# Patient Record
Sex: MALE | Race: WHITE | NOT HISPANIC OR LATINO | Employment: FULL TIME | URBAN - NONMETROPOLITAN AREA
[De-identification: names, ages, dates, MRNs, and addresses within clinical notes are randomized per-mention and may not be internally consistent; named-entity substitution may affect disease eponyms.]

---

## 2020-02-10 ENCOUNTER — HOSPITAL ENCOUNTER (EMERGENCY)
Facility: HOSPITAL | Age: 60
Discharge: HOME | End: 2020-02-10
Attending: EMERGENCY MEDICINE | Admitting: EMERGENCY MEDICINE
Payer: COMMERCIAL

## 2020-02-10 ENCOUNTER — APPOINTMENT (EMERGENCY)
Dept: RADIOLOGY | Facility: HOSPITAL | Age: 60
End: 2020-02-10
Payer: COMMERCIAL

## 2020-02-10 VITALS
BODY MASS INDEX: 28.14 KG/M2 | TEMPERATURE: 97.2 F | OXYGEN SATURATION: 95 % | HEART RATE: 82 BPM | HEIGHT: 69 IN | DIASTOLIC BLOOD PRESSURE: 95 MMHG | SYSTOLIC BLOOD PRESSURE: 152 MMHG | RESPIRATION RATE: 18 BRPM | WEIGHT: 190 LBS

## 2020-02-10 DIAGNOSIS — S43.101A AC SEPARATION, RIGHT, INITIAL ENCOUNTER: ICD-10-CM

## 2020-02-10 DIAGNOSIS — S49.91XA INJURY OF RIGHT SHOULDER, INITIAL ENCOUNTER: Primary | ICD-10-CM

## 2020-02-10 PROCEDURE — 73000 X-RAY EXAM OF COLLAR BONE: CPT

## 2020-02-10 PROCEDURE — 99284 EMERGENCY DEPT VISIT MOD MDM: CPT | Performed by: PHYSICIAN ASSISTANT

## 2020-02-10 PROCEDURE — 73030 X-RAY EXAM OF SHOULDER: CPT

## 2020-02-10 PROCEDURE — 99283 EMERGENCY DEPT VISIT LOW MDM: CPT

## 2020-02-10 RX ORDER — NAPROXEN 500 MG/1
500 TABLET ORAL 2 TIMES DAILY WITH MEALS
Qty: 30 TABLET | Refills: 0 | Status: SHIPPED | OUTPATIENT
Start: 2020-02-10

## 2020-02-10 RX ORDER — TRAMADOL HYDROCHLORIDE 50 MG/1
50 TABLET ORAL EVERY 8 HOURS PRN
Qty: 8 TABLET | Refills: 0 | Status: SHIPPED | OUTPATIENT
Start: 2020-02-10

## 2020-02-10 NOTE — ED PROVIDER NOTES
History  Chief Complaint   Patient presents with    Shoulder Injury     61year old male presents for evaluation of right shoulder injury  This occurred Saturday night  Pt notes he was walking through mud and tripped and fell  He started to fall backwards and leaned to the right side and put his arm out to catch his fall  Since that time has had pain in his right shoulder  Pain located in right shoulder and radiates along clavicle  He has been able to move his arm but reports limited ROM above shoulder level and increased pain with movement  Denies numbness, tingling, weakness  Denies hitting head  Denies LOC  Denies aspirin or blood thinners  Denies neck or back pain  No other injuries reported  Has been taking tylenol with mild relief  Reports prior left shoulder injury from a skiing accident but denies prior problems on the right  Pt right hand dominant  Denies pain in elbow, wrist or hands  PMH unremarkable  No meds reported  History provided by:  Patient and spouse   used: No    Shoulder Injury   Location:  Shoulder  Shoulder location:  R shoulder  Injury: yes    Time since incident:  2 days  Mechanism of injury: fall    Fall:     Fall occurred:  Walking    Impact surface: mud  Pain details:     Quality:  Aching    Radiates to:  R shoulder  Handedness:  Right-handed  Prior injury to area:  No  Worsened by: Movement  Ineffective treatments:  Acetaminophen  Associated symptoms: decreased range of motion and swelling    Associated symptoms: no back pain, no fatigue, no fever, no muscle weakness, no neck pain, no numbness, no stiffness and no tingling    Risk factors: no concern for non-accidental trauma, no known bone disorder, no frequent fractures and no recent illness        None       History reviewed  No pertinent past medical history  History reviewed  No pertinent surgical history  History reviewed  No pertinent family history    I have reviewed and agree with the history as documented  Social History     Tobacco Use    Smoking status: Never Smoker    Smokeless tobacco: Never Used   Substance Use Topics    Alcohol use: Not Currently     Frequency: Never    Drug use: Never        Review of Systems   Constitutional: Negative  Negative for chills, fatigue and fever  HENT: Negative  Negative for congestion, rhinorrhea and sore throat  Eyes: Negative  Negative for visual disturbance  Respiratory: Negative  Negative for cough, shortness of breath and wheezing  Cardiovascular: Negative  Negative for chest pain, palpitations and leg swelling  Gastrointestinal: Negative  Negative for abdominal pain, constipation, diarrhea, nausea and vomiting  Genitourinary: Negative  Negative for dysuria, flank pain, frequency and hematuria  Musculoskeletal: Positive for arthralgias  Negative for back pain, myalgias, neck pain and stiffness  Skin: Negative  Negative for rash and wound  Neurological: Negative  Negative for dizziness, syncope, light-headedness, numbness and headaches  Psychiatric/Behavioral: Negative  Negative for confusion  All other systems reviewed and are negative  Physical Exam  Physical Exam   Constitutional: He is oriented to person, place, and time  He appears well-developed and well-nourished  Non-toxic appearance  No distress  HENT:   Head: Normocephalic and atraumatic  Right Ear: Hearing, tympanic membrane, external ear and ear canal normal    Left Ear: Hearing, tympanic membrane, external ear and ear canal normal    Nose: Nose normal    Mouth/Throat: Uvula is midline, oropharynx is clear and moist and mucous membranes are normal  No oropharyngeal exudate  Eyes: Pupils are equal, round, and reactive to light  Conjunctivae and EOM are normal  No scleral icterus  Neck: Trachea normal and normal range of motion  Neck supple  No JVD present  No tracheal deviation present     Cardiovascular: Normal rate, regular rhythm, normal heart sounds, intact distal pulses and normal pulses  No murmur heard  Pulmonary/Chest: Effort normal and breath sounds normal  No respiratory distress  He has no wheezes  He has no rhonchi  He has no rales  Abdominal: Soft  Normal appearance and bowel sounds are normal  There is no hepatosplenomegaly  There is no tenderness  There is no rebound, no guarding and no CVA tenderness  No hernia  Musculoskeletal:        Right shoulder: He exhibits decreased range of motion (limits lifting above shoulder level), tenderness, swelling, deformity (there is elevation the AC joint) and pain  He exhibits no bony tenderness (no focal bony tenderness elcited), no effusion, no crepitus, no laceration, no spasm, normal pulse and normal strength  Right elbow: Normal He exhibits normal range of motion  No tenderness found  Right wrist: Normal  He exhibits normal range of motion and no bony tenderness  Cervical back: Normal  He exhibits normal range of motion and no bony tenderness  Thoracic back: Normal  He exhibits normal range of motion and no bony tenderness  Lumbar back: Normal  He exhibits normal range of motion and no bony tenderness  Neurological: He is alert and oriented to person, place, and time  He has normal strength and normal reflexes  No cranial nerve deficit or sensory deficit  He exhibits normal muscle tone  Coordination and gait normal  GCS eye subscore is 4  GCS verbal subscore is 5  GCS motor subscore is 6  Ambulates without assistance   Skin: Skin is warm and dry  Capillary refill takes less than 2 seconds  No rash noted  He is not diaphoretic  No cyanosis  Nails show no clubbing  Psychiatric: He has a normal mood and affect  His behavior is normal    Nursing note and vitals reviewed        Vital Signs  ED Triage Vitals [02/10/20 1005]   Temperature Pulse Respirations Blood Pressure SpO2   (!) 97 2 °F (36 2 °C) 82 18 (!) 169/101 98 %      Temp Source Heart Rate Source Patient Position - Orthostatic VS BP Location FiO2 (%)   Temporal Monitor Sitting Left arm --      Pain Score       9           Vitals:    02/10/20 1030 02/10/20 1046 02/10/20 1100 02/10/20 1204   BP: 148/95 148/95 152/95    Pulse:   81 82   Patient Position - Orthostatic VS: Sitting  Sitting Sitting         Visual Acuity      ED Medications  Medications - No data to display    Diagnostic Studies  Results Reviewed     None                 XR clavicle RIGHT   Final Result by Bethany Martin DO (02/10 1106)      Unremarkable right clavicle  Possible inferior subluxation of the acromion in relation to the distal clavicle  See separate shoulder x-ray report  Workstation performed: [id number]         XR shoulder 2+ views RIGHT   ED Interpretation by Bebo Barber PA-C (02/10 1038)   Abnormal   AC separation      Final Result by Bethany Martin DO (02/10 1105)      No acute osseous abnormality  There may be slight inferior subluxation of the acromion in relation to the clavicular head when compared to opposite left shoulder imaging from 2009  Consider AC joint imaging with weights  Workstation performed: [id number]                    Procedures  Procedures         ED Course  ED Course as of Feb 10 1403   Mon Feb 10, 2020   1038 XR shoulder 2+ views RIGHT(!)   1038 XR clavicle RIGHT   1124 Findings discussed with pt and spouse  Will place in sling and refer to orthopedics  Sling as directed  Referral to orthopedics  Reviewed RICE therapy  Rx NSAID as directed with food  Brief course of pain Rx provided  PDMP was queried and no suspicious behaviors noted  Ultram, qty#8 provided  Risks/benefits/side effects discussed  Sedation precautions reviewed  No driving while taking  Note for work given  Advised outpatient follow up with orthopedics or return to ER for change in condition as outlined   Pt and spouse verbalized understanding and had no further questions  MDM  Number of Diagnoses or Management Options  AC separation, right, initial encounter: new and requires workup  Injury of right shoulder, initial encounter: new and requires workup     Amount and/or Complexity of Data Reviewed  Tests in the radiology section of CPT®: ordered and reviewed  Decide to obtain previous medical records or to obtain history from someone other than the patient: yes  Obtain history from someone other than the patient: yes  Review and summarize past medical records: yes  Independent visualization of images, tracings, or specimens: yes    Patient Progress  Patient progress: improved        Disposition  Final diagnoses:   Injury of right shoulder, initial encounter   AC separation, right, initial encounter     Time reflects when diagnosis was documented in both MDM as applicable and the Disposition within this note     Time User Action Codes Description Comment    2/10/2020 11:24 AM Panda Roque Add [S49 91XA] Injury of right shoulder, initial encounter     2/10/2020 11:24 AM Panda Roque Add [S43 101A] AC separation, right, initial encounter       ED Disposition     ED Disposition Condition Date/Time Comment    Discharge Stable Mon Feb 10, 2020 11:24 AM Keo Kim  discharge to home/self care              Follow-up Information     Follow up With Specialties Details Why Contact Info Additional [phone] [facility] Schedule an appointment as soon as possible for a visit   [address] [facility] [address]  [address] [place] [address] Specialists Tawnya Hair [address] [facility], [facility] [place], [address]    [facility] Emergency Department Emergency Medicine  As needed [place]  [id number] [phone] [place] ED, [address], [address]      Schedule an appointment as soon as possible for a visit today             Discharge Medication List as of 2/10/2020 11:27 AM      START taking these medications    Details   naproxen (NAPROSYN) 500 mg tablet Take 1 tablet (500 mg total) by mouth 2 (two) times a day with meals, Starting Mon 2/10/2020, Normal      traMADol (ULTRAM) 50 mg tablet Take 1 tablet (50 mg total) by mouth every 8 (eight) hours as needed for severe pain, Starting Mon 2/10/2020, Normal           No discharge procedures on file      ED Provider  Electronically Signed by           Sridhar Becerril PA-C  02/10/20 5140

## 2020-02-10 NOTE — ED NOTES
Patient transported to [address] [place] [address], [address] [place] [address]  02/10/20 1010

## 2020-02-10 NOTE — CASE MANAGEMENT
I self referred the patient to discuss resources that might be available to him  He denied needing any help at this time  The patient lives with his wife in a one story house  There is no YELENA and one flight of 12 steps to the basement  He has no DME  He does not receive meals on wheels or home health services at this time  He is independent with his ADL's and he drives  He uses [address] in [place]  He has no trouble getting or paying for his medications  He has [employer] insurance  He did not call his PCP prior to coming to the ED today

## 2020-02-10 NOTE — DISCHARGE INSTRUCTIONS
Rest, ice, compression, elevation  Use sling as directed  Take anti-inflammatory as directed with food  Can supplement with OTC tylenol  Caution sedation with pain medication  No driving while taking  Schedule follow up with orthopedics for further evaluation and management

## 2020-02-12 VITALS
HEART RATE: 71 BPM | SYSTOLIC BLOOD PRESSURE: 144 MMHG | WEIGHT: 190 LBS | HEIGHT: 69 IN | RESPIRATION RATE: 16 BRPM | DIASTOLIC BLOOD PRESSURE: 83 MMHG | BODY MASS INDEX: 28.14 KG/M2

## 2020-02-12 DIAGNOSIS — S43.101A AC SEPARATION, RIGHT, INITIAL ENCOUNTER: Primary | ICD-10-CM

## 2020-02-12 PROCEDURE — 99203 OFFICE O/P NEW LOW 30 MIN: CPT | Performed by: ORTHOPAEDIC SURGERY

## 2020-02-12 NOTE — LETTER
February 12, 2020     Patient: Mariana Mart  YOB: 1960   Date of Visit: 2/12/2020       To Whom It May Concern: It is my medical opinion that Abby Mahajan should remain out of work until Monday 02/17/2020 at which time he may return but is to avoid any lifting more than 10 lb with the right shoulder and is not to do any pushing or pulling with the right arm  If you have any questions or concerns, please don't hesitate to call           Sincerely,        Michelle Vergara PA-C  CC: No Recipients

## 2020-02-12 NOTE — PATIENT INSTRUCTIONS
Reviewed x-ray findings and pathophysiology and treatment options with the patient at length  He will be in a sling for comfort only and when out in public for the next 2 weeks  He may remove the sling at home and work on gentle range of motion of the shoulder as pain permits  He may use anti-inflammatories and/or tramadol along with Tylenol and ice for 20 minutes on an as-needed basis  He was shown finger wall walks and and shoulder circles  We will re-evaluate the patient in 2 weeks and discuss possible physical therapy at that point time  Patient will return back to work on Monday 02/17/2019 and be out of work from injury date of 02/10 until that point time

## 2020-02-12 NOTE — PROGRESS NOTES
61 y o male presents for ER follow-up of right shoulder injury  Patient is right-hand dominant  Reports he was walking at home slipped and fell backward landing on the shoulder  He did not land on an outstretched hand  He noted pain about the top of the shoulder went emergency room  X-rays taken noting AC separation  He is placed in a sling is here for follow-up  Given prescription for Naprosyn and tramadol  He has been using those  He notes some decreasing amount of pain about the shoulder  He denies any numbness or tingling  Denies any other injury to the upper extremity head or neck region  Review of Systems  Review of systems negative unless otherwise specified in HPI    Past Medical History  Past Medical History:   Diagnosis Date    Known health problems: none      Past Surgical History  Past Surgical History:   Procedure Laterality Date    SHOULDER SURGERY       Current Medications  Current Outpatient Medications on File Prior to Visit   Medication Sig Dispense Refill    naproxen (NAPROSYN) 500 mg tablet Take 1 tablet (500 mg total) by mouth 2 (two) times a day with meals 30 tablet 0    traMADol (ULTRAM) 50 mg tablet Take 1 tablet (50 mg total) by mouth every 8 (eight) hours as needed for severe pain 8 tablet 0     No current facility-administered medications on file prior to visit  Recent Labs (HCT,HGB,PT,INR,ESR,CRP,GLU,HgA1C)  No results found for: HCT, HGB, WBC, PT, INR, ESR, CRP, GLUCOSE, HGBA1C    Physical exam  · General: Awake, Alert, Oriented  · Eyes: Pupils equal, round and reactive to light  · Heart: regular rate and rhythm  · Lungs: No audible wheezing  · Abdomen: soft  right Shoulder  · Obvious protuberance at the [facility] joint of the right shoulder which is not present on the left  There is pain with palpation at the [facility] joint  Clavicle is able to be mobilized the protuberance disappears  There is no neck pain to palpation    He has full range of motion of the elbow wrist and hand   He has full sensation and equal  strength  Forward flexion is approximately 90° abduction 90°  No pain at the [facility] joint no pain along the clavicle to palpation  Procedure  None    Imaging  I reviewed the x-rays which note AC separation and his back clinically by exam     1  AC separation, right, initial encounter      Assessment:  Isolated right shoulder AC separation without fracture  Plan:  Reviewed x-ray findings and pathophysiology and treatment options with the patient at length  He will be in a sling for comfort only and when out in public for the next 2 weeks  He may remove the sling at home and work on gentle range of motion of the shoulder as pain permits  He may use anti-inflammatories and/or tramadol along with Tylenol and ice for 20 minutes on an as-needed basis  He was shown finger wall walks and and shoulder circles  We will re-evaluate the patient in 2 weeks and discuss possible physical therapy at that point time  Patient will return back to work on Monday 02/17/2019 and be out of work from injury date of 02/10 until that point time

## 2020-02-26 ENCOUNTER — APPOINTMENT (OUTPATIENT)
Dept: RADIOLOGY | Facility: MEDICAL CENTER | Age: 60
End: 2020-02-26
Payer: COMMERCIAL

## 2020-02-26 VITALS
WEIGHT: 199.7 LBS | BODY MASS INDEX: 29.58 KG/M2 | DIASTOLIC BLOOD PRESSURE: 81 MMHG | SYSTOLIC BLOOD PRESSURE: 137 MMHG | HEIGHT: 69 IN | HEART RATE: 60 BPM

## 2020-02-26 DIAGNOSIS — S43.101D CLOSED DISLOCATION OF RIGHT ACROMIOCLAVICULAR JOINT, SUBSEQUENT ENCOUNTER: Primary | ICD-10-CM

## 2020-02-26 DIAGNOSIS — S43.101D CLOSED DISLOCATION OF RIGHT ACROMIOCLAVICULAR JOINT, SUBSEQUENT ENCOUNTER: ICD-10-CM

## 2020-02-26 PROCEDURE — 73030 X-RAY EXAM OF SHOULDER: CPT

## 2020-02-26 PROCEDURE — 99213 OFFICE O/P EST LOW 20 MIN: CPT | Performed by: ORTHOPAEDIC SURGERY

## 2020-02-26 PROCEDURE — 73050 X-RAY EXAM OF SHOULDERS: CPT

## 2020-02-26 NOTE — PROGRESS NOTES
Chief Complaint   right shoulder pain    History Of Presenting Illness  Rena Handley  1960 presents with  Right shoulder pain due to traumatic right AC joint dislocation  Patient comes in today for evaluation and x-rays  Pain is decreased significantly  Patient is right handed  and is able to work without any discomfort  His only complaint is swelling on top of the right shoulder      Current Medications  Current Outpatient Medications   Medication Sig Dispense Refill    naproxen (NAPROSYN) 500 mg tablet Take 1 tablet (500 mg total) by mouth 2 (two) times a day with meals 30 tablet 0    traMADol (ULTRAM) 50 mg tablet Take 1 tablet (50 mg total) by mouth every 8 (eight) hours as needed for severe pain (Patient not taking: Reported on 2/26/2020) 8 tablet 0     No current facility-administered medications for this visit  Current Problems    Active Problems: There are no active problems to display for this patient          Review of Systems:    General: negative for - chills, fatigue, fever,  weight gain or weight loss  Psychological: negative for - anxiety, behavioral disorder, concentration difficulties  Ophthalmic: negative for - blurry vision, decreased vision, double vision,      Past Medical History:   Past Medical History:   Diagnosis Date    Known health problems: none        Past Surgical History:   Past Surgical History:   Procedure Laterality Date    SHOULDER SURGERY         Family History:  Family history reviewed and non-contributory  Family History   Problem Relation Age of Onset    Breast cancer Mother     Heart disease Father     No Known Problems Sister     No Known Problems Brother        Social History:  Social History     Socioeconomic History    Marital status: /Civil Union     Spouse name: None    Number of children: None    Years of education: None    Highest education level: None   Occupational History    None   Social Needs    Financial resource strain: None    Food insecurity:     Worry: None     Inability: None    Transportation needs:     Medical: None     Non-medical: None   Tobacco Use    Smoking status: Never Smoker    Smokeless tobacco: Never Used   Substance and Sexual Activity    Alcohol use: Yes     Frequency: Never     Comment: social    Drug use: Never    Sexual activity: None   Lifestyle    Physical activity:     Days per week: None     Minutes per session: None    Stress: None   Relationships    Social connections:     Talks on phone: None     Gets together: None     Attends Yazidi service: None     Active member of club or organization: None     Attends meetings of clubs or organizations: None     Relationship status: None    Intimate partner violence:     Fear of current or ex partner: None     Emotionally abused: None     Physically abused: None     Forced sexual activity: None   Other Topics Concern    None   Social History Narrative    None       Allergies:   No Known Allergies        Physical ExaminationBP 137/81 (BP Location: Left arm, Patient Position: Sitting, Cuff Size: Large)   Pulse 60   Ht 5' 9" (1 753 m)   Wt 90 6 kg (199 lb 11 2 oz)   BMI 29 49 kg/m²   Gen: Alert and oriented to person, place, time  HEENT: EOMI, eyes clear, moist mucus membranes, hearing intact      Orthopedic Exam   right shoulder examination prominent right distal clavicle with the reducible subluxation   no tenderness over the [facility] joint excellent range of motion in the right shoulder without any deficits   radiographs  confirm complete dislocation of the right [facility] joint with superior migration          Impression   right AC joint dislocation          Plan     discussed treatment with the patient   options are to leave this alone and live with this with the persistent cosmetic deformity   the other option is excision of the distal clavicle and reconstruction of the coracoclavicular ligaments with allograft   patient does not want anything done at this moment but will see us back if he decides to get something done and changes his mind    Khadijah Hinojosa MD        Portions of the record may have been created with voice recognition software  Occasional wrong word or "sound a like" substitutions may have occurred due to the inherent limitations of voice recognition software  Read the chart carefully and recognize, using context, where substitutions have occurred

## 2022-09-22 ENCOUNTER — OFFICE VISIT (OUTPATIENT)
Dept: CARDIOLOGY CLINIC | Facility: CLINIC | Age: 62
End: 2022-09-22
Payer: COMMERCIAL

## 2022-09-22 VITALS
SYSTOLIC BLOOD PRESSURE: 140 MMHG | WEIGHT: 189 LBS | HEIGHT: 69 IN | DIASTOLIC BLOOD PRESSURE: 80 MMHG | HEART RATE: 72 BPM | BODY MASS INDEX: 27.99 KG/M2

## 2022-09-22 DIAGNOSIS — Z82.49 FH: CAD (CORONARY ARTERY DISEASE): ICD-10-CM

## 2022-09-22 DIAGNOSIS — Z82.49 FAMILY HISTORY OF EARLY CAD: Primary | ICD-10-CM

## 2022-09-22 DIAGNOSIS — R06.09 DYSPNEA ON EXERTION: ICD-10-CM

## 2022-09-22 PROCEDURE — 93000 ELECTROCARDIOGRAM COMPLETE: CPT | Performed by: INTERNAL MEDICINE

## 2022-09-22 PROCEDURE — 99204 OFFICE O/P NEW MOD 45 MIN: CPT | Performed by: INTERNAL MEDICINE

## 2022-09-22 NOTE — PATIENT INSTRUCTIONS
Cholesterol and Your Health   AMBULATORY CARE:   Cholesterol  is a waxy, fat-like substance  Your body uses cholesterol to make hormones and new cells, and to protect nerves  Cholesterol is made by your body  It also comes from certain foods you eat, such as meat and dairy products  Your healthcare provider can help you set goals for your cholesterol levels  He or she can help you create a plan to meet your goals  Cholesterol level goals: Your cholesterol level goals depend on your risk for heart disease, your age, and your other health conditions  The following are general guidelines: Total cholesterol  includes low-density lipoprotein (LDL), high-density lipoprotein (HDL), and triglyceride levels  The total cholesterol level should be lower than 200 mg/dL and is best at about 150 mg/dL  LDL cholesterol  is called bad cholesterol  because it forms plaque in your arteries  As plaque builds up, your arteries become narrow, and less blood flows through  When plaque decreases blood flow to your heart, you may have chest pain  If plaque completely blocks an artery that brings blood to your heart, you may have a heart attack  Plaque can break off and form blood clots  Blood clots may block arteries in your brain and cause a stroke  The level should be less than 130 mg/dL and is best at about 100 mg/dL  HDL cholesterol  is called good cholesterol  because it helps remove LDL cholesterol from your arteries  It does this by attaching to LDL cholesterol and carrying it to your liver  Your liver breaks down LDL cholesterol so your body can get rid of it  High levels of HDL cholesterol can help prevent a heart attack and stroke  Low levels of HDL cholesterol can increase your risk for heart disease, heart attack, and stroke  The level should be 60 mg/dL or higher  Triglycerides  are a type of fat that store energy from foods you eat  High levels of triglycerides also cause plaque buildup   This can increase your risk for a heart attack or stroke  If your triglyceride level is high, your LDL cholesterol level may also be high  The level should be less than 150 mg/dL  Any of the following can increase your risk for high cholesterol:   Smoking cigarettes    Being overweight or obese, or not getting enough exercise    Drinking large amounts of alcohol    A medical condition such as hypertension (high blood pressure) or diabetes    Certain genes passed from your parents to you    Age older than 65 years    What you need to know about having your cholesterol levels checked: Adults 21to 39years of age should have their cholesterol levels checked every 4 to 6 years  Adults 45 years or older should have their cholesterol checked every 1 to 2 years  You may need your cholesterol checked more often, or at a younger age, if you have risk factors for heart disease  You may also need to have your cholesterol checked more often if you have other health conditions, such as diabetes  Blood tests are used to check cholesterol levels  Blood tests measure your levels of triglycerides, LDL cholesterol, and HDL cholesterol  How healthy fats affect your cholesterol levels:  Healthy fats, also called unsaturated fats, help lower LDL cholesterol and triglyceride levels  Healthy fats include the following:  Monounsaturated fats  are found in foods such as olive oil, canola oil, avocado, nuts, and olives  Polyunsaturated fats,  such as omega 3 fats, are found in fish, such as salmon, trout, and tuna  They can also be found in plant foods such as flaxseed, walnuts, and soybeans  How unhealthy fats affect your cholesterol levels:  Unhealthy fats increase LDL cholesterol and triglyceride levels  They are found in foods high in cholesterol, saturated fat, and trans fat:  Cholesterol  is found in eggs, dairy, and meat  Saturated fat  is found in butter, cheese, ice cream, whole milk, and coconut oil   Saturated fat is also found in meat, such as sausage, hot dogs, and bologna  Trans fat  is found in liquid oils and is used in fried and baked foods  Foods that contain trans fats include chips, crackers, muffins, sweet rolls, microwave popcorn, and cookies  Treatment  for high cholesterol will also decrease your risk of heart disease, heart attack, and stroke  Treatment may include any of the following:  Lifestyle changes  may include food, exercise, weight loss, and quitting smoking  You may also need to decrease the amount of alcohol you drink  Your healthcare provider will want you to start with lifestyle changes  Other treatment may be added if lifestyle changes are not enough  Your healthcare provider may recommend you work with a team to manage hyperlipidemia  The team may include medical experts such as a dietitian, an exercise or physical therapist, and a behavior therapist  Your family members may be included in helping you create lifestyle changes  Medicines  may be given to lower your LDL cholesterol, triglyceride levels, or total cholesterol level  You may need medicines to lower your cholesterol if any of the following is true:    You have a history of stroke, TIA, unstable angina, or a heart attack  Your LDL cholesterol level is 190 mg/dL or higher  You are age 36 to 76 years, have diabetes or heart disease risk factors, and your LDL cholesterol is 70 mg/dL or higher  Supplements  include fish oil, red yeast rice, and garlic  Fish oil may help lower your triglyceride and LDL cholesterol levels  It may also increase your HDL cholesterol level  Red yeast rice may help decrease your total cholesterol level and LDL cholesterol level  Garlic may help lower your total cholesterol level  Do not take any supplements without talking to your healthcare provider  Food changes you can make to lower your cholesterol levels:  A dietitian can help you create a healthy eating plan   He or she can show you how to read food labels and choose foods low in saturated fat, trans fats, and cholesterol  Decrease the total amount of fat you eat  Choose lean meats, fat-free or 1% fat milk, and low-fat dairy products, such as yogurt and cheese  Try to limit or avoid red meats  Limit or do not eat fried foods or baked goods, such as cookies  Replace unhealthy fats with healthy fats  Cook foods in olive oil or canola oil  Choose soft margarines that are low in saturated fat and trans fat  Seeds, nuts, and avocados are other examples of healthy fats  Eat foods with omega-3 fats  Examples include salmon, tuna, mackerel, walnuts, and flaxseed  Eat fish 2 times per week  Pregnant women should not eat fish that have high levels of mercury, such as shark, swordfish, and yessenia mackerel  Increase the amount of high-fiber foods you eat  High-fiber foods can help lower your LDL cholesterol  Aim to get between 20 and 30 grams of fiber each day  Fruits and vegetables are high in fiber  Eat at least 5 servings each day  Other high-fiber foods are whole-grain or whole-wheat breads, pastas, or cereals, and brown rice  Eat 3 ounces of whole-grain foods each day  Increase fiber slowly  You may have abdominal discomfort, bloating, and gas if you add fiber to your diet too quickly  Eat healthy protein foods  Examples include low-fat dairy products, skinless chicken and turkey, fish, and nuts  Limit foods and drinks that are high in sugar  Your dietitian or healthcare provider can help you create daily limits for high-sugar foods and drinks  The limit may be lower if you have diabetes or another health condition  Limits can also help you lose weight if needed  Lifestyle changes you can make to lower your cholesterol levels:   Maintain a healthy weight  Ask your healthcare provider what a healthy weight is for you  Ask him or her to help you create a weight loss plan if needed   Weight loss can decrease your total cholesterol and triglyceride levels  Weight loss may also help keep your blood pressure at a healthy level  Be physically active throughout the day  Physical activity, such as exercise, can help lower your total cholesterol level and maintain a healthy weight  Physical activity can also help increase your HDL cholesterol level  Work with your healthcare provider to create an program that is right for you  Get at least 30 to 40 minutes of moderate physical activity most days of the week  Examples of exercise include brisk walking, swimming, or biking  Also include strength training at least 2 times each week  Your healthcare providers can help you create a physical activity plan  Do not smoke  Nicotine and other chemicals in cigarettes and cigars can raise your cholesterol levels  Ask your healthcare provider for information if you currently smoke and need help to quit  E-cigarettes or smokeless tobacco still contain nicotine  Talk to your healthcare provider before you use these products  Limit or do not drink alcohol  Alcohol can increase your triglyceride levels  Ask your healthcare provider before you drink alcohol  Ask how much is okay for you to drink in 24 hours or 1 week  Follow up with your doctor as directed:  Write down your questions so you remember to ask them during your visits  © Copyright [employer] 2022 Information is for End User's use only and may not be sold, redistributed or otherwise used for commercial purposes  All illustrations and images included in CareNotes® are the copyrighted property of [employer]  or [facility] [place] [facility]  The above information is an  only  It is not intended as medical advice for individual conditions or treatments  Talk to your doctor, nurse or pharmacist before following any medical regimen to see if it is safe and effective for you

## 2022-09-22 NOTE — PROGRESS NOTES
Subjective:        Patient ID: Adeel Naranjo  is a 58 y o  male  Chief Complaint:  Carmina Mar is here for cardiac opinion on his own accord, he no longer has a PCP, used to see 1 before the pandemic but has long been lost to follow-up he says  Denies any history rheumatic fever, heart disease, heart murmur  Family history of CAD and dysrhythmia which is why he wanted to get checked out  Has not been exercising lately but stays active, rarely achieving over 4 Mets lately but he used to, used to be a runner with no issues for many years  Denies any chest pains, no more than anticipated dyspnea on exertion when he goes up really steep hills or something like that, nothing he says he would not expect, no orthopnea PND, no palpitations presyncope or syncope, no edema orthopnea nor TIA symptoms  No claudication symptoms  He does not use any illicit substances, denies any excess stimulant use, does not excessively use alcohol, does not smoke or use any tobacco products  The following portions of the patient's history were reviewed and updated as appropriate: allergies, current medications, past family history, past medical history, past social history, past surgical history and problem list   Review of Systems   Constitutional: Negative for chills, diaphoresis, malaise/fatigue and weight gain  HENT: Negative for nosebleeds and stridor  Eyes: Negative for double vision, vision loss in left eye, vision loss in right eye and visual disturbance  Cardiovascular: Negative for chest pain, claudication, cyanosis, dyspnea on exertion, irregular heartbeat, leg swelling, near-syncope, orthopnea, palpitations, paroxysmal nocturnal dyspnea and syncope  Respiratory: Negative for cough, shortness of breath, snoring and wheezing  Endocrine: Negative for polydipsia, polyphagia and polyuria  Hematologic/Lymphatic: Negative for bleeding problem  Does not bruise/bleed easily     Skin: Negative for flushing and rash  Musculoskeletal: Negative for falls and myalgias  Gastrointestinal: Negative for abdominal pain, heartburn, hematemesis, hematochezia, melena and nausea  Genitourinary: Negative for hematuria  Neurological: Negative for brief paralysis, dizziness, focal weakness, headaches, light-headedness, loss of balance and vertigo  Psychiatric/Behavioral: Negative for altered mental status and substance abuse  Allergic/Immunologic: Negative for hives  Objective:      /80   Pulse 72   Ht 5' 9" (1 753 m)   Wt 85 7 kg (189 lb)   BMI 27 91 kg/m²   Physical Exam  Constitutional:       General: He is not in acute distress  Appearance: He is well-developed  He is not diaphoretic  HENT:      Head: Normocephalic and atraumatic  Eyes:      General: No scleral icterus  Pupils: Pupils are equal, round, and reactive to light  Neck:      Thyroid: No thyromegaly  Vascular: No carotid bruit or JVD  Cardiovascular:      Rate and Rhythm: Normal rate and regular rhythm  Heart sounds: Murmur (Grade 1 systolic outflow murmur with normal S1-S2) heard  No friction rub  No gallop  Pulmonary:      Effort: Pulmonary effort is normal  No respiratory distress  Breath sounds: Normal breath sounds  No stridor  No wheezing or rales  Abdominal:      General: Bowel sounds are normal  There is no distension  Palpations: Abdomen is soft  There is no mass  Tenderness: There is no abdominal tenderness  Musculoskeletal:         General: No deformity  Normal range of motion  Cervical back: Normal range of motion and neck supple  Right lower leg: No edema  Left lower leg: No edema  Skin:     General: Skin is warm and dry  Coloration: Skin is not pale  Findings: No erythema  Neurological:      Mental Status: He is alert and oriented to person, place, and time        Coordination: Coordination normal    Psychiatric:         Mood and Affect: Mood normal          Behavior: Behavior normal          Thought Content: Thought content normal          Judgment: Judgment normal          Lab Review:   No visits with results within 6 Month(s) from this visit  Latest known visit with results is:   No results found for any previous visit  No results found  Assessment:       1  Family history of early CAD  POCT ECG    Basic metabolic panel    Lipid panel    Echo stress test, exercise   2  FH: CAD (coronary artery disease)  Basic metabolic panel    Lipid panel    Echo stress test, exercise   3  Dyspnea on exertion          Plan:       His murmur sounds quite benign, likely a flow murmur, urgent testing not required here however in light of some vague dyspnea, strong family history of CAD, will order stress testing to rule out coronary insufficiency at his age, primarily because he wants to get back into an aerobic exercise program in the near future, so I would like to clear him for such  He is also due for some general blood work which I have ordered today fasting  I told him if the stress test is okay, and his blood work reveals nothing alarming I would be glad to see him once a year  He agreed to such  I strongly recommended he acquire a primary care physician, informed him that there are many general medical issues I do not screening for  He understands  Asked him to call us prior to his 1 year follow-up visit if he develops any concerning potential cardiac symptoms in the interim

## 2022-11-03 ENCOUNTER — APPOINTMENT (OUTPATIENT)
Dept: LAB | Facility: MEDICAL CENTER | Age: 62
End: 2022-11-03

## 2022-11-03 LAB
ANION GAP SERPL CALCULATED.3IONS-SCNC: 5 MMOL/L (ref 4–13)
BASOPHILS # BLD AUTO: 0.01 THOUSANDS/ÂΜL (ref 0–0.1)
BASOPHILS NFR BLD AUTO: 0 % (ref 0–1)
BUN SERPL-MCNC: 18 MG/DL (ref 5–25)
CALCIUM SERPL-MCNC: 8.8 MG/DL (ref 8.3–10.1)
CHLORIDE SERPL-SCNC: 108 MMOL/L (ref 96–108)
CHOLEST SERPL-MCNC: 180 MG/DL
CO2 SERPL-SCNC: 27 MMOL/L (ref 21–32)
CREAT SERPL-MCNC: 0.85 MG/DL (ref 0.6–1.3)
EOSINOPHIL # BLD AUTO: 0.08 THOUSAND/ÂΜL (ref 0–0.61)
EOSINOPHIL NFR BLD AUTO: 2 % (ref 0–6)
ERYTHROCYTE [DISTWIDTH] IN BLOOD BY AUTOMATED COUNT: 13 % (ref 11.6–15.1)
GFR SERPL CREATININE-BSD FRML MDRD: 93 ML/MIN/1.73SQ M
GLUCOSE P FAST SERPL-MCNC: 112 MG/DL (ref 65–99)
HCT VFR BLD AUTO: 43.6 % (ref 36.5–49.3)
HDLC SERPL-MCNC: 39 MG/DL
HGB BLD-MCNC: 14.7 G/DL (ref 12–17)
IMM GRANULOCYTES # BLD AUTO: 0.01 THOUSAND/UL (ref 0–0.2)
IMM GRANULOCYTES NFR BLD AUTO: 0 % (ref 0–2)
LDLC SERPL CALC-MCNC: 93 MG/DL (ref 0–100)
LYMPHOCYTES # BLD AUTO: 0.77 THOUSANDS/ÂΜL (ref 0.6–4.47)
LYMPHOCYTES NFR BLD AUTO: 23 % (ref 14–44)
MCH RBC QN AUTO: 32.7 PG (ref 26.8–34.3)
MCHC RBC AUTO-ENTMCNC: 33.7 G/DL (ref 31.4–37.4)
MCV RBC AUTO: 97 FL (ref 82–98)
MONOCYTES # BLD AUTO: 0.37 THOUSAND/ÂΜL (ref 0.17–1.22)
MONOCYTES NFR BLD AUTO: 11 % (ref 4–12)
NEUTROPHILS # BLD AUTO: 2.07 THOUSANDS/ÂΜL (ref 1.85–7.62)
NEUTS SEG NFR BLD AUTO: 64 % (ref 43–75)
NONHDLC SERPL-MCNC: 141 MG/DL
NRBC BLD AUTO-RTO: 0 /100 WBCS
PLATELET # BLD AUTO: 149 THOUSANDS/UL (ref 149–390)
PMV BLD AUTO: 12.1 FL (ref 8.9–12.7)
POTASSIUM SERPL-SCNC: 3.9 MMOL/L (ref 3.5–5.3)
RBC # BLD AUTO: 4.49 MILLION/UL (ref 3.88–5.62)
SODIUM SERPL-SCNC: 140 MMOL/L (ref 135–147)
TRIGL SERPL-MCNC: 240 MG/DL
WBC # BLD AUTO: 3.31 THOUSAND/UL (ref 4.31–10.16)

## 2022-11-14 ENCOUNTER — HOSPITAL ENCOUNTER (OUTPATIENT)
Dept: NON INVASIVE DIAGNOSTICS | Facility: HOSPITAL | Age: 62
Discharge: HOME | End: 2022-11-14
Attending: INTERNAL MEDICINE

## 2022-11-14 VITALS
DIASTOLIC BLOOD PRESSURE: 90 MMHG | SYSTOLIC BLOOD PRESSURE: 142 MMHG | HEIGHT: 69 IN | HEART RATE: 67 BPM | WEIGHT: 189 LBS | BODY MASS INDEX: 27.99 KG/M2

## 2022-11-14 DIAGNOSIS — Z82.49 FAMILY HISTORY OF EARLY CAD: ICD-10-CM

## 2022-11-14 DIAGNOSIS — Z82.49 FH: CAD (CORONARY ARTERY DISEASE): ICD-10-CM

## 2022-11-14 LAB
BASELINE ST DEPRESSION: 0 MM
E WAVE DECELERATION TIME: 143 MS
E/A RATIO: 1.16
MAX HR PERCENT: 108 %
MAX HR: 171 BPM
MV PEAK A VEL: 0.76 M/S
MV PEAK E VEL: 88 CM/S
MV STENOSIS PRESSURE HALF TIME: 42 MS
MV VALVE AREA P 1/2 METHOD: 5.2
RATE PRESSURE PRODUCT: NORMAL
SL CV LV EF: 55
SL CV STRESS RECOVERY BP: NORMAL MMHG
SL CV STRESS RECOVERY HR: 85 BPM
SL CV STRESS RECOVERY O2 SAT: 99 %
SL CV STRESS STAGE REACHED: 4
STRESS ANGINA INDEX: 0
STRESS BASELINE BP: NORMAL MMHG
STRESS BASELINE HR: 67 BPM
STRESS DUKE TREADMILL SCORE: 10
STRESS O2 SAT REST: 99 %
STRESS PEAK HR: 171 BPM
STRESS POST ESTIMATED WORKLOAD: 11.2 METS
STRESS POST EXERCISE DUR MIN: 9 MIN
STRESS POST EXERCISE DUR SEC: 43 SEC
STRESS POST O2 SAT PEAK: 99 %
STRESS POST PEAK BP: 200 MMHG
STRESS ST DEPRESSION: 0 MM
TRICUSPID VALVE PEAK REGURGITATION VELOCITY: 2.59 M/S

## 2022-11-17 ENCOUNTER — TELEPHONE (OUTPATIENT)
Dept: FAMILY MEDICINE CLINIC | Facility: CLINIC | Age: 62
End: 2022-11-17

## 2022-11-17 LAB
CHEST PAIN STATEMENT: NORMAL
MAX DIASTOLIC BP: 98 MMHG
MAX HEART RATE: 171 BPM
MAX PREDICTED HEART RATE: 158 BPM
MAX. SYSTOLIC BP: 200 MMHG
PROTOCOL NAME: NORMAL
REASON FOR TERMINATION: NORMAL
TARGET HR FORMULA: NORMAL
TEST INDICATION: NORMAL
TIME IN EXERCISE PHASE: NORMAL

## 2022-11-17 NOTE — TELEPHONE ENCOUNTER
I called patient back to schedule a new patient appt- He stated he wanted to get set up with a new PCP

## 2022-11-22 RX ORDER — FENOFIBRATE 40 MG/1
40 TABLET ORAL DAILY
Qty: 30 TABLET | Refills: 7 | Status: CANCELLED | OUTPATIENT
Start: 2022-11-22 | End: 2022-12-22

## 2022-11-23 ENCOUNTER — OFFICE VISIT (OUTPATIENT)
Dept: FAMILY MEDICINE CLINIC | Facility: CLINIC | Age: 62
End: 2022-11-23

## 2022-11-23 VITALS
BODY MASS INDEX: 29.12 KG/M2 | HEIGHT: 69 IN | WEIGHT: 196.6 LBS | DIASTOLIC BLOOD PRESSURE: 74 MMHG | SYSTOLIC BLOOD PRESSURE: 130 MMHG | RESPIRATION RATE: 18 BRPM | TEMPERATURE: 97.8 F | HEART RATE: 88 BPM | OXYGEN SATURATION: 96 %

## 2022-11-23 DIAGNOSIS — Z23 NEED FOR IMMUNIZATION AGAINST INFLUENZA: ICD-10-CM

## 2022-11-23 DIAGNOSIS — Z11.59 NEED FOR HEPATITIS C SCREENING TEST: ICD-10-CM

## 2022-11-23 DIAGNOSIS — Z76.89 ENCOUNTER TO ESTABLISH CARE: Primary | ICD-10-CM

## 2022-11-23 DIAGNOSIS — Z23 NEED FOR DIPHTHERIA-TETANUS-PERTUSSIS (TDAP) VACCINE: ICD-10-CM

## 2022-11-23 DIAGNOSIS — Z11.4 SCREENING FOR HIV (HUMAN IMMUNODEFICIENCY VIRUS): ICD-10-CM

## 2022-11-23 DIAGNOSIS — Z12.11 SCREEN FOR COLON CANCER: ICD-10-CM

## 2022-11-23 DIAGNOSIS — E78.1 HYPERTRIGLYCERIDEMIA: ICD-10-CM

## 2022-11-23 RX ORDER — ROSUVASTATIN CALCIUM 5 MG/1
5 TABLET, COATED ORAL DAILY
Qty: 30 TABLET | Refills: 5 | Status: SHIPPED | OUTPATIENT
Start: 2022-11-23 | End: 2022-11-23 | Stop reason: CLARIF

## 2022-11-23 NOTE — PROGRESS NOTES
Assessment/Plan:    No problem-specific Assessment & Plan notes found for this encounter  Diagnoses and all orders for this visit:    Encounter to establish care  -     TSH, 3rd generation with Free T4 reflex; Future  -     Hemoglobin A1C; Future  -     Discontinue: rosuvastatin (CRESTOR) 5 mg tablet; Take 1 tablet (5 mg total) by mouth daily  -     Comprehensive metabolic panel; Future  -     Vitamin D 25 hydroxy; Future    Need for diphtheria-tetanus-pertussis (Tdap) vaccine  -     Tdap vaccine greater than or equal to 6yo IM    Screening for HIV (human immunodeficiency virus)  -     HIV 1/2 Antigen/Antibody (4th Generation) w Reflex [facility]; Future    Need for hepatitis C screening test  -     Hepatitis C antibody; Future    Screen for colon cancer  -     Ambulatory referral for colonoscopy; Future    Need for immunization against influenza  -     influenza vaccine, quadrivalent, recombinant, PF, 0 5 mL, for patients 18 yr+ (FLUBLOK)    Hypertriglyceridemia  Comments:  rec dash diet and exercise,he would like to improve his diet and then FUP in 3 mnths  pt doens not want to start meds now  Orders:  -     Discontinue: rosuvastatin (CRESTOR) 5 mg tablet; Take 1 tablet (5 mg total) by mouth daily  -     Discontinue: co-enzyme Q-10 30 MG capsule; Take 1 capsule (30 mg total) by mouth daily  -     Lipid Panel with Direct LDL reflex; Future    BMI 29 0-29 9,adult           Patient is a 58 y o w/o significant PMH here today to establish care  no smoking hx,  Uses alcohol occasionally  No recreational drug use  Not taking any medication currently, he is active, follows  healthy diet  No allergies no medication use  Patient has family history of MI,his brother passed away last year due to MI  His father and paternal uncle passed away from MI as well  His  mom has a breast cancer    Follow-up  Late January to reviewed the labs and do annual physical     Patient seen by cardiology, Stress echo was done, ECG done - wnl    Pt denies CP, syncope, near syncope, HA, n/v, BM changes, SOB  Subjective:      Patient ID: Ebonie Parrish  is a 58 y o  male  HPI      Patient  is a 58 y o w/o significant PMH here today to establish care  no smoking hx,  Uses alcohol occasionally  No recreational drug use  Not taking any medication currently, he is active, follows  healthy diet  No allergies no medication use  Patient has family history of MI,his brother passed away last year due to MI  His father and paternal uncle passed away from MI as well  His  mom has a breast cancer  Follow-up  Late January to reviewed the labs and do annual physical     Patient seen by cardiology, Stress echo was done, ECG done - wnl  The following portions of the patient's history were reviewed and updated as appropriate: allergies, current medications, past family history, past medical history, past social history, past surgical history and problem list     Review of Systems   Constitutional: Negative for chills and fever  HENT: Negative for ear pain and sore throat  Eyes: Negative for pain and visual disturbance  Respiratory: Negative for cough and shortness of breath  Cardiovascular: Negative for chest pain and palpitations  Gastrointestinal: Negative for abdominal pain and vomiting  Genitourinary: Negative for dysuria and hematuria  Musculoskeletal: Negative for arthralgias and back pain  Skin: Negative for color change and rash  Neurological: Negative for seizures and syncope  All other systems reviewed and are negative  Objective:      /74   Pulse 88   Temp 97 8 °F (36 6 °C)   Resp 18   Ht 5' 9" (1 753 m)   Wt 89 2 kg (196 lb 9 6 oz)   SpO2 96%   BMI 29 03 kg/m²          Physical Exam  Vitals reviewed  Constitutional:       General: He is not in acute distress  Appearance: He is obese  He is not ill-appearing  HENT:      Head: Normocephalic and atraumatic        Right Ear: Tympanic membrane normal       Left Ear: Tympanic membrane normal       Nose: Nose normal       Mouth/Throat:      Mouth: Mucous membranes are moist    Eyes:      Conjunctiva/sclera: Conjunctivae normal       Pupils: Pupils are equal, round, and reactive to light  Cardiovascular:      Rate and Rhythm: Normal rate and regular rhythm  Pulses: Normal pulses  Heart sounds: Normal heart sounds  Pulmonary:      Effort: Pulmonary effort is normal       Breath sounds: Normal breath sounds  Abdominal:      General: Abdomen is flat  Bowel sounds are normal  There is no distension  Palpations: Abdomen is soft  Musculoskeletal:      Cervical back: Normal range of motion  Right lower leg: No edema  Left lower leg: No edema  Skin:     General: Skin is warm  Capillary Refill: Capillary refill takes less than 2 seconds  Neurological:      Mental Status: He is alert     Psychiatric:         Mood and Affect: Mood normal

## 2023-02-20 ENCOUNTER — OFFICE VISIT (OUTPATIENT)
Dept: FAMILY MEDICINE CLINIC | Facility: CLINIC | Age: 63
End: 2023-02-20

## 2023-02-20 VITALS
DIASTOLIC BLOOD PRESSURE: 64 MMHG | WEIGHT: 191 LBS | OXYGEN SATURATION: 97 % | BODY MASS INDEX: 28.29 KG/M2 | HEART RATE: 78 BPM | TEMPERATURE: 97.8 F | SYSTOLIC BLOOD PRESSURE: 122 MMHG | HEIGHT: 69 IN

## 2023-02-20 DIAGNOSIS — L98.9 FACIAL LESION: ICD-10-CM

## 2023-02-20 DIAGNOSIS — Z00.00 ANNUAL PHYSICAL EXAM: Primary | ICD-10-CM

## 2023-02-20 DIAGNOSIS — Z12.11 COLON CANCER SCREENING: ICD-10-CM

## 2023-02-20 PROBLEM — M25.519 SHOULDER JOINT PAIN: Status: ACTIVE | Noted: 2023-02-20

## 2023-02-20 PROBLEM — S46.819A STRAIN OF SUPRASPINATUS MUSCLE OR TENDON: Status: ACTIVE | Noted: 2023-02-20

## 2023-02-20 NOTE — PROGRESS NOTES
[address]    NAME: Marialuisa Torres  AGE: 58 y o  SEX: male  : 1960     DATE: 2023     Assessment and Plan:     Problem List Items Addressed This Visit    None  Visit Diagnoses     Annual physical exam    -  Primary    Facial lesion        Dermatology referral per pt request    Relevant Orders    Ambulatory Referral to Dermatology    Colon cancer screening        Relevant Orders    Cologuard          Immunizations and preventive care screenings were discussed with patient today  Appropriate education was printed on patient's after visit summary  Counseling:  Alcohol/drug use: discussed moderation in alcohol intake, the recommendations for healthy alcohol use, and avoidance of illicit drug use  Dental Health: discussed importance of regular tooth brushing, flossing, and dental visits  Injury prevention: discussed safety/seat belts, safety helmets, smoke detectors, carbon dioxide detectors, and smoking near bedding or upholstery  Sexual health: discussed sexually transmitted diseases, partner selection, use of condoms, avoidance of unintended pregnancy, and contraceptive alternatives  · Exercise: the importance of regular exercise/physical activity was discussed  Recommend exercise 3-5 times per week for at least 30 minutes  BMI Counseling: Body mass index is 28 21 kg/m²  The BMI is above normal  Nutrition recommendations include decreasing portion sizes, encouraging healthy choices of fruits and vegetables, decreasing fast food intake, consuming healthier snacks, limiting drinks that contain sugar, moderation in carbohydrate intake, increasing intake of lean protein, reducing intake of saturated and trans fat and reducing intake of cholesterol  Exercise recommendations include exercising 3-5 times per week  No pharmacotherapy was ordered   Rationale for BMI follow-up plan is due to patient being overweight or obese  Return in about 1 year (around 2/20/2024) for Annual physical      Chief Complaint:     Chief Complaint   Patient presents with   • Annual Exam      History of Present Illness:     Adult Annual Physical   Patient here for a comprehensive physical exam  The patient reports no problems  Diet and Physical Activity  · Diet/Nutrition: well balanced diet, limited junk food and consuming 3-5 servings of fruits/vegetables daily  · Exercise: walking, 5-7 times a week on average and 30-60 minutes on average  Depression Screening  PHQ-2/9 Depression Screening         General Health  · Sleep: sleeps well and gets 7-8 hours of sleep on average  · Hearing: normal - bilateral   · Vision: no vision problems, most recent eye exam <1 year ago and wears glasses  · Dental: regular dental visits, brushes teeth twice daily and flosses teeth occasionally   Health  · Symptoms include: none     Review of Systems:     Review of Systems   Constitutional: Negative for activity change, appetite change, fatigue and unexpected weight change  HENT: Negative for congestion, ear pain, hearing loss, nosebleeds, rhinorrhea, sinus pain and trouble swallowing  Eyes: Negative for photophobia  Respiratory: Negative for choking, shortness of breath and wheezing  Cardiovascular: Negative for chest pain, palpitations and leg swelling  Gastrointestinal: Negative for abdominal pain, blood in stool, constipation, diarrhea and nausea  Endocrine: Negative for polydipsia, polyphagia and polyuria  Genitourinary: Negative for difficulty urinating, dysuria, frequency, hematuria and urgency  Musculoskeletal: Negative for arthralgias, back pain and myalgias  Skin: Negative for color change, rash and wound  Neurological: Negative for dizziness, tremors, syncope, weakness and headaches  Psychiatric/Behavioral: Negative for agitation, confusion, self-injury and suicidal ideas        Past Medical History:     Past Medical History:   Diagnosis Date   • Known health problems: none       Past Surgical History:     Past Surgical History:   Procedure Laterality Date   • ROTATOR CUFF REPAIR Right    • SHOULDER SURGERY        Family History:     Family History   Problem Relation Age of Onset   • Breast cancer Mother    • Heart disease Father    • No Known Problems Sister    • No Known Problems Brother       Social History:     Social History     Socioeconomic History   • Marital status: /Civil Union     Spouse name: None   • Number of children: None   • Years of education: None   • Highest education level: None   Occupational History   • None   Tobacco Use   • Smoking status: Never   • Smokeless tobacco: Never   Vaping Use   • Vaping Use: None   Substance and Sexual Activity   • Alcohol use: Yes     Comment: social   • Drug use: Never   • Sexual activity: Never     Partners: Female, Male   Other Topics Concern   • None   Social History Narrative   • None     Social Determinants of Health     Financial Resource Strain: Not on file   Food Insecurity: Not on file   Transportation Needs: Not on file   Physical Activity: Not on file   Stress: Not on file   Social Connections: Not on file   Intimate Partner Violence: Not on file   Housing Stability: Not on file      Current Medications:     No current outpatient medications on file  No current facility-administered medications for this visit  Allergies:     No Known Allergies   Physical Exam:     /64   Pulse 78   Temp 97 8 °F (36 6 °C)   Ht 5' 9" (1 753 m)   Wt 86 6 kg (191 lb)   SpO2 97%   BMI 28 21 kg/m²     Physical Exam  Vitals and nursing note reviewed  Constitutional:       General: He is not in acute distress  Appearance: He is well-developed  HENT:      Head: Normocephalic and atraumatic        Right Ear: Tympanic membrane, ear canal and external ear normal       Left Ear: Tympanic membrane, ear canal and external ear normal  Nose: Nose normal       Mouth/Throat:      Mouth: Mucous membranes are moist       Pharynx: Oropharynx is clear  Eyes:      Conjunctiva/sclera: Conjunctivae normal    Cardiovascular:      Rate and Rhythm: Normal rate and regular rhythm  Heart sounds: No murmur heard  Pulmonary:      Effort: Pulmonary effort is normal  No respiratory distress  Breath sounds: Normal breath sounds  Abdominal:      Palpations: Abdomen is soft  Tenderness: There is no abdominal tenderness  Genitourinary:     Comments: Exam deferred  Musculoskeletal:         General: No swelling  Skin:     General: Skin is warm and dry  Capillary Refill: Capillary refill takes less than 2 seconds  Neurological:      Mental Status: He is alert     Psychiatric:         Mood and Affect: Mood normal           Yannick Edgar 29

## 2023-03-15 ENCOUNTER — APPOINTMENT (EMERGENCY)
Dept: RADIOLOGY | Facility: HOSPITAL | Age: 63
End: 2023-03-15

## 2023-03-15 ENCOUNTER — APPOINTMENT (INPATIENT)
Dept: RADIOLOGY | Facility: HOSPITAL | Age: 63
End: 2023-03-15

## 2023-03-15 ENCOUNTER — APPOINTMENT (EMERGENCY)
Dept: CT IMAGING | Facility: HOSPITAL | Age: 63
End: 2023-03-15

## 2023-03-15 ENCOUNTER — HOSPITAL ENCOUNTER (EMERGENCY)
Facility: HOSPITAL | Age: 63
End: 2023-03-15
Attending: INTERNAL MEDICINE

## 2023-03-15 ENCOUNTER — HOSPITAL ENCOUNTER (INPATIENT)
Facility: HOSPITAL | Age: 63
LOS: 6 days | Discharge: HOME | End: 2023-03-21
Attending: NEUROLOGICAL SURGERY | Admitting: SURGERY

## 2023-03-15 VITALS
TEMPERATURE: 97.8 F | SYSTOLIC BLOOD PRESSURE: 150 MMHG | HEART RATE: 98 BPM | DIASTOLIC BLOOD PRESSURE: 82 MMHG | RESPIRATION RATE: 18 BRPM | OXYGEN SATURATION: 95 %

## 2023-03-15 DIAGNOSIS — S32.001A BURST FRACTURE OF LUMBAR VERTEBRA (HCC): Primary | ICD-10-CM

## 2023-03-15 DIAGNOSIS — M54.9 BACK PAIN: ICD-10-CM
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
## 2023-03-15 DIAGNOSIS — S32.001A CLOSED BURST FRACTURE OF LUMBAR VERTEBRA, INITIAL ENCOUNTER (HCC): Primary | ICD-10-CM

## 2023-03-15 LAB
ABO GROUP BLD: NORMAL
ABO GROUP BLD: NORMAL
ALBUMIN SERPL BCP-MCNC: 4.6 G/DL (ref 3.5–5)
ALP SERPL-CCNC: 54 U/L (ref 34–104)
ALT SERPL W P-5'-P-CCNC: 33 U/L (ref 7–52)
ANION GAP SERPL CALCULATED.3IONS-SCNC: 8 MMOL/L (ref 4–13)
APTT PPP: 22 SECONDS (ref 23–37)
AST SERPL W P-5'-P-CCNC: 39 U/L (ref 13–39)
ATRIAL RATE: 90 BPM
BASOPHILS # BLD AUTO: 0.01 THOUSANDS/ÂΜL (ref 0–0.1)
BASOPHILS NFR BLD AUTO: 0 % (ref 0–1)
BILIRUB SERPL-MCNC: 0.58 MG/DL (ref 0.2–1)
BLD GP AB SCN SERPL QL: NEGATIVE
BUN SERPL-MCNC: 20 MG/DL (ref 5–25)
CALCIUM SERPL-MCNC: 9.4 MG/DL (ref 8.4–10.2)
CHLORIDE SERPL-SCNC: 103 MMOL/L (ref 96–108)
CO2 SERPL-SCNC: 28 MMOL/L (ref 21–32)
CREAT SERPL-MCNC: 0.86 MG/DL (ref 0.6–1.3)
EOSINOPHIL # BLD AUTO: 0.02 THOUSAND/ÂΜL (ref 0–0.61)
EOSINOPHIL NFR BLD AUTO: 0 % (ref 0–6)
ERYTHROCYTE [DISTWIDTH] IN BLOOD BY AUTOMATED COUNT: 12.4 % (ref 11.6–15.1)
GFR SERPL CREATININE-BSD FRML MDRD: 92 ML/MIN/1.73SQ M
GLUCOSE SERPL-MCNC: 123 MG/DL (ref 65–140)
HCT VFR BLD AUTO: 44.5 % (ref 36.5–49.3)
HGB BLD-MCNC: 15.3 G/DL (ref 12–17)
IMM GRANULOCYTES # BLD AUTO: 0.07 THOUSAND/UL (ref 0–0.2)
IMM GRANULOCYTES NFR BLD AUTO: 1 % (ref 0–2)
INR PPP: 1 (ref 0.84–1.19)
LYMPHOCYTES # BLD AUTO: 0.71 THOUSANDS/ÂΜL (ref 0.6–4.47)
LYMPHOCYTES NFR BLD AUTO: 9 % (ref 14–44)
MCH RBC QN AUTO: 32.8 PG (ref 26.8–34.3)
MCHC RBC AUTO-ENTMCNC: 34.4 G/DL (ref 31.4–37.4)
MCV RBC AUTO: 95 FL (ref 82–98)
MONOCYTES # BLD AUTO: 0.43 THOUSAND/ÂΜL (ref 0.17–1.22)
MONOCYTES NFR BLD AUTO: 6 % (ref 4–12)
NEUTROPHILS # BLD AUTO: 6.28 THOUSANDS/ÂΜL (ref 1.85–7.62)
NEUTS SEG NFR BLD AUTO: 84 % (ref 43–75)
NRBC BLD AUTO-RTO: 0 /100 WBCS
P AXIS: 51 DEGREES
PLATELET # BLD AUTO: 160 THOUSANDS/UL (ref 149–390)
PMV BLD AUTO: 10.8 FL (ref 8.9–12.7)
POTASSIUM SERPL-SCNC: 4 MMOL/L (ref 3.5–5.3)
PR INTERVAL: 134 MS
PROT SERPL-MCNC: 7.3 G/DL (ref 6.4–8.4)
PROTHROMBIN TIME: 13.4 SECONDS (ref 11.6–14.5)
QRS AXIS: 49 DEGREES
QRSD INTERVAL: 92 MS
QT INTERVAL: 358 MS
QTC INTERVAL: 437 MS
RBC # BLD AUTO: 4.67 MILLION/UL (ref 3.88–5.62)
RH BLD: POSITIVE
RH BLD: POSITIVE
SODIUM SERPL-SCNC: 139 MMOL/L (ref 135–147)
SPECIMEN EXPIRATION DATE: NORMAL
T WAVE AXIS: 63 DEGREES
VENTRICULAR RATE: 90 BPM
WBC # BLD AUTO: 7.52 THOUSAND/UL (ref 4.31–10.16)

## 2023-03-15 RX ORDER — ENOXAPARIN SODIUM 100 MG/ML
30 INJECTION SUBCUTANEOUS EVERY 12 HOURS
Status: DISCONTINUED | OUTPATIENT
Start: 2023-03-15 | End: 2023-03-15

## 2023-03-15 RX ORDER — LORAZEPAM 1 MG/1
1 TABLET ORAL ONCE AS NEEDED
Status: COMPLETED | OUTPATIENT
Start: 2023-03-15 | End: 2023-03-15

## 2023-03-15 RX ORDER — OXYCODONE HYDROCHLORIDE 10 MG/1
10 TABLET ORAL EVERY 4 HOURS PRN
Status: DISCONTINUED | OUTPATIENT
Start: 2023-03-15 | End: 2023-03-20

## 2023-03-15 RX ORDER — FENTANYL CITRATE 50 UG/ML
2 INJECTION, SOLUTION INTRAMUSCULAR; INTRAVENOUS ONCE
Status: COMPLETED | OUTPATIENT
Start: 2023-03-15 | End: 2023-03-15

## 2023-03-15 RX ORDER — OXYCODONE HYDROCHLORIDE 5 MG/1
5 TABLET ORAL EVERY 4 HOURS PRN
Status: DISCONTINUED | OUTPATIENT
Start: 2023-03-15 | End: 2023-03-20

## 2023-03-15 RX ORDER — ACETAMINOPHEN 325 MG/1
975 TABLET ORAL EVERY 6 HOURS PRN
Status: DISCONTINUED | OUTPATIENT
Start: 2023-03-15 | End: 2023-03-17

## 2023-03-15 RX ORDER — HEPARIN SODIUM 5000 [USP'U]/ML
5000 INJECTION, SOLUTION INTRAVENOUS; SUBCUTANEOUS EVERY 8 HOURS SCHEDULED
Status: DISCONTINUED | OUTPATIENT
Start: 2023-03-15 | End: 2023-03-16

## 2023-03-15 RX ORDER — FENTANYL CITRATE 50 UG/ML
50 INJECTION, SOLUTION INTRAMUSCULAR; INTRAVENOUS ONCE
Status: COMPLETED | OUTPATIENT
Start: 2023-03-15 | End: 2023-03-15

## 2023-03-15 RX ORDER — GINSENG 100 MG
3 CAPSULE ORAL 2 TIMES DAILY
Status: DISCONTINUED | OUTPATIENT
Start: 2023-03-15 | End: 2023-03-21 | Stop reason: HOSPADM

## 2023-03-15 RX ORDER — FENTANYL CITRATE 50 UG/ML
1 INJECTION, SOLUTION INTRAMUSCULAR; INTRAVENOUS ONCE
Status: COMPLETED | OUTPATIENT
Start: 2023-03-15 | End: 2023-03-15

## 2023-03-15 RX ORDER — SODIUM CHLORIDE 9 MG/ML
75 INJECTION, SOLUTION INTRAVENOUS CONTINUOUS
Status: DISCONTINUED | OUTPATIENT
Start: 2023-03-16 | End: 2023-03-16

## 2023-03-15 RX ORDER — GINSENG 100 MG
3 CAPSULE ORAL 2 TIMES DAILY
Status: DISCONTINUED | OUTPATIENT
Start: 2023-03-15 | End: 2023-03-15

## 2023-03-15 RX ORDER — HYDROMORPHONE HCL/PF 1 MG/ML
0.5 SYRINGE (ML) INJECTION
Status: DISCONTINUED | OUTPATIENT
Start: 2023-03-15 | End: 2023-03-20

## 2023-03-15 RX ADMIN — LORAZEPAM 1 MG: 1 TABLET ORAL at 21:39

## 2023-03-15 RX ADMIN — FENTANYL CITRATE 50 MCG: 50 INJECTION INTRAMUSCULAR; INTRAVENOUS at 17:46

## 2023-03-15 RX ADMIN — HYDROMORPHONE HYDROCHLORIDE 0.5 MG: 1 INJECTION, SOLUTION INTRAMUSCULAR; INTRAVENOUS; SUBCUTANEOUS at 22:42

## 2023-03-15 RX ADMIN — TETANUS TOXOID, REDUCED DIPHTHERIA TOXOID AND ACELLULAR PERTUSSIS VACCINE, ADSORBED 0.5 ML: 5; 2.5; 8; 8; 2.5 SUSPENSION INTRAMUSCULAR at 20:27

## 2023-03-15 RX ADMIN — IOHEXOL 100 ML: 350 INJECTION, SOLUTION INTRAVENOUS at 16:16

## 2023-03-15 RX ADMIN — BACITRACIN 3 LARGE APPLICATION: 500 OINTMENT TOPICAL at 20:22

## 2023-03-15 RX ADMIN — OXYCODONE HYDROCHLORIDE 10 MG: 10 TABLET ORAL at 21:45

## 2023-03-15 RX ADMIN — FENTANYL CITRATE 50 MCG: 50 INJECTION INTRAMUSCULAR; INTRAVENOUS at 16:27

## 2023-03-15 RX ADMIN — ENOXAPARIN SODIUM 30 MG: 30 INJECTION SUBCUTANEOUS at 20:28

## 2023-03-15 NOTE — ED NOTES
Patient placed on 2L NC       Estefania Trejo [facility]  03/15/23 [id number]
[facility]

## 2023-03-15 NOTE — ED PROVIDER NOTES
Emergency Department Trauma Note  Misty Bañuelos  58 y o  male MRN: [id number]  Unit/Bed#: TR 03/TR 03 Encounter: [id number]      Trauma Alert: Trauma Acuity: Trauma Evaluation  Model of Arrival: Mode of Arrival: ALS via    Trauma Team: Current Providers  Attending Provider: Luisa Banuelos DO  Nurse Practitioner: VU Chris  Registered Nurse: Marylee Leader, RN  Consultants:     None      History of Present Illness     Chief Complaint:   Chief Complaint   Patient presents with   • Fall     Patient reports remodeling house and fell through first story and landed in basement  HPI:  Misty Bañuelos  is a 58 y o  male who presents with fall  Mechanism:Details of Incident: patient was rennovating home and fell through one story onto a cement basement floor  no loc  no headstrike, no thinners abrasion RLQ, bruising right lumbar, Right pointer finger abrasion  patient complaining of tailbone pain  ems gave 200mcg of fentanyl Injury Date: 03/15/23 Injury Time: 1530      Pt is a 66-year-old medical history arriving for evaluation after in the house he was remodeling  Patient estimates that this was between 10 to 12 feet  Patient reports that he fell directly onto his buttocks  Patient denies any LOC  Patient denies head strike  Patient denies any numbness or tingling distal extremities  Patient reports that he is having tailbone pain  Patient has been provided 200 mcgs of fentanyl by EMS prior to arrival   Denies any anticoagulation, antiplatelets, including aspirin  Patient reports that he was working in the house with a friend, who is able to get him up  Patient denies any lower extremity pain, weakness, tingling  Patient states had to have a bowel movement prior to the fall, and when he was laying there he was unable to "hold it and had his bowel movement "  Patient then got up and was able to take a shower prior to arriving  Pt reports this occurred at [phone]   Patient reports he was on her way to the bathroom to have a BM when he fell through the floor, and upon striking the floor, had the BM  Patient has rectal tone  Review of Systems   Constitutional: Negative  HENT: Negative  Eyes: Negative  Respiratory: Negative  Cardiovascular: Negative  Gastrointestinal: Negative  Endocrine: Negative  Genitourinary: Negative  Musculoskeletal: Positive for back pain  Skin: Negative  Allergic/Immunologic: Negative  Neurological: Negative  Hematological: Negative  Psychiatric/Behavioral: Negative  All other systems reviewed and are negative        Historical Information     Immunizations:   Immunization History   Administered Date(s) Administered   • COVID-19 PFIZER VACCINE 0 3 ML IM 03/27/2021, 04/17/2021   • INFLUENZA 10/16/2018   • Influenza Injectable, MDCK, Preservative Free, Quadrivalent 11/13/2019   • Influenza, recombinant, quadrivalent,injectable, preservative free 11/23/2022   • Tdap 11/23/2022, 03/15/2023       Past Medical History:   Diagnosis Date   • Known health problems: none        Family History   Problem Relation Age of Onset   • Breast cancer Mother    • Heart disease Father    • No Known Problems Sister    • No Known Problems Brother      Past Surgical History:   Procedure Laterality Date   • ROTATOR CUFF REPAIR Right    • SHOULDER SURGERY       Social History     Tobacco Use   • Smoking status: Never   • Smokeless tobacco: Never   Substance Use Topics   • Alcohol use: Yes     Comment: social   • Drug use: Never     E-Cigarette/Vaping     E-Cigarette/Vaping Substances   • Nicotine No    • THC No    • CBD No    • Flavoring No    • Other No    • Unknown No        Family History: non-contributory    Meds/Allergies   None       No Known Allergies    PHYSICAL EXAM    PE limited by:     Objective   Vitals:   First set: Temperature: 97 8 °F (36 6 °C) (03/15/23 1550)  Pulse: 84 (03/15/23 1550)  Respirations: 18 (03/15/23 1550)  Blood Pressure: (!) 170/104 (03/15/23 1550)  SpO2: 91 % (03/15/23 1550)    Primary Survey:   (A) Airway: patent  (B) Breathing: lungs CTA b/l  (C) Circulation: Pulses:   normal  (D) Disabliity:  GCS Total:  15  (E) Expose:  Completed    Portable chest xray not obtained as there was no chest wall tenderness  Secondary Survey: (Click on Physical Exam tab above)  Physical Exam  Vitals and nursing note reviewed  Constitutional:       Appearance: Normal appearance  He is normal weight  HENT:      Right Ear: External ear normal       Left Ear: External ear normal       Nose: Nose normal       Mouth/Throat:      Pharynx: Oropharynx is clear  Eyes:      General:         Right eye: No discharge  Left eye: No discharge  Extraocular Movements: Extraocular movements intact  Conjunctiva/sclera: Conjunctivae normal       Pupils: Pupils are equal, round, and reactive to light  Cardiovascular:      Rate and Rhythm: Normal rate and regular rhythm  Pulses: Normal pulses  Heart sounds: Normal heart sounds  Pulmonary:      Effort: Pulmonary effort is normal  No respiratory distress  Breath sounds: Normal breath sounds  Abdominal:      General: Abdomen is flat  Bowel sounds are normal  There is no distension  Palpations: Abdomen is soft  There is no mass  Tenderness: There is no abdominal tenderness  There is no right CVA tenderness, left CVA tenderness, guarding or rebound  Genitourinary:     Rectum: Normal    Musculoskeletal:         General: Tenderness and signs of injury present  Right shoulder: Normal       Left shoulder: Normal       Right upper arm: Normal       Left upper arm: Normal       Right elbow: Normal       Left elbow: Normal       Right forearm: Normal       Left forearm: Normal       Right hand: No swelling or deformity  Left hand: Normal         Hands:       Cervical back: Normal, normal range of motion and neck supple        Thoracic back: Normal  Lumbar back: Normal         Back:       Right hip: Normal  No tenderness  Left hip: Normal  No tenderness  Right upper leg: Normal       Left upper leg: Normal       Right knee: Normal       Left knee: Normal       Right lower leg: Normal       Left lower leg: Normal       Right ankle: Normal       Left ankle: Normal         Legs:    Skin:     Capillary Refill: Capillary refill takes less than 2 seconds  Findings: Abrasion present  Neurological:      General: No focal deficit present  Mental Status: He is alert  GCS: GCS eye subscore is 4  GCS verbal subscore is 5  GCS motor subscore is 6  Cranial Nerves: Cranial nerves 2-12 are intact  No cranial nerve deficit  Sensory: Sensation is intact  Motor: Motor function is intact  Comments: 5/5 upper extremity strength, no numbness or tingling   5/5 lower extremity strength, no numbness or tingling   Psychiatric:         Mood and Affect: Mood normal          Thought Content: Thought content normal          Cervical spine cleared by clinical criteria?  No (imaging required)      Invasive Devices     Peripheral Intravenous Line  Duration           Peripheral IV Right -- days    Peripheral IV 03/15/23 Left Antecubital <1 day                Lab Results:   Results Reviewed     Procedure Component Value Units Date/Time    Comprehensive metabolic panel [[id number]] Collected: 03/15/23 1559    Lab Status: Final result Specimen: Blood from Arm, Right Updated: 03/15/23 1621     Sodium 139 mmol/L      Potassium 4 0 mmol/L      Chloride 103 mmol/L      CO2 28 mmol/L      ANION GAP 8 mmol/L      BUN 20 mg/dL      Creatinine 0 86 mg/dL      Glucose 123 mg/dL      Calcium 9 4 mg/dL      AST 39 U/L      ALT 33 U/L      Alkaline Phosphatase 54 U/L      Total Protein 7 3 g/dL      Albumin 4 6 g/dL      Total Bilirubin 0 58 mg/dL      eGFR 92 ml/min/1 73sq m     Narrative:      [place] guidelines for Chronic Kidney Disease (CKD):   •  Stage 1 with normal or high GFR (GFR > 90 mL/min/1 73 square meters)  •  Stage 2 Mild CKD (GFR = 60-89 mL/min/1 73 square meters)  •  Stage 3A Moderate CKD (GFR = 45-59 mL/min/1 73 square meters)  •  Stage 3B Moderate CKD (GFR = 30-44 mL/min/1 73 square meters)  •  Stage 4 Severe CKD (GFR = 15-29 mL/min/1 73 square meters)  •  Stage 5 End Stage CKD (GFR <15 mL/min/1 73 square meters)  Note: GFR calculation is accurate only with a steady state creatinine    CBC and differential [[id number]]  (Abnormal) Collected: 03/15/23 1559    Lab Status: Final result Specimen: Blood from Arm, Right Updated: 03/15/23 1604     WBC 7 52 Thousand/uL      RBC 4 67 Million/uL      Hemoglobin 15 3 g/dL      Hematocrit 44 5 %      MCV 95 fL      MCH 32 8 pg      MCHC 34 4 g/dL      RDW 12 4 %      MPV 10 8 fL      Platelets 500 Thousands/uL      nRBC 0 /100 WBCs      Neutrophils Relative 84 %      Immat GRANS % 1 %      Lymphocytes Relative 9 %      Monocytes Relative 6 %      Eosinophils Relative 0 %      Basophils Relative 0 %      Neutrophils Absolute 6 28 Thousands/µL      Immature Grans Absolute 0 07 Thousand/uL      Lymphocytes Absolute 0 71 Thousands/µL      Monocytes Absolute 0 43 Thousand/µL      Eosinophils Absolute 0 02 Thousand/µL      Basophils Absolute 0 01 Thousands/µL                  Imaging Studies:   Direct to CT: Yes  XR finger right second digit-index   ED Interpretation by VU Muñoz (03/15 2035)   No obvious fracture      TRAUMA - CT chest abdomen pelvis w contrast   Final Result by Yvonne Livingston MD (03/15 1655)      1  L1 burst fracture and right L1 transverse process fracture  Moderate narrowing of the central canal    2   No evidence of acute trauma in the chest, abdomen or pelvis               I personally discussed this study with Mariajose Jain on 3/15/2023 at 4:37 PM                      Workstation performed: [id number]         CT recon only thoracolumbar (no charge)   Final Result by Mercedes Callahan MD (03/15 1655)      1  L1 burst fracture  At least moderate central canal stenosis  2   Right L1 transverse process fracture  Workstation performed: [id number]         TRAUMA - CT head wo contrast   Final Result by Mercedes Callahan MD (03/15 1622)      No acute intracranial abnormality  Workstation performed: [id number]         TRAUMA - CT spine cervical wo contrast   Final Result by Mercedes Callahan MD (03/15 1625)      No acute cervical spine fracture or traumatic malalignment  Workstation performed: [id number]         XR chest 1 view portable   ED Interpretation by VU Hooper (03/15 2033)   No obvious rib fracture, no pneumothorax, hemothorax, no acute cardiopulmonary disease  XR pelvis ap only 1 or 2 vw   ED Interpretation by VU Hooper (03/15 2033)   No obvious pelvic fracture  Procedures  POC FAST US    Date/Time: 3/15/2023 3:55 PM  Performed by: VU Hooper  Authorized by: VU Hooper     Patient location:  ED  Procedure details:     Exam Type:  Diagnostic    Indications: blunt abdominal trauma and blunt chest trauma      Technique: FAST      Views obtained:  Heart - Pericardial sac, RUQ - [place]'s Pouch, LUQ - Splenorenal space and Suprapubic - Pouch of Sushant    Image quality: diagnostic      Image availability:  Not obtained due to urgency  FAST Findings:     RUQ (Hepatorenal) free fluid: absent      LUQ (Splenorenal) free fluid: absent      Suprapubic free fluid: absent      Cardiac wall motion: identified      Pericardial effusion: absent    Interpretation:     Impressions: negative               ED Course  ED Course as of 03/15/23 2039   Wed Mar 15, 2023   1608 Xrays were reviewed at this time              Medical Decision Making  DDx: Thoracic fracture, lumbar fracture, intercranial hemorrhage, pelvic fracture, coccyx fracture  Patient is a 60-year-old male without significant past medical history that fell to the floor when he was on the way to the bathroom  Other reports that she immediately had a bowel movement, this is likely due to the fact that he was on his way to the bathroom, as he arrives with rectal tone intact  Patient has been able to stand, without reports of bilateral leg pain, and discharge afterwards  Injury occurred around 13:30-13:45  Pt denies AC/AP including ASA  Due to significance of mechanism, will reyna scan patient  Patient has no T-spine, L-spine pain, however given mechanism of injury will check T and L-spine recons  Patient reports that that he has coccyx pain  Denies any numbness or tingling to his distal extremities, pain all extremities equally  Patient denies any weakness in his legs when he was walking, after he took a shower  Patient has an abrasion to right lower abdomen  While in CT scan pt had a 2L/min nc oxygen requirement in the presence of 200 mcg IVP by EMS  Denies any lower extremity pain  Does have abrasions to his right abdomen, and right index finger  No acute intervention required for finger laceration as this was superficial  Patient has full ROM of index finger to right hand  She has no acute findings on blood work as he has no leukocytosis, no SANGITA, no electrolyte abnormalities, no anemia  Pt UTD on tetanus after review of records showing tetanus shot given in 11/22  Patient found to have L1 burst fracture  Moderate central canal stenosis and L1 right transverse process fx  Pt reassessed with no acute change to his neuro exam, no change to rectal tone  Discussed case with Dr Albertina Le with recommendation with transfer to [facility] for trauma and neurosurgery  Patient in agreement for transfer  At time of transfer no acute changes to mental status, neurologic status, no episodes of hypotension or tachycardia   Patient provided with additional pain relief for back pain prior to transport  Patient AAO x 4  Back pain: acute illness or injury  Burst fracture of lumbar vertebra [facility]): acute illness or injury  Fall, initial encounter: acute illness or injury  Amount and/or Complexity of Data Reviewed  Labs: ordered  Radiology: ordered  Risk  Prescription drug management  Disposition  [facility] Transfer: No  Final diagnoses:   Burst fracture of lumbar [facility] ([facility] )   Back pain   Fall, initial encounter     Time reflects when diagnosis was documented in both MDM as applicable and the Disposition within this note     Time User Action Codes Description Comment    3/15/2023  4:54 PM Park Coley Add [S32 001A] Burst fracture of lumbar vertebra ([facility] )     3/15/2023  4:54 PM Park Stains Add [M54 9] Back pain     3/15/2023  4:55 PM Park Coley Add [U76  [place] Wendy, initial encounter       ED Disposition     ED Disposition   Transfer to Another Facility-In Network    Condition   --    Date/Time   Wed Mar 15, 2023  4:54 PM    Comment   Silke Nessw  should be transferred out to [facility]           MD Documentation    [address] Most Recent Value   Patient Condition The patient has been stabilized such that within reasonable medical probability, no material deterioration of the patient condition or the condition of the unborn child(kristine) is likely to result from the transfer   Reason for Transfer Level of Care needed not available at this facility   Benefits of Transfer Specialized equipment and/or services available at the receiving facility (Include comment)________________________  [trauma/neurosurgery]   Risks of Transfer Potential for delay in receiving treatment, Potential deterioration of medical condition, Loss of IV, Increased discomfort during transfer, Possible worsening of condition or death during transfer   Accepting Physician Consuelo      Follow-up Information    None       There are no discharge medications for this patient  No discharge procedures on file      PDMP Review       Value Time User    PDMP Reviewed  Yes 2/10/2020 11:25 AM Wilhelm Osgood, PA-C          ED Provider  Electronically Signed by         VU Foy  03/15/23 2039

## 2023-03-15 NOTE — TELEMEDICINE
Neurosurgery e-Consult ([facility])     Adeel Naranjo  58 y o  male MRN: [id number]  Unit/Bed#: ED 08 Encounter: [id number]    Contacted by ED provider Belem Thakkar  Reason for consult: Traumatic L1 burst fracture    Patient is a 20-year-old male with no significant PMH who presents after fall from ~10 ft  Available past medical history, social history, surgical history, medication list, drug allergies and review of systems were reviewed  /96   Pulse 86   Temp 98 1 °F (36 7 °C) (Oral)   Resp 17   SpO2 95%      Per report, he is neurologically intact without saddle anesthesia  I personally reviewed all relevant imaging:  CTH and CT c-spine negative for acutely concerning findings  CT CAP showed L1 burst fracture with ~50% loss in vertebral body height, mild retropulsion of posterior vertebral wall, fracture extending to right pedicle, lamina, and inferior facet  Not reported to take any AC/AP in an outpatient setting  Assessment and Recommendations    1  Recommend MRI lumbar spine without contrast and baseline lumbar XR when able  2  No emergent neurosurgical intervention tonight given intact exam  3  Continue spine precautions with bedrest and TLSO brace for now  4  Pain control per primary team    All questions and concerns were addressed  Provider is in agreement with the course of action  31 + minutes, >50% of the total time devoted to medical consultative verbal/EMR discussion between providers  Written report will be generated in the EMR  Christina LEOS    Neurosurgeon On Call

## 2023-03-15 NOTE — EMTALA/ACUTE CARE TRANSFER
[address] [facility] [address]  Dept: [phone]      EMTALA TRANSFER CONSENT    NAME Mireya Rawls   1960                              MRN [id number]    I have been informed of my rights regarding examination, treatment, and transfer   by Dr Yuniel Hi DO    Benefits: Specialized equipment and/or services available at the receiving facility (Include comment)________________________ (trauma/neurosurgery)    Risks: Potential for delay in receiving treatment, Potential deterioration of medical condition, Loss of IV, Increased discomfort during transfer, Possible worsening of condition or death during transfer      Transfer Request   I acknowledge that my medical condition has been evaluated and explained to me by the emergency department physician or other qualified medical person and/or my attending physician who has recommended and offered to me further medical examination and treatment  I understand the Hospital's obligation with respect to the treatment and stabilization of my emergency medical condition  I nevertheless request to be transferred  I release the Hospital, the doctor, and any other persons caring for me from all responsibility or liability for any injury or ill effects that may result from my transfer and agree to accept all responsibility for the consequences of my choice to transfer, rather than receive stabilizing treatment at the Hospital  I understand that because the transfer is my request, my insurance may not provide reimbursement for the services  The Hospital will assist and direct me and my family in how to make arrangements for transfer, but the hospital is not liable for any fees charged by the transport service  In spite of this understanding, I refuse to consent to further medical examination and treatment which has been offered to me, and request transfer to    I authorize the performance of emergency medical procedures and treatments upon me in both transit and upon arrival at the receiving facility  Additionally, I authorize the release of any and all medical records to the receiving facility and request they be transported with me, if possible  I authorize the performance of emergency medical procedures and treatments upon me in both transit and upon arrival at the receiving facility  Additionally, I authorize the release of any and all medical records to the receiving facility and request they be transported with me, if possible  I understand that the safest mode of transportation during a medical emergency is an ambulance and that the Hospital advocates the use of this mode of transport  Risks of traveling to the receiving facility by car, including absence of medical control, life sustaining equipment, such as oxygen, and medical personnel has been explained to me and I fully understand them  (LALO CORRECT BOX BELOW)  [  ]  I consent to the stated transfer and to be transported by ambulance/helicopter  [  ]  I consent to the stated transfer, but refuse transportation by ambulance and accept full responsibility for my transportation by car  I understand the risks of non-ambulance transfers and I exonerate the Hospital and its staff from any deterioration in my condition that results from this refusal     X___________________________________________    DATE  03/15/23  TIME________  Signature of patient or legally responsible individual signing on patient behalf           RELATIONSHIP TO PATIENT_________________________          Provider Certification    NAME Maxime Caballero  [facility] 1960                              MRN [id number]    A medical screening exam was performed on the above named patient    Based on the examination:    Condition Necessitating Transfer The primary encounter diagnosis was Burst fracture of lumbar vertebra ([facility] [address] )  Diagnoses of Back pain and Fall, initial encounter were also pertinent to this visit  Patient Condition: The patient has been stabilized such that within reasonable medical probability, no material deterioration of the patient condition or the condition of the unborn child(kristine) is likely to result from the transfer    Reason for Transfer: Level of Care needed not available at this facility    Transfer Requirements: Facility     · Space available and qualified personnel available for treatment as acknowledged by    · Agreed to accept transfer and to provide appropriate medical treatment as acknowledged by       Dejan Dixon  · Appropriate medical records of the examination and treatment of the patient are provided at the time of transfer   [address] [facility], [address] _______  · Transfer will be performed by qualified personnel from    and appropriate transfer equipment as required, including the use of necessary and appropriate life support measures  Provider Certification: I have examined the patient and explained the following risks and benefits of being transferred/refusing transfer to the patient/family:         Based on these reasonable risks and benefits to the patient and/or the unborn child(kristine), and based upon the information available at the time of the patient’s examination, I certify that the medical benefits reasonably to be expected from the provision of appropriate medical treatments at another medical facility outweigh the increasing risks, if any, to the individual’s medical condition, and in the case of labor to the unborn child, from effecting the transfer      X____________________________________________ DATE 03/15/23        TIME_______      ORIGINAL - SEND TO MEDICAL RECORDS   COPY - SEND WITH PATIENT DURING TRANSFER

## 2023-03-16 ENCOUNTER — ANESTHESIA EVENT (INPATIENT)
Dept: PERIOP | Facility: HOSPITAL | Age: 63
End: 2023-03-16

## 2023-03-16 ENCOUNTER — ANESTHESIA (INPATIENT)
Dept: PERIOP | Facility: HOSPITAL | Age: 63
End: 2023-03-16

## 2023-03-16 ENCOUNTER — APPOINTMENT (INPATIENT)
Dept: RADIOLOGY | Facility: HOSPITAL | Age: 63
End: 2023-03-16

## 2023-03-16 PROBLEM — R33.9 URINARY RETENTION: Status: ACTIVE | Noted: 2023-03-16

## 2023-03-16 PROBLEM — Z79.899 DVT PROPHYLAXIS: Status: ACTIVE | Noted: 2023-03-16

## 2023-03-16 PROBLEM — Z29.9 DVT PROPHYLAXIS: Status: ACTIVE | Noted: 2023-03-16

## 2023-03-16 PROBLEM — M54.50 ACUTE MIDLINE LOW BACK PAIN WITHOUT SCIATICA: Status: ACTIVE | Noted: 2023-03-16

## 2023-03-16 PROBLEM — IMO0001 DVT PROPHYLAXIS: Status: ACTIVE | Noted: 2023-03-16

## 2023-03-16 PROBLEM — S32.012A CLOSED UNSTABLE BURST FRACTURE OF FIRST LUMBAR VERTEBRA (HCC): Status: ACTIVE | Noted: 2023-03-16

## 2023-03-16 LAB
ABO GROUP BLD: NORMAL
ANION GAP SERPL CALCULATED.3IONS-SCNC: 4 MMOL/L (ref 4–13)
BASOPHILS # BLD AUTO: 0.02 THOUSANDS/ÂΜL (ref 0–0.1)
BASOPHILS NFR BLD AUTO: 0 % (ref 0–1)
BLD GP AB SCN SERPL QL: NEGATIVE
BUN SERPL-MCNC: 18 MG/DL (ref 5–25)
CA-I BLD-SCNC: 1.08 MMOL/L (ref 1.12–1.32)
CALCIUM SERPL-MCNC: 8.6 MG/DL (ref 8.3–10.1)
CHLORIDE SERPL-SCNC: 108 MMOL/L (ref 96–108)
CO2 SERPL-SCNC: 27 MMOL/L (ref 21–32)
CREAT SERPL-MCNC: 0.73 MG/DL (ref 0.6–1.3)
EOSINOPHIL # BLD AUTO: 0.01 THOUSAND/ÂΜL (ref 0–0.61)
EOSINOPHIL NFR BLD AUTO: 0 % (ref 0–6)
ERYTHROCYTE [DISTWIDTH] IN BLOOD BY AUTOMATED COUNT: 12.8 % (ref 11.6–15.1)
GFR SERPL CREATININE-BSD FRML MDRD: 99 ML/MIN/1.73SQ M
GLUCOSE SERPL-MCNC: 119 MG/DL (ref 65–140)
HCT VFR BLD AUTO: 42.1 % (ref 36.5–49.3)
HGB BLD-MCNC: 14.1 G/DL (ref 12–17)
IMM GRANULOCYTES # BLD AUTO: 0.03 THOUSAND/UL (ref 0–0.2)
IMM GRANULOCYTES NFR BLD AUTO: 0 % (ref 0–2)
LYMPHOCYTES # BLD AUTO: 0.94 THOUSANDS/ÂΜL (ref 0.6–4.47)
LYMPHOCYTES NFR BLD AUTO: 12 % (ref 14–44)
MAGNESIUM SERPL-MCNC: 2.3 MG/DL (ref 1.6–2.6)
MCH RBC QN AUTO: 32.3 PG (ref 26.8–34.3)
MCHC RBC AUTO-ENTMCNC: 33.5 G/DL (ref 31.4–37.4)
MCV RBC AUTO: 97 FL (ref 82–98)
MONOCYTES # BLD AUTO: 0.64 THOUSAND/ÂΜL (ref 0.17–1.22)
MONOCYTES NFR BLD AUTO: 8 % (ref 4–12)
NEUTROPHILS # BLD AUTO: 6.09 THOUSANDS/ÂΜL (ref 1.85–7.62)
NEUTS SEG NFR BLD AUTO: 80 % (ref 43–75)
NRBC BLD AUTO-RTO: 0 /100 WBCS
PHOSPHATE SERPL-MCNC: 4.1 MG/DL (ref 2.3–4.1)
PLATELET # BLD AUTO: 144 THOUSANDS/UL (ref 149–390)
PMV BLD AUTO: 11.3 FL (ref 8.9–12.7)
POTASSIUM SERPL-SCNC: 3.6 MMOL/L (ref 3.5–5.3)
RBC # BLD AUTO: 4.36 MILLION/UL (ref 3.88–5.62)
RH BLD: POSITIVE
SODIUM SERPL-SCNC: 139 MMOL/L (ref 135–147)
SPECIMEN EXPIRATION DATE: NORMAL
WBC # BLD AUTO: 7.73 THOUSAND/UL (ref 4.31–10.16)

## 2023-03-16 PROCEDURE — 0RG7071 FUSION OF 2 TO 7 THORACIC VERTEBRAL JOINTS WITH AUTOLOGOUS TISSUE SUBSTITUTE, POSTERIOR APPROACH, POSTERIOR COLUMN, OPEN APPROACH: ICD-10-PCS | Performed by: NEUROLOGICAL SURGERY

## 2023-03-16 DEVICE — SCREW 54850046550 4.75VOYAGER ATS 6.5X50
Type: IMPLANTABLE DEVICE | Site: SPINE LUMBAR | Status: FUNCTIONAL
Brand: CD HORIZON® SOLERA® VOYAGER™ SPINAL SYSTEM

## 2023-03-16 DEVICE — ROD 1476006160 4.75 CCM+ STRT PERC 160MM
Type: IMPLANTABLE DEVICE | Site: SPINE LUMBAR | Status: FUNCTIONAL
Brand: CD HORIZON® SPINAL SYSTEM

## 2023-03-16 DEVICE — SCREW SET 4.75MM SOLERA PERC: Type: IMPLANTABLE DEVICE | Site: SPINE LUMBAR | Status: FUNCTIONAL

## 2023-03-16 RX ORDER — METHOCARBAMOL 750 MG/1
750 TABLET, FILM COATED ORAL EVERY 6 HOURS SCHEDULED
Status: DISCONTINUED | OUTPATIENT
Start: 2023-03-16 | End: 2023-03-21 | Stop reason: HOSPADM

## 2023-03-16 RX ORDER — SUCCINYLCHOLINE/SOD CL,ISO/PF 100 MG/5ML
SYRINGE (ML) INTRAVENOUS AS NEEDED
Status: DISCONTINUED | OUTPATIENT
Start: 2023-03-16 | End: 2023-03-16

## 2023-03-16 RX ORDER — ENOXAPARIN SODIUM 100 MG/ML
30 INJECTION SUBCUTANEOUS EVERY 12 HOURS SCHEDULED
Status: CANCELLED | OUTPATIENT
Start: 2023-03-16

## 2023-03-16 RX ORDER — SODIUM CHLORIDE, SODIUM LACTATE, POTASSIUM CHLORIDE, CALCIUM CHLORIDE 600; 310; 30; 20 MG/100ML; MG/100ML; MG/100ML; MG/100ML
125 INJECTION, SOLUTION INTRAVENOUS CONTINUOUS
Status: CANCELLED | OUTPATIENT
Start: 2023-03-16

## 2023-03-16 RX ORDER — SODIUM CHLORIDE 9 MG/ML
75 INJECTION, SOLUTION INTRAVENOUS CONTINUOUS
Status: DISCONTINUED | OUTPATIENT
Start: 2023-03-16 | End: 2023-03-20

## 2023-03-16 RX ORDER — CALCIUM CARBONATE 200(500)MG
1000 TABLET,CHEWABLE ORAL DAILY PRN
Status: DISCONTINUED | OUTPATIENT
Start: 2023-03-16 | End: 2023-03-21 | Stop reason: HOSPADM

## 2023-03-16 RX ORDER — PROPOFOL 10 MG/ML
INJECTION, EMULSION INTRAVENOUS CONTINUOUS PRN
Status: DISCONTINUED | OUTPATIENT
Start: 2023-03-16 | End: 2023-03-16

## 2023-03-16 RX ORDER — CEFAZOLIN SODIUM 1 G/50ML
1000 SOLUTION INTRAVENOUS EVERY 8 HOURS
Status: COMPLETED | OUTPATIENT
Start: 2023-03-16 | End: 2023-03-17

## 2023-03-16 RX ORDER — FENTANYL CITRATE/PF 50 MCG/ML
25 SYRINGE (ML) INJECTION
Status: DISCONTINUED | OUTPATIENT
Start: 2023-03-16 | End: 2023-03-16 | Stop reason: HOSPADM

## 2023-03-16 RX ORDER — SENNOSIDES 8.6 MG
1 TABLET ORAL
Status: DISCONTINUED | OUTPATIENT
Start: 2023-03-16 | End: 2023-03-21 | Stop reason: HOSPADM

## 2023-03-16 RX ORDER — DEXAMETHASONE SODIUM PHOSPHATE 10 MG/ML
INJECTION, SOLUTION INTRAMUSCULAR; INTRAVENOUS AS NEEDED
Status: DISCONTINUED | OUTPATIENT
Start: 2023-03-16 | End: 2023-03-16

## 2023-03-16 RX ORDER — METOPROLOL TARTRATE 5 MG/5ML
5 INJECTION INTRAVENOUS EVERY 6 HOURS
Status: DISCONTINUED | OUTPATIENT
Start: 2023-03-16 | End: 2023-03-17

## 2023-03-16 RX ORDER — PROPOFOL 10 MG/ML
INJECTION, EMULSION INTRAVENOUS AS NEEDED
Status: DISCONTINUED | OUTPATIENT
Start: 2023-03-16 | End: 2023-03-16

## 2023-03-16 RX ORDER — HYDROMORPHONE HCL/PF 1 MG/ML
0.5 SYRINGE (ML) INJECTION ONCE
Status: COMPLETED | OUTPATIENT
Start: 2023-03-16 | End: 2023-03-16

## 2023-03-16 RX ORDER — FENTANYL CITRATE 50 UG/ML
INJECTION, SOLUTION INTRAMUSCULAR; INTRAVENOUS AS NEEDED
Status: DISCONTINUED | OUTPATIENT
Start: 2023-03-16 | End: 2023-03-16

## 2023-03-16 RX ORDER — BISACODYL 10 MG
10 SUPPOSITORY, RECTAL RECTAL DAILY PRN
Status: DISCONTINUED | OUTPATIENT
Start: 2023-03-16 | End: 2023-03-21 | Stop reason: HOSPADM

## 2023-03-16 RX ORDER — GABAPENTIN 300 MG/1
300 CAPSULE ORAL 3 TIMES DAILY
Status: DISCONTINUED | OUTPATIENT
Start: 2023-03-16 | End: 2023-03-21 | Stop reason: HOSPADM

## 2023-03-16 RX ORDER — SODIUM CHLORIDE 9 MG/ML
100 INJECTION, SOLUTION INTRAVENOUS CONTINUOUS
Status: DISCONTINUED | OUTPATIENT
Start: 2023-03-16 | End: 2023-03-16

## 2023-03-16 RX ORDER — SODIUM CHLORIDE 9 MG/ML
INJECTION, SOLUTION INTRAVENOUS CONTINUOUS PRN
Status: DISCONTINUED | OUTPATIENT
Start: 2023-03-16 | End: 2023-03-16

## 2023-03-16 RX ORDER — MIDAZOLAM HYDROCHLORIDE 2 MG/2ML
INJECTION, SOLUTION INTRAMUSCULAR; INTRAVENOUS AS NEEDED
Status: DISCONTINUED | OUTPATIENT
Start: 2023-03-16 | End: 2023-03-16

## 2023-03-16 RX ORDER — HYDROMORPHONE HCL/PF 1 MG/ML
SYRINGE (ML) INJECTION AS NEEDED
Status: DISCONTINUED | OUTPATIENT
Start: 2023-03-16 | End: 2023-03-16

## 2023-03-16 RX ORDER — ONDANSETRON 2 MG/ML
4 INJECTION INTRAMUSCULAR; INTRAVENOUS EVERY 6 HOURS PRN
Status: DISCONTINUED | OUTPATIENT
Start: 2023-03-16 | End: 2023-03-21 | Stop reason: HOSPADM

## 2023-03-16 RX ORDER — ENOXAPARIN SODIUM 100 MG/ML
40 INJECTION SUBCUTANEOUS
Status: DISCONTINUED | OUTPATIENT
Start: 2023-03-17 | End: 2023-03-20

## 2023-03-16 RX ORDER — HYDROMORPHONE HCL/PF 1 MG/ML
0.5 SYRINGE (ML) INJECTION
Status: DISCONTINUED | OUTPATIENT
Start: 2023-03-16 | End: 2023-03-16 | Stop reason: HOSPADM

## 2023-03-16 RX ORDER — SODIUM CHLORIDE, SODIUM LACTATE, POTASSIUM CHLORIDE, CALCIUM CHLORIDE 600; 310; 30; 20 MG/100ML; MG/100ML; MG/100ML; MG/100ML
INJECTION, SOLUTION INTRAVENOUS CONTINUOUS PRN
Status: DISCONTINUED | OUTPATIENT
Start: 2023-03-16 | End: 2023-03-16

## 2023-03-16 RX ORDER — BUPIVACAINE HYDROCHLORIDE 2.5 MG/ML
INJECTION, SOLUTION EPIDURAL; INFILTRATION; INTRACAUDAL AS NEEDED
Status: DISCONTINUED | OUTPATIENT
Start: 2023-03-16 | End: 2023-03-16 | Stop reason: HOSPADM

## 2023-03-16 RX ORDER — ONDANSETRON 2 MG/ML
4 INJECTION INTRAMUSCULAR; INTRAVENOUS ONCE AS NEEDED
Status: DISCONTINUED | OUTPATIENT
Start: 2023-03-16 | End: 2023-03-16 | Stop reason: HOSPADM

## 2023-03-16 RX ORDER — DOCUSATE SODIUM 100 MG/1
100 CAPSULE, LIQUID FILLED ORAL 2 TIMES DAILY
Status: DISCONTINUED | OUTPATIENT
Start: 2023-03-16 | End: 2023-03-21 | Stop reason: HOSPADM

## 2023-03-16 RX ORDER — CEFAZOLIN SODIUM 1 G/3ML
INJECTION, POWDER, FOR SOLUTION INTRAMUSCULAR; INTRAVENOUS AS NEEDED
Status: DISCONTINUED | OUTPATIENT
Start: 2023-03-16 | End: 2023-03-16

## 2023-03-16 RX ORDER — LIDOCAINE 50 MG/G
2 PATCH TOPICAL DAILY
Status: DISCONTINUED | OUTPATIENT
Start: 2023-03-17 | End: 2023-03-21 | Stop reason: HOSPADM

## 2023-03-16 RX ORDER — ONDANSETRON 2 MG/ML
INJECTION INTRAMUSCULAR; INTRAVENOUS AS NEEDED
Status: DISCONTINUED | OUTPATIENT
Start: 2023-03-16 | End: 2023-03-16

## 2023-03-16 RX ORDER — LIDOCAINE HYDROCHLORIDE 10 MG/ML
INJECTION, SOLUTION EPIDURAL; INFILTRATION; INTRACAUDAL; PERINEURAL AS NEEDED
Status: DISCONTINUED | OUTPATIENT
Start: 2023-03-16 | End: 2023-03-16

## 2023-03-16 RX ORDER — CHLORHEXIDINE GLUCONATE 0.12 MG/ML
15 RINSE ORAL ONCE
Status: COMPLETED | OUTPATIENT
Start: 2023-03-16 | End: 2023-03-16

## 2023-03-16 RX ADMIN — CEFAZOLIN SODIUM 1000 MG: 1 SOLUTION INTRAVENOUS at 20:43

## 2023-03-16 RX ADMIN — METHOCARBAMOL 750 MG: 750 TABLET ORAL at 17:48

## 2023-03-16 RX ADMIN — ACETAMINOPHEN 975 MG: 325 TABLET ORAL at 05:37

## 2023-03-16 RX ADMIN — SODIUM CHLORIDE, SODIUM LACTATE, POTASSIUM CHLORIDE, AND CALCIUM CHLORIDE: .6; .31; .03; .02 INJECTION, SOLUTION INTRAVENOUS at 12:23

## 2023-03-16 RX ADMIN — PROPOFOL 150 MCG/KG/MIN: 10 INJECTION, EMULSION INTRAVENOUS at 15:00

## 2023-03-16 RX ADMIN — PROPOFOL 180 MG: 10 INJECTION, EMULSION INTRAVENOUS at 12:30

## 2023-03-16 RX ADMIN — HYDROMORPHONE HYDROCHLORIDE 0.5 MG: 1 INJECTION, SOLUTION INTRAMUSCULAR; INTRAVENOUS; SUBCUTANEOUS at 17:48

## 2023-03-16 RX ADMIN — FENTANYL CITRATE 25 MCG: 50 INJECTION INTRAMUSCULAR; INTRAVENOUS at 16:14

## 2023-03-16 RX ADMIN — FENTANYL CITRATE 25 MCG: 50 INJECTION INTRAMUSCULAR; INTRAVENOUS at 16:48

## 2023-03-16 RX ADMIN — HEPARIN SODIUM 5000 UNITS: 5000 INJECTION INTRAVENOUS; SUBCUTANEOUS at 05:37

## 2023-03-16 RX ADMIN — ACETAMINOPHEN 975 MG: 325 TABLET ORAL at 20:44

## 2023-03-16 RX ADMIN — HYDROMORPHONE HYDROCHLORIDE 0.5 MG: 1 INJECTION, SOLUTION INTRAMUSCULAR; INTRAVENOUS; SUBCUTANEOUS at 19:23

## 2023-03-16 RX ADMIN — OXYCODONE HYDROCHLORIDE 10 MG: 10 TABLET ORAL at 01:47

## 2023-03-16 RX ADMIN — HYDROMORPHONE HYDROCHLORIDE 0.5 MG: 1 INJECTION, SOLUTION INTRAMUSCULAR; INTRAVENOUS; SUBCUTANEOUS at 15:04

## 2023-03-16 RX ADMIN — FENTANYL CITRATE 25 MCG: 50 INJECTION INTRAMUSCULAR; INTRAVENOUS at 16:36

## 2023-03-16 RX ADMIN — SODIUM CHLORIDE 0.15 MCG/KG/MIN: 900 INJECTION INTRAVENOUS at 12:34

## 2023-03-16 RX ADMIN — OXYCODONE HYDROCHLORIDE 10 MG: 10 TABLET ORAL at 17:42

## 2023-03-16 RX ADMIN — SODIUM CHLORIDE 100 ML/HR: 0.9 INJECTION, SOLUTION INTRAVENOUS at 16:44

## 2023-03-16 RX ADMIN — DEXAMETHASONE SODIUM PHOSPHATE 10 MG: 10 INJECTION, SOLUTION INTRAMUSCULAR; INTRAVENOUS at 12:31

## 2023-03-16 RX ADMIN — SODIUM CHLORIDE 75 ML/HR: 0.9 INJECTION, SOLUTION INTRAVENOUS at 17:49

## 2023-03-16 RX ADMIN — PROPOFOL 100 MCG/KG/MIN: 10 INJECTION, EMULSION INTRAVENOUS at 12:34

## 2023-03-16 RX ADMIN — HYDROMORPHONE HYDROCHLORIDE 0.5 MG: 1 INJECTION, SOLUTION INTRAMUSCULAR; INTRAVENOUS; SUBCUTANEOUS at 20:56

## 2023-03-16 RX ADMIN — BACITRACIN 3 LARGE APPLICATION: 500 OINTMENT TOPICAL at 08:14

## 2023-03-16 RX ADMIN — ONDANSETRON 4 MG: 2 INJECTION INTRAMUSCULAR; INTRAVENOUS at 15:10

## 2023-03-16 RX ADMIN — SENNOSIDES 8.6 MG: 8.6 TABLET, FILM COATED ORAL at 22:16

## 2023-03-16 RX ADMIN — CHLORHEXIDINE GLUCONATE 15 ML: 1.2 SOLUTION ORAL at 11:02

## 2023-03-16 RX ADMIN — OXYCODONE HYDROCHLORIDE 10 MG: 10 TABLET ORAL at 08:15

## 2023-03-16 RX ADMIN — DOCUSATE SODIUM 100 MG: 100 CAPSULE, LIQUID FILLED ORAL at 17:48

## 2023-03-16 RX ADMIN — MIDAZOLAM 2 MG: 1 INJECTION INTRAMUSCULAR; INTRAVENOUS at 12:21

## 2023-03-16 RX ADMIN — LIDOCAINE HYDROCHLORIDE 50 MG: 10 INJECTION, SOLUTION EPIDURAL; INFILTRATION; INTRACAUDAL; PERINEURAL at 12:30

## 2023-03-16 RX ADMIN — PHENYLEPHRINE HYDROCHLORIDE 50 MCG/MIN: 10 INJECTION INTRAVENOUS at 12:43

## 2023-03-16 RX ADMIN — METOROPROLOL TARTRATE 5 MG: 5 INJECTION, SOLUTION INTRAVENOUS at 20:57

## 2023-03-16 RX ADMIN — FENTANYL CITRATE 100 MCG: 50 INJECTION INTRAMUSCULAR; INTRAVENOUS at 12:30

## 2023-03-16 RX ADMIN — SODIUM CHLORIDE: 0.9 INJECTION, SOLUTION INTRAVENOUS at 13:00

## 2023-03-16 RX ADMIN — BACITRACIN 3 LARGE APPLICATION: 500 OINTMENT TOPICAL at 17:42

## 2023-03-16 RX ADMIN — GABAPENTIN 300 MG: 300 CAPSULE ORAL at 19:23

## 2023-03-16 RX ADMIN — OXYCODONE HYDROCHLORIDE 10 MG: 10 TABLET ORAL at 22:21

## 2023-03-16 RX ADMIN — CEFAZOLIN 2000 MG: 1 INJECTION, POWDER, FOR SOLUTION INTRAMUSCULAR; INTRAVENOUS at 13:01

## 2023-03-16 RX ADMIN — SODIUM CHLORIDE 75 ML/HR: 0.9 INJECTION, SOLUTION INTRAVENOUS at 00:19

## 2023-03-16 RX ADMIN — Medication 100 MG: at 12:31

## 2023-03-16 RX ADMIN — PROPOFOL 150 MCG/KG/MIN: 10 INJECTION, EMULSION INTRAVENOUS at 13:47

## 2023-03-16 RX ADMIN — METHOCARBAMOL 750 MG: 750 TABLET ORAL at 23:00

## 2023-03-16 RX ADMIN — HYDROMORPHONE HYDROCHLORIDE 0.5 MG: 1 INJECTION, SOLUTION INTRAMUSCULAR; INTRAVENOUS; SUBCUTANEOUS at 03:32

## 2023-03-16 RX ADMIN — SODIUM CHLORIDE 0.15 MCG/KG/MIN: 900 INJECTION INTRAVENOUS at 15:00

## 2023-03-16 RX ADMIN — SODIUM CHLORIDE, SODIUM LACTATE, POTASSIUM CHLORIDE, CALCIUM CHLORIDE: 600; 310; 30; 20 INJECTION, SOLUTION INTRAVENOUS at 14:27

## 2023-03-16 NOTE — UTILIZATION REVIEW
Initial Clinical Review    Admission: Date/Time/Statement:   Admission Orders (From admission, onward)     Ordered        03/15/23 2000  Inpatient Admission  Once                      Orders Placed This Encounter   Procedures   • Inpatient Admission     Standing Status:   Standing     Number of Occurrences:   1     Order Specific Question:   Level of Care     Answer:   Med Surg [16]     Order Specific Question:   Bed Type     Answer:   Trauma [7]     Order Specific Question:   Estimated length of stay     Answer:   More than 2 Midnights     Order Specific Question:   Certification     Answer:   I certify that inpatient services are medically necessary for this patient for a duration of greater than two midnights  See H&P and MD Progress Notes for additional information about the patient's course of treatment  ED Arrival Information     Expected   3/15/2023     Arrival   3/15/2023 18:43    Acuity   Urgent            Means of arrival   Ambulance    Escorted by   Unknown    Service   Neurosurgery    Admission type   Emergency            Arrival complaint   l1 fracture           Chief Complaint   Patient presents with   • Trauma     See trauma doc       Initial Presentation: 58 y o  male, transfer from [address] [place] [address] ED presents for L1 burst fracture  Pt was working at a construction site, installing the floor, 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 through the beams into the basement below  He suffered several scrapes while falling to the floor, fell about 12 feet, landed on his heels and then lower back  Denies LOC, denies head strike  He did suffer immediate evacuation of his bowels upon landing on the floor, promptly got to his feet under his own power, ambulated to his car, drove home and showered  At [address] [place] [address] ED, noted with L1 burst fractue and urinary retention  Straight cath for 700 ml  Usually voids without difficulty  Transferred for Trauma services  Admit Inpatient level of care for Fall with L1 burst fracture and Urinary retention  Neuro checks q4h  Urinary retention workup and treat  Neurosurgery consult  Strict bedrest and spinal precautions until cleared by Neurosurgery  3/15  Telemed Neurosurgery; Recommend MRI lumbar spine  No emergent Neurosurgical intervention tonight  Continue spine precautions with bedrest and TLSO brace for now  Pain control  CT CAP showed L1 burst fracture with ~50% loss in vertebral body height, mild retropulsion of posterior vertebral wall, fracture extending to right pedicle, lamina, and inferior facet  Date: 3/16   Day 2:   Quick Note @ 1340 pm; Light touch sensation to BLE, both feet, and throughout perineum  Intact/normal bilateral plantar/dorxi-flexion, but now with 4/5 strength in L hip flexion, changed from 5/5 prior  R hip flexion still 5/5  With moderate downward pressure, patient can lift his R thigh against my resistance but not the left  Neurosurgery cons; Fall from 10 ft with Traumatic L1 burst fracture TLICS-4  Upright lumbar spine x-rays in brace pending  Neuro checks  Plan OR today for T10-T12 posterior decompression today  NPO  No AC/AP  Pain control  CT chest abdomen and pelvis without contrast on 3/15/2023 demonstrate L1 burst fracture and right L1 transverse process fracture  Moderate narrowing of the central canal   MRI of lumbar spine on 3/15/2023 demonstrates stable L1 burst fracture  Moderate central canal stenosis at L1: And crowding of the cauda equina  Interspinous ligamentous injury T12-L1  Mild degenerative changes multiple lumbar spine levels  Progress notes; Fell 10-12 ft  Continue spinal precautions  Continue Neuro check q1h  Plan for OR today  Follow for Urinary retention  Pain control       3/16 OR - S/p FUSION LUMBAR/THORACIC POSTERIOR PERCUTANEOUS SCREW FIXATION T11 -L3(NAVIGATED)   Operative Indications:  L1 unstable burst fracture  Operative Findings:  Navigated percutaneous screws placed in T11-L3 bilaterally, deliberately skipping the fractured L1 level    ED Triage Vitals   Temperature Pulse Respirations Blood Pressure SpO2   03/15/23 1900 03/15/23 1900 03/15/23 1900 03/15/23 1900 03/15/23 1900   98 1 °F (36 7 °C) 96 17 156/96 95 %      Temp Source Heart Rate Source Patient Position - Orthostatic VS BP Location FiO2 (%)   03/15/23 1900 03/15/23 1900 03/15/23 1900 -- --   Oral Monitor Lying        Pain Score       03/15/23 1925       2          Wt Readings from Last 1 Encounters:   02/20/23 86 6 kg (191 lb)     Additional Vital Signs:   03/16/23 1121 98 °F (36 7 °C) -- -- -- -- -- -- --   03/16/23 10:08:04 98 3 °F (36 8 °C) 77 16 138/87 104 95 % -- --   03/16/23 06:31:55 98 1 °F (36 7 °C) 86 18 138/87 104 91 % -- --   03/16/23 00:23:09 98 6 °F (37 °C) 85 18 138/90 106 94 % None (Room air) --   03/15/23 21:32:40 99 3 °F (37 4 °C) 89 16 136/91 106 92 % None (Room air) --   03/15/23 2130 -- -- -- -- -- -- None (Room air)      Pertinent Labs/Diagnostic Test Results:   MRI lumbar spine wo contrast   Final Result by Esau Singh MD (03/15 2341)         1  Stable L1 burst fracture  Moderate central canal stenosis at the L1 level and crowding of the cauda equina  2   Interspinous ligamentous injury at T12-L1  3  Disc and facet degenerative change throughout the mid and lower lumbar spine resulting in multilevel nerve root encroachment           Workstation performed: [id number]         XR spine lumbar 2 or 3 views injury (3/16)   (Results Pending)         Results from last 7 days   Lab Units 03/16/23  0528 03/15/23  1559   WBC Thousand/uL 7 73 7 52   HEMOGLOBIN g/dL 14 1 15 3   HEMATOCRIT % 42 1 44 5   PLATELETS Thousands/uL 144* 160   NEUTROS ABS Thousands/µL 6 09 6 28         Results from last 7 days   Lab Units 03/16/23  0528 03/15/23  1559   SODIUM mmol/L 139 139   POTASSIUM mmol/L 3 6 4 0   CHLORIDE mmol/L 108 103   CO2 mmol/L 27 28   ANION GAP mmol/L 4 8   BUN mg/dL 18 20   CREATININE mg/dL 0 73 0 86   EGFR ml/min/1 73sq m 99 92   CALCIUM mg/dL 8 6 9  4   CALCIUM, IONIZED mmol/L 1 08*  --    MAGNESIUM mg/dL 2 3  --    PHOSPHORUS mg/dL 4 1  --      Results from last 7 days   Lab Units 03/15/23  1559   AST U/L 39   ALT U/L 33   ALK PHOS U/L 54   TOTAL PROTEIN g/dL 7 3   ALBUMIN g/dL 4 6   TOTAL BILIRUBIN mg/dL 0 58         Results from last 7 days   Lab Units 03/16/23  0528 03/15/23  1559   GLUCOSE RANDOM mg/dL 119 123         Results from last 7 days   Lab Units 03/15/23  2019   PROTIME seconds 13 4   INR  1 00   PTT seconds 22*       ED Treatment:   Medication Administration from 03/15/2023 1654 to 03/15/2023 2128       Date/Time Order Dose Route Action     03/15/2023 2028 EDT enoxaparin (LOVENOX) subcutaneous injection 30 mg 30 mg Subcutaneous Given     03/15/2023 2027 EDT tetanus-diphtheria-acellular pertussis (BOOSTRIX) IM injection 0 5 mL 0 5 mL Intramuscular Given     03/15/2023 2022 EDT bacitracin topical ointment 3 large application 3 large application Topical Given by Other        Past Medical History:   Diagnosis Date   • Known health problems: none      Present on Admission:  **None**      Admitting Diagnosis: Fracture [T14  8XXA]  Closed burst fracture of lumbar vertebra, initial encounter ([facility] ) [S32 001A]  Age/Sex: 58 y o  male     Admission Orders:  Scheduled Medications:  bacitracin topical ointment, 3 application  , Topical, BID      Continuous IV Infusions:  sodium chloride, 100 mL/hr, Intravenous, Continuous      PRN Meds:  acetaminophen, 975 mg, Oral, Q6H PRN 3/16 x1  HYDROmorphone, 0 5 mg, Intravenous, Q3H PRN 3/15 x1, 3/16 x1  oxyCODONE, 10 mg, Oral, Q4H PRN 3/15 x1, 3/16 x2  oxyCODONE, 5 mg, Oral, Q4H PRN      Neuro checks q1h  Spinal precautions  Lumbar spine precautions  IP CONSULT TO NEUROSURGERY    Network Utilization Review Department  ATTENTION: Please call with any questions or concerns to [phone] and carefully listen to the prompts so that you are directed to the right person   All voicemails are confidential   Naveen Moore all requests for admission clinical reviews, approved or denied determinations and any other requests to dedicated fax number below belonging to the campus where the patient is receiving treatment   List of dedicated fax numbers for the Facilities:  [address] [place] [address] DENIALS (Administrative/Medical Necessity) [phone]   [address] (Maternity/NICU/Pediatrics) [phone]   [address] [phone]   [facility] [address] [phone]   [address] [place] [address] [phone]   [address] [facility] [address] [facility] [phone]

## 2023-03-16 NOTE — CONSULTS
Consultation - Neurosurgery   Narcisa Body  58 y o  male MRN: [id number]  Unit/Bed#: [facility] 610-01 Encounter: [id number]    Images reviewed at morning rounds on 3/16/2023 at 7 AM  Patient was seen and examined on 3/ 16 /2023 at 9:20 AM    Inpatient consult to Neurosurgery  Consult performed by: Yael Mena PA-C  Consult ordered by: Tanner Magana MD          Assessment/Plan               Assessment:  1  Traumatic L1 burst fracture TLICS-4  2  Fall-10 FT      Plan:   · Exam: A&OX3, Millie  Finger to nsoe test normala nd without drift bilaterally  Strength 5/5 bilaterally, sensation to LT intact throughout  DTR 2+, no clonus bilaterally  Tenderness in the lower Lumbar and TL region on palpation  · Imaging is reviewed personally and by attendings  Findings as follows:  · CT chest abdomen and pelvis without contrast on 3/15/2023 demonstrate L1 burst fracture and right L1 transverse process fracture  Moderate narrowing of the central canal   · MRI of lumbar spine on 3/15/2023 demonstrates stable L1 burst fracture  Moderate central canal stenosis at L1: And crowding of the cauda equina  Interspinous ligamentous injury T12-L1  Mild degenerative changes multiple lumbar spine levels  · Upright lumbar spine x-rays in brace ordered-pending    · Pain control: Recommend multimodal pain meds  · DVT ppx: SCDs bilateral legs, no AC/AP or pharm DVT ppx  · Activity: As tolerated  · PT/OT evaluation & treatment  · Brace: Wear TLSO when assuming upright and at 45 degree head of bed  · Medical Mx: Per primary team  · Neurocheck: Routine  · Patient with traumatic L1, TLICS-4 # following fall from 10 ft roof, severe lower back pain, urinary retention requiring straight cath 2x, no radicular symptoms, bowel issue or saddle anaesthesia  Exam non focal except tenderness at TL and LS region  Plan: to have T10-L2 posterior decompression with F/F by Dr Angie William today  Patient is stable to have the procedure   Reviewed the images with the patient, discussed some of the risks, benefits and alternatives to the procedure  Patient wants to discuss with his wife about the plan prior to consent and have the procedure  He also wants the neurosurgeon to go over the procedure with him   Pre-op order put in  He is NPO  No AC/AP and no Pharm DVT ppx, Darra Moment his RN is informed and aware  Call with questions or concerns  History of Present Illness     HPI: Huy Swenson  is a 58 y o  male with insignificant past medical history admitted with acute  traumatic burst L1 compression fracture following a fall from 10 feet roof  After the fall patient got up and walked took shower  However, his pain get worse over time and within 30 to 45 minutes after the incident he came to the emergency room  He complains  Severe lower back pain, estimated his intensity of the pain as 10/10  Has also  urinary retention requiring straight catheterization  Patient denies any radicular pain in the lower extremities, numbness, weakness or paresthesia  Denies any saddle anesthesia or bowel dysfunction  Denies hitting his head, loss consciousness or seizures  Denies history of, chills, rigors, cough or chest pain  Patent denies history of diabetes mellitus, hypertension, congestive heart failure, stroke, bleeding disorder or taking anticoagulant medications  History of smoking cigarettes but drinks alcohol socially  Findings as described in the assessment section above  Review of Systems   Constitutional: Negative for activity change, appetite change, chills, fatigue, fever and unexpected weight change  HENT: Negative for trouble swallowing and voice change  Eyes: Negative for photophobia and visual disturbance  Respiratory: Negative for apnea, cough, chest tightness, shortness of breath and wheezing  Cardiovascular: Negative for chest pain, palpitations and leg swelling  Gastrointestinal: Negative for nausea and vomiting     Genitourinary: Positive for difficulty urinating  Musculoskeletal: Positive for back pain  Negative for gait problem and neck pain  Neurological: Negative for dizziness, tremors, seizures, syncope, facial asymmetry, speech difficulty, weakness, light-headedness, numbness and headaches  Psychiatric/Behavioral: Negative for confusion  Historical Information   Past Medical History:   Diagnosis Date   • Known health problems: none      Past Surgical History:   Procedure Laterality Date   • ROTATOR CUFF REPAIR Right    • SHOULDER SURGERY       Social History     Substance and Sexual Activity   Alcohol Use Yes    Comment: social     Social History     Substance and Sexual Activity   Drug Use Never     Social History     Tobacco Use   Smoking Status Never   Smokeless Tobacco Never     Family History   Problem Relation Age of Onset   • Breast cancer Mother    • Heart disease Father    • No Known Problems Sister    • No Known Problems Brother        Meds/Allergies   all current active meds have been reviewed, current meds:   Current Facility-Administered Medications   Medication Dose Route Frequency   • acetaminophen (TYLENOL) tablet 975 mg  975 mg Oral Q6H PRN   • bacitracin topical ointment 3 large application  3 application  Topical BID   • HYDROmorphone (DILAUDID) injection 0 5 mg  0 5 mg Intravenous Q3H PRN   • oxyCODONE (ROXICODONE) immediate release tablet 10 mg  10 mg Oral Q4H PRN   • oxyCODONE (ROXICODONE) IR tablet 5 mg  5 mg Oral Q4H PRN   • sodium chloride 0 9 % infusion  100 mL/hr Intravenous Continuous    and PTA meds:   None     No Known Allergies    Objective   I/O       03/14 0701  03/15 0700 03/15 0701  03/16 0700 03/16 0701  03/17 0700    P  O    0    I V   482 5 135    Total Intake  482 5 135    Urine  1450     Total Output  1450     Net  -967 5 +135                 Physical Exam  Constitutional:       Appearance: Normal appearance  HENT:      Head: Normocephalic and atraumatic     Eyes:      Extraocular Movements: Extraocular movements intact  Cardiovascular:      Rate and Rhythm: Normal rate and regular rhythm  Pulses: Normal pulses  Pulmonary:      Effort: Pulmonary effort is normal    Musculoskeletal:         General: Tenderness present  Cervical back: Normal range of motion  Neurological:      General: No focal deficit present  Mental Status: He is alert and oriented to person, place, and time  GCS: GCS eye subscore is 4  GCS verbal subscore is 5  GCS motor subscore is 6  Cranial Nerves: Cranial nerves 2-12 are intact  Sensory: Sensation is intact  Motor: Motor function is intact  Coordination: Finger-Nose-Finger Test normal       Deep Tendon Reflexes: Reflexes are normal and symmetric  Reflex Scores:       Tricep reflexes are 2+ on the right side and 2+ on the left side  Bicep reflexes are 2+ on the right side and 2+ on the left side  Brachioradialis reflexes are 2+ on the right side and 2+ on the left side  Patellar reflexes are 2+ on the right side and 2+ on the left side  Achilles reflexes are 2+ on the right side and 2+ on the left side  Psychiatric:         Speech: Speech normal        Neurologic Exam     Mental Status   Oriented to person, place, and time  Speech: speech is normal   Level of consciousness: alert    Cranial Nerves   Cranial nerves II through XII intact       CN III, IV, VI   Nystagmus: none     CN XI   CN XI normal      Motor Exam   Muscle bulk: normal  Overall muscle tone: normal  Right arm tone: normal  Left arm tone: normal  Right arm pronator drift: absent  Left arm pronator drift: absent  Right leg tone: normal  Left leg tone: normal    Sensory Exam   Light touch normal      Gait, Coordination, and Reflexes     Coordination   Finger to nose coordination: normal    Reflexes   Right brachioradialis: 2+  Left brachioradialis: 2+  Right biceps: 2+  Left biceps: 2+  Right triceps: 2+  Left triceps: 2+  Right patellar: 2+  Left patellar: 2+  Right achilles: 2+  Left achilles: 2+  Right : 2+  Left : 2+  Right Davey: absent  Left Davey: absent  Right ankle clonus: absent  Left pendular knee jerk: absent      Vitals:Blood pressure 138/87, pulse 86, temperature 98 1 °F (36 7 °C), resp  rate 18, SpO2 91 %  ,There is no height or weight on file to calculate BMI  Lab Results:   Results from last 7 days   Lab Units 03/16/23  0528 03/15/23  1559   WBC Thousand/uL 7 73 7 52   HEMOGLOBIN g/dL 14 1 15 3   HEMATOCRIT % 42 1 44 5   PLATELETS Thousands/uL 144* 160   NEUTROS PCT % 80* 84*   MONOS PCT % 8 6     Results from last 7 days   Lab Units 03/16/23  0528 03/15/23  1559   POTASSIUM mmol/L 3 6 4 0   CHLORIDE mmol/L 108 103   CO2 mmol/L 27 28   BUN mg/dL 18 20   CREATININE mg/dL 0 73 0 86   CALCIUM mg/dL 8 6 9 4   ALK PHOS U/L  --  54   ALT U/L  --  33   AST U/L  --  39     Results from last 7 days   Lab Units 03/16/23  0528   MAGNESIUM mg/dL 2 3     Results from last 7 days   Lab Units 03/16/23  0528   PHOSPHORUS mg/dL 4 1     Results from last 7 days   Lab Units 03/15/23  2019   INR  1 00   PTT seconds 22*     No results found for: TROPONINT  ABG:No results found for: PHART, PRQ2OEH, PO2ART, FTO5ESW, X2JPVIWR, BEART, SOURCE    Imaging Studies: I have personally reviewed pertinent reports   , I have personally reviewed pertinent films in PACS and I have personally reviewed pertinent films in PACS with a Radiologist     EKG, Pathology, and Other Studies: I have personally reviewed pertinent reports   , I have personally reviewed pertinent films in PACS and I have personally reviewed pertinent films in PACS with a Radiologist     VTE Prophylaxis: Sequential compression device (Venodyne)     Code Status: Level 1 - Full Code  Advance Directive and Living Will:      Power of :    POLST:      Counseling / Coordination of Care  I spent 20 minutes with the patient

## 2023-03-16 NOTE — QUICK NOTE
Re-examined patient at bedside  Light touch sensation to BLE, both feet, and throughout perineum  Intact/normal bilateral plantar/dorxi-flexion, but now with 4/5 strength in L hip flexion, changed from 5/5 prior  R hip flexion still 5/5  With moderate downward pressure, patient can lift his R thigh against my resistance but not the left  Discussed his current exam as well as MRI read of 'crowding of the cauda equina' with Dr Charlette Bañuelos and on-call neurosurgeon  Upgrade to SD2 level of care for Q1 hour neuro checks but no other overnight action required at this time

## 2023-03-16 NOTE — PLAN OF CARE
Problem: MOBILITY - ADULT  Goal: Maintain or return to baseline ADL function  Description: INTERVENTIONS:  -  Assess patient's ability to carry out ADLs; assess patient's baseline for ADL function and identify physical deficits which impact ability to perform ADLs (bathing, care of mouth/teeth, toileting, grooming, dressing, etc )  - Assess/evaluate cause of self-care deficits   - Assess range of motion  - Assess patient's mobility; develop plan if impaired  - Assess patient's need for assistive devices and provide as appropriate  - Encourage maximum independence but intervene and supervise when necessary  - Involve family in performance of ADLs  - Assess for home care needs following discharge   - Consider OT consult to assist with ADL evaluation and planning for discharge  - Provide patient education as appropriate  Outcome: Progressing  Goal: Maintains/Returns to pre admission functional level  Description: INTERVENTIONS:  - Perform BMAT or MOVE assessment daily    - Set and communicate daily mobility goal to care team and patient/family/caregiver  - Collaborate with rehabilitation services on mobility goals if consulted  - Perform Range of Motion  times a day  - Reposition patient every  hours    - Dangle patient  times a day  - Stand patient  times a day  - Ambulate patient  times a day  - Out of bed to chair  times a day   - Out of bed for meals  times a day  - Out of bed for toileting  - Record patient progress and toleration of activity level   Outcome: Progressing     Problem: Prexisting or High Potential for Compromised Skin Integrity  Goal: Skin integrity is maintained or improved  Description: INTERVENTIONS:  - Identify patients at risk for skin breakdown  - Assess and monitor skin integrity  - Assess and monitor nutrition and hydration status  - Monitor labs   - Assess for incontinence   - Turn and reposition patient  - Assist with mobility/ambulation  - Relieve pressure over bony prominences  - Avoid friction and shearing  - Provide appropriate hygiene as needed including keeping skin clean and dry  - Evaluate need for skin moisturizer/barrier cream  - Collaborate with interdisciplinary team   - Patient/family teaching  - Consider wound care consult   Outcome: Progressing     Problem: NEUROSENSORY - ADULT  Goal: Achieves stable or improved neurological status  Description: INTERVENTIONS  - Monitor and report changes in neurological status  - Monitor vital signs such as temperature, blood pressure, glucose, and any other labs ordered   - Initiate measures to prevent increased intracranial pressure  - Monitor for seizure activity and implement precautions if appropriate      Outcome: Progressing  Goal: Remains free of injury related to seizures activity  Description: INTERVENTIONS  - Maintain airway, patient safety  and administer oxygen as ordered  - Monitor patient for seizure activity, document and report duration and description of seizure to physician/advanced practitioner  - If seizure occurs,  ensure patient safety during seizure  - Reorient patient post seizure  - Seizure pads on all 4 side rails  - Instruct patient/family to notify RN of any seizure activity including if an aura is experienced  - Instruct patient/family to call for assistance with activity based on nursing assessment  - Administer anti-seizure medications if ordered    Outcome: Progressing  Goal: Achieves maximal functionality and self care  Description: INTERVENTIONS  - Monitor swallowing and airway patency with patient fatigue and changes in neurological status  - Encourage and assist patient to increase activity and self care     - Encourage visually impaired, hearing impaired and aphasic patients to use assistive/communication devices  Outcome: Progressing     Problem: CARDIOVASCULAR - ADULT  Goal: Maintains optimal cardiac output and hemodynamic stability  Description: INTERVENTIONS:  - Monitor I/O, vital signs and rhythm  - Monitor for S/S and trends of decreased cardiac output  - Administer and titrate ordered vasoactive medications to optimize hemodynamic stability  - Assess quality of pulses, skin color and temperature  - Assess for signs of decreased coronary artery perfusion  - Instruct patient to report change in severity of symptoms  Outcome: Progressing     Problem: RESPIRATORY - ADULT  Goal: Achieves optimal ventilation and oxygenation  Description: INTERVENTIONS:  - Assess for changes in respiratory status  - Assess for changes in mentation and behavior  - Position to facilitate oxygenation and minimize respiratory effort  - Oxygen administered by appropriate delivery if ordered  - Initiate smoking cessation education as indicated  - Encourage broncho-pulmonary hygiene including cough, deep breathe, Incentive Spirometry  - Assess the need for suctioning and aspirate as needed  - Assess and instruct to report SOB or any respiratory difficulty  - Respiratory Therapy support as indicated  Outcome: Progressing     Problem: GASTROINTESTINAL - ADULT  Goal: Minimal or absence of nausea and/or vomiting  Description: INTERVENTIONS:  - Administer IV fluids if ordered to ensure adequate hydration  - Maintain NPO status until nausea and vomiting are resolved  - Nasogastric tube if ordered  - Administer ordered antiemetic medications as needed  - Provide nonpharmacologic comfort measures as appropriate  - Advance diet as tolerated, if ordered  - Consider nutrition services referral to assist patient with adequate nutrition and appropriate food choices  Outcome: Progressing  Goal: Maintains or returns to baseline bowel function  Description: INTERVENTIONS:  - Assess bowel function  - Encourage oral fluids to ensure adequate hydration  - Administer IV fluids if ordered to ensure adequate hydration  - Administer ordered medications as needed  - Encourage mobilization and activity  - Consider nutritional services referral to assist patient with adequate nutrition and appropriate food choices  Outcome: Progressing  Goal: Maintains adequate nutritional intake  Description: INTERVENTIONS:  - Monitor percentage of each meal consumed  - Identify factors contributing to decreased intake, treat as appropriate  - Assist with meals as needed  - Monitor I&O, weight, and lab values if indicated  - Obtain nutrition services referral as needed  Outcome: Progressing     Problem: GENITOURINARY - ADULT  Goal: Maintains or returns to baseline urinary function  Description: INTERVENTIONS:  - Assess urinary function  - Encourage oral fluids to ensure adequate hydration if ordered  - Administer IV fluids as ordered to ensure adequate hydration  - Administer ordered medications as needed  - Offer frequent toileting  - Follow urinary retention protocol if ordered  Outcome: Progressing  Goal: Absence of urinary retention  Description: INTERVENTIONS:  - Assess patient’s ability to void and empty bladder  - Monitor I/O  - Bladder scan as needed  - Discuss with physician/AP medications to alleviate retention as needed  - Discuss catheterization for long term situations as appropriate  Outcome: Progressing     Problem: SKIN/TISSUE INTEGRITY - ADULT  Goal: Skin Integrity remains intact(Skin Breakdown Prevention)  Description: Assess:  -Perform Leandro assessment every   -Clean and moisturize skin every   -Inspect skin when repositioning, toileting, and assisting with ADLS  -Assess under medical devices such as  every   -Assess extremities for adequate circulation and sensation     Bed Management:  -Have minimal linens on bed & keep smooth, unwrinkled  -Change linens as needed when moist or perspiring  -Avoid sitting or lying in one position for more than  hours while in bed  -Keep HOB at degrees     Toileting:  -Offer bedside commode  -Assess for incontinence every   -Use incontinent care products after each incontinent episode such as     Activity:  -Mobilize patient  times a day  -Encourage activity and walks on unit  -Encourage or provide ROM exercises   -Turn and reposition patient every  Hours  -Use appropriate equipment to lift or move patient in bed  -Instruct/ Assist with weight shifting every  when out of bed in chair  -Consider limitation of chair time  hour intervals    Skin Care:  -Avoid use of baby powder, tape, friction and shearing, hot water or constrictive clothing  -Relieve pressure over bony prominences using   -Do not massage red bony areas    Next Steps:  -Teach patient strategies to minimize risks such as    -Consider consults to  interdisciplinary teams such as  Outcome: Progressing  Goal: Incision(s), wounds(s) or drain site(s) healing without S/S of infection  Description: INTERVENTIONS  - Assess and document dressing, incision, wound bed, drain sites and surrounding tissue  - Provide patient and family education  - Perform skin care/dressing changes every   Outcome: Progressing

## 2023-03-16 NOTE — CASE MANAGEMENT
Case Management Assessment & Discharge Planning Note    Patient name Estelle Mercado  Location OR POOL/OR POOL MRN [id number]  : 1960 Date 3/16/2023       Current Admission Date: 3/15/2023  Current Admission Diagnosis:Closed unstable burst fracture of first lumbar vertebra [facility])   Patient Active Problem List    Diagnosis Date Noted   • Closed unstable burst fracture of first lumbar vertebra ([address] ) 2023   • Urinary retention 2023   • Acute midline low back pain without sciatica 2023   • DVT prophylaxis 2023   • Shoulder joint pain 2023   • Strain of supraspinatus muscle or tendon 2023   • Vitamin D deficiency 2013      LOS (days): 1  Geometric Mean LOS (GMLOS) (days):   Days to GMLOS:     OBJECTIVE:    Risk of Unplanned Readmission Score: 8 05         Current admission status: Inpatient       [facility]:   [facility] [address] [place] [address]  Phone: [phone] Fax: [phone]    Primary Care Provider: Ronda Phoenix, MD    Primary Insurance: BLUE CROSS  Secondary Insurance:     ASSESSMENT:  Active Health Care Proxies    There are no active Health Care Proxies on file         Advance Directives  Does patient have a [address]?: No  Was patient offered paperwork?: Yes  Does patient currently have a Health Care decision maker?: Yes, please see Health Care Proxy section  Does patient have Advance Directives?: No  Was patient offered paperwork?: Yes  Primary Contact: Nirav Goldman (Spouse) [phone]         Readmission Root Cause  30 Day Readmission: No    Patient Information  Admitted from[de-identified] Home  Mental Status: Alert  During Assessment patient was accompanied by: Not accompanied during assessment  Assessment information provided by[de-identified] Patient  Primary Caregiver: Self  Support Systems: Self, Spouse/significant other  [place] of Residence: [facility]  do you live in?: [address] entry access options   Select all that apply : Stairs  Number of steps to enter home : 3  Do the steps have railings?: Yes  Type of Current Residence: [place]  In the last 12 months, was there a time when you were not able to pay the mortgage or rent on time?: No  In the last 12 months, how many places have you lived?: 1  In the last 12 months, was there a time when you did not have a steady place to sleep or slept in a shelter (including now)?: No  Homeless/housing insecurity resource given?: N/A  Living Arrangements: Lives w/ Spouse/significant other  Is patient a ?: No    Activities of Daily Living Prior to Admission  Functional Status: Independent  Completes ADLs independently?: Yes  Ambulates independently?: Yes  Does patient use assisted devices?: No  Does patient currently own DME?: No  Does patient have a history of Outpatient Therapy (PT/OT)?: No  Does the patient have a history of Short-Term Rehab?: No  Does patient have a history of HHC?: No  Does patient currently have [facility]?: No    Patient Information Continued  Income Source: Employed  Does patient have prescription coverage?: Yes  Within the past 12 months, you worried that your food would run out before you got the money to buy more : Never true  Within the past 12 months, the food you bought just didn't last and you didn't have money to get more : Never true  Food insecurity resource given?: N/A  Does patient receive dialysis treatments?: No  Does patient have a history of substance abuse?: No  Does patient have a history of Mental Health Diagnosis?: No    Means of Transportation  Means of Transport to Appts[de-identified] Drives Self  In the past 12 months, has lack of transportation kept you from medical appointments or from getting medications?: No  In the past 12 months, has lack of transportation kept you from meetings, work, or from getting things needed for daily living?: No  Was application for public transport provided?: N/A    DISCHARGE DETAILS:    Discharge planning discussed with[de-identified] patient's wife  Freedom of Choice: Yes     CM contacted family/caregiver?: Yes  Were Treatment Team discharge recommendations reviewed with patient/caregiver?: Yes  Did patient/caregiver verbalize understanding of patient care needs?: Yes  Were patient/caregiver advised of the risks associated with not following Treatment Team discharge recommendations?: Yes    Contacts  Patient Contacts: Lisa Jara (Spouse) [phone]  Relationship to Patient[de-identified] Family  Contact Method: Phone  Phone Number: [phone]  Reason/Outcome: Continuity of Care, Emergency Contact, Discharge Planning      CM spoke to pt's wife to discuss the role of CM, as the pt is currently in the OR with [facility]    Pt lives with his wife in a 1 story home which has 2-3STE  Pt's bathroom is a tub/shower with standard toilet  Pt drives, works as a desk technician for [employer], and reports being fully independent for all ADL/iADLs  Pt's had 1 fall  Pt ambulates independent and owns no DME  Pt enjoys skiing, riding bikes, and doing construction projects  Pt uses [facility] in [place]  Pt doesn't have a living will or POA  Pt has no hx of mental health, substance abuse, IP rehab, or VNA  CM will appreciate therapy recommendations when appropriate  Pt has COVID vaccinations:  Dose 1 03/27/2021 (COVID-19 PFIZER VACCINE 0 3 ML IM) Dose 2 04/17/2021 (COVID-19 PFIZER VACCINE 0 3 ML IM)      CM reviewed d/c planning process including the following: identifying help at home, patient preference for d/c planning needs, Discharge ung, [facility], availability of treatment team to discuss questions or concerns patient and/or family may have regarding understanding medications and recognizing signs and symptoms once discharged  CM also encouraged patient to follow up with all recommended appointments after discharge   Patient advised of importance for patient and family to participate in managing patient’s medical well being

## 2023-03-16 NOTE — PROGRESS NOTES
[facility]  Progress Note - Vernestine Mole  1960, 58 y o  male MRN: [id number]  Unit/Bed#: OR POOL Encounter: [id number]  Primary Care Provider: Jacqueline Leonard MD   Date and time admitted to hospital: 3/15/2023  6:43 PM    DVT prophylaxis  Assessment & Plan  - lovenox  - SCDs    Acute midline low back pain without sciatica  Assessment & Plan  - multimodal pain regimen  - PRN tylenol, oxycodone, dilaudid    Urinary retention  Assessment & Plan  - no prior hx of urinary retention  - likely due to acute spinal cord compression  - insert desai catheter    Closed unstable burst fracture of first lumbar vertebra (HCC)  Assessment & Plan  - fall from 10-12 feet  - neurosurgery consult - plan for surgery today  - Q1 neuro checks        Bowel Regimen: senekot  VTE Prophylaxis:Enoxaparin (Lovenox)     Disposition: remain inpatient    Subjective   Chief Complaint: low back pain    Subjective: pt reports mild midline low back pain, improved overnight  Pain is dull and non-radiating  Also reports mild diffuse headache and neck stiffness  Objective   Vitals:   Temp:  [97 8 °F (36 6 °C)-99 3 °F (37 4 °C)] 98 °F (36 7 °C)  HR:  [77-98] 77  Resp:  [16-18] 16  BP: (136-176)/() 138/87    I/O       03/14 0701  03/15 0700 03/15 0701  03/16 0700 03/16 0701  03/17 0700    P  O    0    I V   482 5 135    Total Intake  482 5 135    Urine  1450     Total Output  1450     Net  -967 5 +135                  Physical Exam:   Physical Exam  General: well-appearing, no acute distress  Neuro: A&O x3, CN II-XII intact, no cerebellar dysfunction, motor and sensation intact throughout  CV: RRR, pulses 2+ and symmetric throughout  Pulm: CTA bilaterally, normal work of breathing  GI: abdomen soft, non-distended, non-tender, no signs of peritonitis  : deferred  MSK: ROM normal all joints, no tenderness in lower extremities  Skin: warm and dry  Psych: calm and cooperative    Invasive Devices Refill Approved    Rx renewed per Medication Renewal Policy. Medication was last renewed on 7/9/19.    Homa Marshall, [facility] Connection Triage/Med Refill 2/18/2020     Requested Prescriptions   Pending Prescriptions Disp Refills     rosuvastatin (CRESTOR) 20 MG tablet [Pharmacy Med Name: ROSUVASTATIN CALCIUM 20 MG TAB] 30 tablet 0     Sig: TAKE 1 TABLET BY MOUTH DAILY AT BEDTIME.       Statins Refill Protocol (Hmg CoA Reductase Inhibitors) Passed - 2/15/2020  2:15 PM        Passed - PCP or prescribing provider visit in past 12 months      Last office visit with prescriber/PCP: 11/8/2019 Coreen Kendall MD OR same dept: 1/17/2020 Frida Michelle MD OR same specialty: 1/17/2020 Frida Michelle MD  Last physical: 4/13/2018 Last MTM visit: Visit date not found   Next visit within 3 mo: Visit date not found  Next physical within 3 mo: Visit date not found  Prescriber OR PCP: Coreen Kendall MD  Last diagnosis associated with med order: 1. Hyperlipidemia, unspecified hyperlipidemia type  - rosuvastatin (CRESTOR) 20 MG tablet [Pharmacy Med Name: ROSUVASTATIN CALCIUM 20 MG TAB]; TAKE 1 TABLET BY MOUTH DAILY AT BEDTIME.  Dispense: 30 tablet; Refill: 0    If protocol passes may refill for 12 months if within 3 months of last provider visit (or a total of 15 months).                          Peripheral Intravenous Line  Duration           Peripheral IV Right -- days    Peripheral IV 03/15/23 Left Antecubital 1 day                      Lab Results: Results: I have personally reviewed all pertinent laboratory/tests results  Imaging: I have personally reviewed pertinent films in PACS   Other Studies:

## 2023-03-16 NOTE — ANESTHESIA POSTPROCEDURE EVALUATION
Post-Op Assessment Note    CV Status:  Stable  Pain Score: 0    Pain management: adequate     Mental Status:  Awake   Hydration Status:  Euvolemic   PONV Controlled:  Controlled   Airway Patency:  Patent      Post Op Vitals Reviewed: Yes      Staff: CRNA         No notable events documented      BP   145/93   Temp   98 5   Pulse  85   Resp   16   SpO2   98%

## 2023-03-16 NOTE — ED PROVIDER NOTES
Patient was seen and evaluated in junction with the [facility] practitioner  At bedside for evaluation and treatment  Reviewed both treatment details and and orders and evaluation for his trauma  For details of the patient's trauma or for to the practitioner's note  Throughout the hospital stay patient remained more dynamically stable  Significant traumatic findings included the burst fracture and right transverse process fracture of L1  [facility] significant traumatic injuries were noted  Pain was treated appropriately  Appropriate transfer to trauma center  Patient tolerated all procedures and medications well  Transferred in stable condition  Patient was constantly read evaluated throughout his hospital stay and following CAT scan evaluation       Aldo Park DO  03/16/23 0017

## 2023-03-16 NOTE — H&P
H&P Exam - Trauma   Mukul Edouard  58 y o  male MRN: [id number]  Unit/Bed#: ED 08 Encounter: [id number]    Assessment/Plan   Trauma Alert: Level B  Model of Arrival: Ambulance  Trauma Team: Attending Dr Leyda Martinez, Residents Isaac, and Fellow Dr Anuj Tang  Consultants: Neurosurgery, TT sent to Dr Brigida Jacobson at [phone] with immediate response  Trauma Active Problems:   L1 burst fracture  Urinary retention    Trauma Plan:  Admit to med surg under trauma  Q4 neuro checks  Urinary retention protocol for retention  Neurosurgery consult for L1 burst fracture   Strict bed rest and spinal precautions until cleared by NSG    Chief Complaint: Lower back pain    History of Present Illness   HPI:  Mukul Edouard  is a 58 y o  male who presents with L1 burst fracture  Patient was working at a construction site, installing the floor, 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 through the beams into the basement below  He suffered several scrapes while falling to the floor, fell about 12 feet, landed on his heels and then lower back  Denies loss of consciousness, denies head strike  He did suffer immediate evacuation of his bowels upon landing on the floor, promptly got to his feet under his own power, ambulated to his car, drove home and showered before coming to the hospital   Denies numbness and tingling, denies subjective border difficulty in the lower extremities  Denies saddle anesthesia  Transferred from [facility] upon diagnosis of L1 burst fracture  Patient has had urinary retention in the ER requiring straight cath for 700cc  Per patient he usually does not have difficulty voiding  Patient has no medical problems, takes no medications, drinks before bedtime sometimes (does not drink every day, last drink was 1 gin and orange juice at 10 AM this morning)  Endorses claustrophobia, mild, with a previous MRI years ago      Mechanism:Fall    Review of Systems   Constitutional: Negative for activity change, appetite change, chills and fever  HENT: Negative  Negative for congestion, dental problem, ear pain, facial swelling, nosebleeds, sore throat and trouble swallowing  Eyes: Negative  Negative for pain and visual disturbance  Respiratory: Negative  Negative for apnea and shortness of breath  Cardiovascular: Negative  Negative for chest pain and palpitations  Gastrointestinal: Negative for abdominal pain, anal bleeding, blood in stool, nausea and vomiting  Bowel incontinence x1 at time of injury, urinary retention since   Endocrine: Negative  Negative for cold intolerance and heat intolerance  Genitourinary: Negative  Negative for dysuria and hematuria  Musculoskeletal: Positive for back pain  Negative for gait problem and joint swelling  Skin: Negative  Negative for color change and wound  Allergic/Immunologic: Negative  Neurological: Negative  Negative for dizziness, seizures, light-headedness and numbness  Hematological: Negative  Psychiatric/Behavioral: Negative  Negative for behavioral problems and hallucinations  12-point, complete review of systems was reviewed and negative except as stated above  Historical Information   History is unobtainable from the patient due to none    Efforts to obtain history included the following sources: EMS, records, patient    Past Medical History:   Diagnosis Date   • Known health problems: none      Past Surgical History:   Procedure Laterality Date   • ROTATOR CUFF REPAIR Right    • SHOULDER SURGERY       Social History   Social History     Substance and Sexual Activity   Alcohol Use Yes    Comment: social     Social History     Substance and Sexual Activity   Drug Use Never     Social History     Tobacco Use   Smoking Status Never   Smokeless Tobacco Never     E-Cigarette/Vaping     E-Cigarette/Vaping Substances   • Nicotine No    • THC No    • CBD No    • Flavoring No    • Other No    • Unknown No      Immunization History   Administered Date(s) Administered   • COVID-19 PFIZER VACCINE 0 3 ML IM 03/27/2021, 04/17/2021   • INFLUENZA 10/16/2018   • Influenza Injectable, MDCK, Preservative Free, Quadrivalent 11/13/2019   • Influenza, recombinant, quadrivalent,injectable, preservative free 11/23/2022   • Tdap 11/23/2022     Last Tetanus: Ordered today  Family History: Non-contributory  Unable to obtain/limited by none      Meds/Allergies   all current active meds have been reviewed, current meds:   Current Facility-Administered Medications   Medication Dose Route Frequency   • acetaminophen (TYLENOL) tablet 975 mg  975 mg Oral Q6H PRN   • enoxaparin (LOVENOX) subcutaneous injection 30 mg  30 mg Subcutaneous Q12H   • HYDROmorphone (DILAUDID) injection 0 5 mg  0 5 mg Intravenous Q3H PRN   • oxyCODONE (ROXICODONE) immediate release tablet 10 mg  10 mg Oral Q4H PRN   • oxyCODONE (ROXICODONE) IR tablet 5 mg  5 mg Oral Q4H PRN   • tetanus-diphtheria-acellular pertussis (BOOSTRIX) IM injection 0 5 mL  0 5 mL Intramuscular Once   , and PTA meds:   None       No Known Allergies      PHYSICAL EXAM    PE limited by: None    Objective   Vitals:   First set: Temperature: 98 1 °F (36 7 °C) (03/15/23 1900)  Pulse: 96 (03/15/23 1900)  Respirations: 17 (03/15/23 1900)  Blood Pressure: 156/96 (03/15/23 1900)    Primary Survey:   (A) Airway: Intact  (B) Breathing: CTAB  (C) Circulation: Pulses:   carotid  2/4, pedal  2/4, radial  2/4  (D) Disabliity:  15  (E) Expose:  Completed    Secondary Survey: (Click on Physical Exam tab above)  Physical Exam  Constitutional:       General: He is not in acute distress  Appearance: Normal appearance  HENT:      Head: Normocephalic and atraumatic  Right Ear: External ear normal       Left Ear: External ear normal       Nose: Nose normal       Mouth/Throat:      Mouth: Mucous membranes are moist       Pharynx: Oropharynx is clear  Eyes:      General:         Right eye: No discharge  Left eye: No discharge  Extraocular Movements: Extraocular movements intact  Conjunctiva/sclera: Conjunctivae normal       Pupils: Pupils are equal, round, and reactive to light  Cardiovascular:      Rate and Rhythm: Normal rate  Pulses: Normal pulses  Heart sounds: Normal heart sounds  Pulmonary:      Effort: Pulmonary effort is normal  No respiratory distress  Abdominal:      General: Abdomen is flat  There is no distension  Palpations: Abdomen is soft  Tenderness: There is no abdominal tenderness  There is no guarding or rebound  Genitourinary:     Comments: OH performed with female chaperone (RN) in room  Normal rectal tone, appropriately increased rectal tone with valsalva, no palpable abnormalities, no blood on glove  No incontinence/leakage after withdrawal  Musculoskeletal:         General: No swelling, tenderness or signs of injury  Cervical back: Normal range of motion and neck supple  No rigidity or tenderness  Comments: No spinal tenderness (patient was carefully three-person log-rolled), no spinal stepoffs/deformities   Skin:     Coloration: Skin is not jaundiced  Findings: No lesion  Comments: Abrasions of L volar forearm, R volar forearm, R medial elbow, R dorsal pointer finger, RLQ abdomen  All hemostatic, irrigated with saline and cleaned with peroxide, generously applied bacitracin   Neurological:      General: No focal deficit present  Mental Status: He is alert and oriented to person, place, and time  Mental status is at baseline  Comments: Intact CN 2-12, normal  strength and adduction/abduction of BUE  Normal dorsi/plantar-flexion of BLE, normal hip flexion   Normal sensation to light touch all 4 extremities and perineum   Psychiatric:         Mood and Affect: Mood normal          Behavior: Behavior normal          Invasive Devices     Peripheral Intravenous Line  Duration           Peripheral IV Right -- days    Peripheral IV 03/15/23 Left Antecubital <1 day                Lab Results: Results: I have personally reviewed pertinent reports   , BMP/CMP:   Lab Results   Component Value Date    SODIUM 139 03/15/2023    K 4 0 03/15/2023     03/15/2023    CO2 28 03/15/2023    BUN 20 03/15/2023    CREATININE 0 86 03/15/2023    CALCIUM 9 4 03/15/2023    AST 39 03/15/2023    ALT 33 03/15/2023    ALKPHOS 54 03/15/2023    EGFR 92 03/15/2023   , CBC:   Lab Results   Component Value Date    WBC 7 52 03/15/2023    HGB 15 3 03/15/2023    HCT 44 5 03/15/2023    MCV 95 03/15/2023     03/15/2023    MCH 32 8 03/15/2023    MCHC 34 4 03/15/2023    RDW 12 4 03/15/2023    MPV 10 8 03/15/2023    NRBC 0 03/15/2023     Other Studies:     TRAUMA - CT head wo contrast    Result Date: 3/15/2023  Impression: No acute intracranial abnormality  Workstation performed: [id number]     TRAUMA - CT spine cervical wo contrast    Result Date: 3/15/2023  Impression: No acute cervical spine fracture or traumatic malalignment  Workstation performed: [id number]     TRAUMA - CT chest abdomen pelvis w contrast    Result Date: 3/15/2023  Impression: 1  L1 burst fracture and right L1 transverse process fracture  Moderate narrowing of the central canal  2   No evidence of acute trauma in the chest, abdomen or pelvis  I personally discussed this study with Carroll Mattson on 3/15/2023 at 4:37 PM  Workstation performed: [id number]     CT recon only thoracolumbar (no charge)    Result Date: 3/15/2023  Impression: 1  L1 burst fracture  At least moderate central canal stenosis  2   Right L1 transverse process fracture   Workstation performed: [id number]       Code Status: Level 1 - Full Code  Advance Directive and Living Will:      Power of :    POLST:

## 2023-03-16 NOTE — ASSESSMENT & PLAN NOTE
- no prior hx of urinary retention  - likely due to acute spinal cord compression  - insert desai catheter

## 2023-03-16 NOTE — OP NOTE
OPERATIVE REPORT  PATIENT NAME: Ale Amado  :  1960  MRN: [id number]  Pt Location:  OR ROOM 09    SURGERY DATE: 3/16/2023    Surgeon(s) and Role:     * Roland Platt MD - Primary     * Agata Dolan PA-C - Assisting    Preop Diagnosis:  L1 unstable burst fracture    Procedure(s):  FUSION LUMBAR/THORACIC POSTERIOR PERCUTANEOUS SCREW FIXATION T11 -L3(NAVIGATED)    Specimen(s):   None    Estimated Blood Loss:   30 mL    Drains:  Urethral Catheter Latex;Straight-tip 16 Fr  (Active)   Collection Container Standard drainage bag 23 1615   Number of days: 0       Anesthesia Type: general    Operative Indications:  L1 unstable burst fracture    Operative Findings:  Navigated percutaneous screws placed in T11-L3 bilaterally, deliberately skipping the fractured L1 level    Complications:   None    Procedure and Technique:  Findings: General endotracheal anesthesia was induced  Appropriate intravenous antibiotics were given  Serial compression devices were applied to the legs  Baseline intraoperative monitoring after turning the patient prone was achieved  Continuous sensory and intermittent motor monitoring was performed throughout the case  There was no significant change in monitoring throughout the case  The patient was placed prone on the OR table with all pressure points padded  The back was shaved and prepped and draped in the usual sterile fashion  Stealth frame was applied to a lower lumbar spinous process  O-arm spin was performed  Radiographic timeout was performed per protocol  Stealth navigation was used to insert a total of eight screws from the percutanous Voyager system from [place] into the pedicles of T11-L3 skipping L1 on both sides where the fracture was present  Incisions were localized over each level, on each side, using navigation and opened with a 10 blade    Navigated Midas drill and awl-tipped taps were used to drill through the pedicles into the vertebral bodies at each level  Left     Right  6 5X50 T11 6 5X50   6 5X50 T12 6 5X45   none L1 none   5 5X45 L2 5 5x45   6 5x 45 L3 6 5X45    4 75 X 160 jorge    4 75 X 160 jorge    All screws stimulated to over 20 with IOM  A second intraop O-arm spin confirmed good screw placement  160mm rods were inserted and secured in place using the locking screws  Jorge was visible in each tower over the screws  Ample jorge was seen and felt superior and inferior to the top and bottom screws on both sides  Though stability cannot be determined in the prone position, under general anesthestia, the purpose of inserting these screws and attaching them to these rods was, as clearly stated in my preop note, to stabilize the patient's spine  Hemostasis was obtained  Final tightening was achieved and the towers were removed from the screw heads in the usual fashion  The wounds were closed in layers of 0 Vicryl, 2-0 Vicryl, and 3-0 monocyrl at the skin and glue at the skin  The iliac reference frame was removed and that incision was also closed with monocryl and glue  Disposition: The patient tolerated the procedure well  The patient was extubated in the OR  The patient went to PACU  Condition: hemodynamically stable  Counts were correct for needles, sponges, and cottonoids       I was present for the entire procedure, A qualified resident physician was not available and A physician assistant was required during the procedure for retraction tissue handling,dissection and suturing    Patient Disposition:  PACU , hemodynamically stable and extubated and stable        SIGNATURE: Trace Sapp MD  DATE: March 16, 2023  TIME: 4:56 PM

## 2023-03-16 NOTE — ED NOTES
Report called to [facility] RN    All questions answered     Jackson Olivera RN  03/15/23 4426

## 2023-03-16 NOTE — ANESTHESIA PREPROCEDURE EVALUATION
Procedure:  FUSION LUMBAR/THORACIC POSTERIOR PERCUTANEOUS SCREW FIXATION (NAVIGATED) (Spine Thoracic)    Relevant Problems   MUSCULOSKELETAL   (+) Acute midline low back pain without sciatica        SRESS ECHO Interpretation Summary 11/ 2023       •  Left Ventricle: Left ventricular cavity size is normal  Wall thickness is normal  The left ventricular ejection fraction is 55% by visual estimation  Systolic function is normal  Wall motion is normal   •  Aortic Valve: The aortic valve velocity is normal   •  Mitral Valve: There is mild regurgitation  •  Tricuspid Valve: There is mild regurgitation  •  Stress ECG: No ST deviation is noted  The ECG was negative for ischemia  The stress ECG is negative for ischemia after maximal exercise, without reproduction of symptoms  •  Peak Stress Echo: Left ventricle cavity has normal reduction in size at peak stress  The left ventricle systolic function is hyperdynamic at peak stress  The peak stress echo showed normal wall motion which was hyperdynamic compared to baseline      Normal exercise stress echo after maximal exercise without reproduction of symptoms           Physical Exam    Airway    Mallampati score: III  TM Distance: >3 FB  Neck ROM: full     Dental       Cardiovascular  Cardiovascular exam normal    Pulmonary  Pulmonary exam normal     Other Findings        Anesthesia Plan  ASA Score- 3     Anesthesia Type- general with ASA Monitors  Additional Monitors:   Airway Plan: ETT  Plan Factors-Exercise tolerance (METS): >4 METS  Chart reviewed  EKG reviewed  Imaging results reviewed  Patient summary reviewed  Patient is not a current smoker  Induction- intravenous  Postoperative Plan- Plan for postoperative opioid use  Planned trial extubation    Informed Consent- Anesthetic plan and risks discussed with patient  I personally reviewed this patient with the CRNA  Discussed and agreed on the Anesthesia Plan with the CRNA  Gail KELLY: 58 y o  male presents s/p fall through floor       Arrived hemodynamically normal with GCS 15  Primary and secondary survey showed no evidence of life threatening injury or signs of shock  Neurologic exam normal, non-focal with equal strength  No saddle numbness       Injuries identified include: L1 burst fracture with TP fracture and canal stenosis; subsequent MRI shows same bony injury with crowding of the cauda equina         Assessment:  1  Traumatic L1 burst fracture TLICS-4  2  Fall-10 FT        Plan:   - Exam: A&OX3, Millie  Finger to nsoe test normala nd without drift bilaterally  Strength 5/5 bilaterally, sensation to LT intact throughout  DTR 2+, no clonus bilaterally  Tenderness in the lower Lumbar and TL region on palpation    - Imaging is reviewed personally and by attendings  Findings as follows:  - CT chest abdomen and pelvis without contrast on 3/15/2023 demonstrate L1 burst fracture and right L1 transverse process fracture  Moderate narrowing of the central canal   - MRI of lumbar spine on 3/15/2023 demonstrates stable L1 burst fracture  Moderate central canal stenosis at L1: And crowding of the cauda equina  Interspinous ligamentous injury T12-L1  Mild degenerative changes multiple lumbar spine levels  - Upright lumbar spine x-rays in brace ordered-pending     ? Pain control: Recommend multimodal pain meds  ? DVT ppx: SCDs bilateral legs, no AC/AP or pharm DVT ppx  ? Activity: As tolerated  ? PT/OT evaluation & treatment  ? Brace: Wear TLSO when assuming upright and at 45 degree head of bed  ? Medical Mx: Per primary team  ? Neurocheck: Routine  ? Patient with traumatic L1, TLICS-4 # following fall from 10 ft roof, severe lower back pain, urinary retention requiring straight cath 2x, no radicular symptoms, bowel issue or saddle anaesthesia  Exam non focal except tenderness at TL and LS region  Plan: to have T10-L2 posterior decompression with F/F by Dr Sarah Rahman today   Patient is stable to have the procedure  Reviewed the images with the patient, discussed some of the risks, benefits and alternatives to the procedure  Patient wants to discuss with his wife about the plan prior to consent and have the procedure  He also wants the neurosurgeon to go over the procedure with him   Pre-op order put in  He is NPO  No AC/AP and no Pharm DVT ppx, Martinez Lynn his RN is informed and aware  Call with questions or concerns

## 2023-03-17 ENCOUNTER — TELEPHONE (OUTPATIENT)
Dept: NEUROSURGERY | Facility: CLINIC | Age: 63
End: 2023-03-17

## 2023-03-17 ENCOUNTER — APPOINTMENT (INPATIENT)
Dept: RADIOLOGY | Facility: HOSPITAL | Age: 63
End: 2023-03-17

## 2023-03-17 PROBLEM — D69.6 THROMBOCYTOPENIA (HCC): Status: ACTIVE | Noted: 2023-03-17

## 2023-03-17 LAB
ANION GAP SERPL CALCULATED.3IONS-SCNC: 6 MMOL/L (ref 4–13)
BUN SERPL-MCNC: 16 MG/DL (ref 5–25)
CALCIUM SERPL-MCNC: 8.2 MG/DL (ref 8.3–10.1)
CHLORIDE SERPL-SCNC: 106 MMOL/L (ref 96–108)
CO2 SERPL-SCNC: 25 MMOL/L (ref 21–32)
CREAT SERPL-MCNC: 0.61 MG/DL (ref 0.6–1.3)
ERYTHROCYTE [DISTWIDTH] IN BLOOD BY AUTOMATED COUNT: 12.9 % (ref 11.6–15.1)
GFR SERPL CREATININE-BSD FRML MDRD: 107 ML/MIN/1.73SQ M
GLUCOSE SERPL-MCNC: 124 MG/DL (ref 65–140)
HCT VFR BLD AUTO: 37.8 % (ref 36.5–49.3)
HGB BLD-MCNC: 12.6 G/DL (ref 12–17)
MCH RBC QN AUTO: 32.2 PG (ref 26.8–34.3)
MCHC RBC AUTO-ENTMCNC: 33.3 G/DL (ref 31.4–37.4)
MCV RBC AUTO: 97 FL (ref 82–98)
PLATELET # BLD AUTO: 120 THOUSANDS/UL (ref 149–390)
PMV BLD AUTO: 11.8 FL (ref 8.9–12.7)
POTASSIUM SERPL-SCNC: 3.7 MMOL/L (ref 3.5–5.3)
RBC # BLD AUTO: 3.91 MILLION/UL (ref 3.88–5.62)
SODIUM SERPL-SCNC: 137 MMOL/L (ref 135–147)
WBC # BLD AUTO: 9.33 THOUSAND/UL (ref 4.31–10.16)

## 2023-03-17 RX ORDER — LABETALOL HYDROCHLORIDE 5 MG/ML
10 INJECTION, SOLUTION INTRAVENOUS EVERY 6 HOURS PRN
Status: DISCONTINUED | OUTPATIENT
Start: 2023-03-17 | End: 2023-03-21 | Stop reason: HOSPADM

## 2023-03-17 RX ORDER — ACETAMINOPHEN 325 MG/1
975 TABLET ORAL EVERY 8 HOURS SCHEDULED
Status: DISCONTINUED | OUTPATIENT
Start: 2023-03-17 | End: 2023-03-21 | Stop reason: HOSPADM

## 2023-03-17 RX ADMIN — DOCUSATE SODIUM 100 MG: 100 CAPSULE, LIQUID FILLED ORAL at 17:18

## 2023-03-17 RX ADMIN — CEFAZOLIN SODIUM 1000 MG: 1 SOLUTION INTRAVENOUS at 14:38

## 2023-03-17 RX ADMIN — OXYCODONE HYDROCHLORIDE 10 MG: 10 TABLET ORAL at 20:07

## 2023-03-17 RX ADMIN — BACITRACIN 3 LARGE APPLICATION: 500 OINTMENT TOPICAL at 17:18

## 2023-03-17 RX ADMIN — OXYCODONE HYDROCHLORIDE 10 MG: 10 TABLET ORAL at 11:58

## 2023-03-17 RX ADMIN — HYDROMORPHONE HYDROCHLORIDE 0.5 MG: 1 INJECTION, SOLUTION INTRAMUSCULAR; INTRAVENOUS; SUBCUTANEOUS at 21:15

## 2023-03-17 RX ADMIN — ACETAMINOPHEN 975 MG: 325 TABLET ORAL at 21:15

## 2023-03-17 RX ADMIN — METHOCARBAMOL 750 MG: 750 TABLET ORAL at 05:31

## 2023-03-17 RX ADMIN — ENOXAPARIN SODIUM 40 MG: 40 INJECTION SUBCUTANEOUS at 11:24

## 2023-03-17 RX ADMIN — CEFAZOLIN SODIUM 1000 MG: 1 SOLUTION INTRAVENOUS at 05:31

## 2023-03-17 RX ADMIN — GABAPENTIN 300 MG: 300 CAPSULE ORAL at 20:06

## 2023-03-17 RX ADMIN — ACETAMINOPHEN 975 MG: 325 TABLET ORAL at 13:52

## 2023-03-17 RX ADMIN — SENNOSIDES 8.6 MG: 8.6 TABLET, FILM COATED ORAL at 21:15

## 2023-03-17 RX ADMIN — GABAPENTIN 300 MG: 300 CAPSULE ORAL at 08:41

## 2023-03-17 RX ADMIN — OXYCODONE HYDROCHLORIDE 10 MG: 10 TABLET ORAL at 15:56

## 2023-03-17 RX ADMIN — BACITRACIN 3 LARGE APPLICATION: 500 OINTMENT TOPICAL at 08:42

## 2023-03-17 RX ADMIN — DOCUSATE SODIUM 100 MG: 100 CAPSULE, LIQUID FILLED ORAL at 08:41

## 2023-03-17 RX ADMIN — HYDROMORPHONE HYDROCHLORIDE 0.5 MG: 1 INJECTION, SOLUTION INTRAMUSCULAR; INTRAVENOUS; SUBCUTANEOUS at 08:39

## 2023-03-17 RX ADMIN — GABAPENTIN 300 MG: 300 CAPSULE ORAL at 15:56

## 2023-03-17 RX ADMIN — OXYCODONE HYDROCHLORIDE 10 MG: 10 TABLET ORAL at 07:59

## 2023-03-17 RX ADMIN — SODIUM CHLORIDE 75 ML/HR: 0.9 INJECTION, SOLUTION INTRAVENOUS at 05:32

## 2023-03-17 RX ADMIN — METOROPROLOL TARTRATE 5 MG: 5 INJECTION, SOLUTION INTRAVENOUS at 02:40

## 2023-03-17 RX ADMIN — LIDOCAINE 5% 2 PATCH: 700 PATCH TOPICAL at 08:41

## 2023-03-17 RX ADMIN — METHOCARBAMOL 750 MG: 750 TABLET ORAL at 17:18

## 2023-03-17 RX ADMIN — HYDROMORPHONE HYDROCHLORIDE 0.5 MG: 1 INJECTION, SOLUTION INTRAMUSCULAR; INTRAVENOUS; SUBCUTANEOUS at 13:52

## 2023-03-17 RX ADMIN — ACETAMINOPHEN 975 MG: 325 TABLET ORAL at 07:59

## 2023-03-17 RX ADMIN — METHOCARBAMOL 750 MG: 750 TABLET ORAL at 11:24

## 2023-03-17 NOTE — PLAN OF CARE
Prescription(s) e-prescribed to pharmacy.   Problem: PHYSICAL THERAPY ADULT  Goal: Performs mobility at highest level of function for planned discharge setting  See evaluation for individualized goals  Description: Treatment/Interventions: Functional transfer training, LE strengthening/ROM, Elevations, Therapeutic exercise, Endurance training, Patient/family training, Equipment eval/education, Bed mobility, Gait training, Spoke to nursing, OT  Equipment Recommended: Shekhar Castillo       See flowsheet documentation for full assessment, interventions and recommendations  Note: Prognosis: Good  Problem List: Decreased strength, Decreased endurance, Impaired balance, Decreased mobility, Orthopedic restrictions, Pain  Assessment: Pt presented to [facility] s/p L1 burst fracture and T11-L3 fusion POD1  No brace ordered per neurosurgery  PMH unremarkable  Pt being seen for high complexity PT evaluation due to ongoing medical management for primary diagnosis, continuous pulse oximetry monitoring, utilization of new assistive device, education on spinal precautions, and decreased activity tolerance compared to baseline  Pt reports living with spouse in a 1  with 3 YELENA  PTA pt was independent with all ADLs, IADLs, and functional mobility without AD  Pt states his wife works from home and is willing and able to help as needed  Provided pt education on spinal precautions during functional mobility and transfers  Pt performs bed mobility with Min Ax1, transfers with Min Ax1, ambulation with Min Ax1 and use of RW  Stairs not assessed at this time secondary to pt presentation  Amb distance limited by pain  At conclusion of PT evaluation, pt was seated in chair with all needs within reach  Pt presented at PT evaluation with decreased BLE strength and ROM, impaired static and dynamic balance, decreased endurance, pain, orthopedic restrictions, and gait deviations, and will continue to benefit from skilled PT services during hospital stay   Due to the impairments listed above, PT d/c recommendation when medically cleared is home with outpatient skilled PT services pending progress with gait and stair training and pain reduction  Barriers to Discharge: Inaccessible home environment  Barriers to Discharge Comments: Pt would benefit from further gait and stair training prior to d/c  PT Discharge Recommendation: Home with outpatient rehabilitation    See flowsheet documentation for full assessment

## 2023-03-17 NOTE — PROGRESS NOTES
[address] [facility]  Progress Note - Ange [birth year]  1960, 58 y o  male MRN: [id number]  Unit/Bed#: [facility] 610-01 Encounter: [id number]  Primary Care Provider: Kristin Naidu MD   Date and time admitted to hospital: 3/15/2023  6:43 PM    * Closed unstable burst fracture of first lumbar vertebra [facility])  Assessment & Plan  POD 1 fusion lumbar/thoracic posterior percutaneous screw fixation T11-L3  · Presented status post fall from approximately 10 feet from a roof, imaging demonstrated traumatic L1 burst fracture with mild superior retropulsion into canal   Preop patient had some urinary retention  Postop imaging pending    Plan:  · Continue neurological checks  · Postop x-rays pending  · Ordered TLSO brace to be worn when out of bed or head of bed greater than 45 degrees, patient will likely need to wear a brace for approximately 6 weeks  · No drain  · Gustafson catheter removed this morning, patient DTV  · Patient tolerating his diet  · Patient states his pain is well controlled on current regimen  Medical management and pain control per primary team  · Mobilize patient as tolerated  · PT/OT eval pending  · DVT PPX: SCDs and Lovenox per trauma    Neurosurgery will continue to follow, pending postop x-rays, call with any further questions or concerns  Thrombocytopenia ([address] )  Assessment & Plan  Platelets 961 this morning, spoke with Denise CORRIGAN with trauma team  Per trauma okay to continue Lovenox  Management per primary team    Subjective/Objective   Chief Complaint: post op check    Subjective: Patient states his pain was a lot worse last night  He is currently complaining of 3/10 low back pain and some pain groin pain, patient states he does not believe it is radiating from his back  He does state his current pain regimen does help with his pain  He also endorses some occasional headaches  He did have Gustafson catheter removed and is due to void this morning    He denies any dizziness, blurry vision, chest pain, shortness of breath, abdominal pain, nausea, vomiting, diarrhea, no problems with bowel, no new weakness or numbness/tingling  Patient states he has not had a bowel movement but is passing gas  Objective: Patient comfortably laying in bed, NAD  I/O       03/15 0701  03/16 0700 03/16 0701  03/17 0700 03/17 0701  03/18 0700    P  O   360     I V  482  5 3931 1311 3    Total Intake 482 5 4291 1311 3    Urine 1450 1575     Blood  30     Total Output 1450 1605     Net -967 5 +2686 +1311 3                 Invasive Devices     Peripheral Intravenous Line  Duration           Peripheral IV 03/16/23 Left Hand <1 day    Peripheral IV 03/16/23 Right Hand <1 day                Physical Exam:  Vitals: Blood pressure 136/73, pulse 77, temperature 98 3 °F (36 8 °C), resp  rate 18, SpO2 92 %  ,There is no height or weight on file to calculate BMI        General appearance: alert, appears stated age, cooperative and no distress  Head: Normocephalic, without obvious abnormality, atraumatic  Eyes: EOMI, PERRL, conjugate gaze  Neck: supple, symmetrical, trachea midline  Back: Posterior thoracolumbar incisions clean, dry, intact  Lungs: non labored breathing  Heart: regular heart rate  Neurologic:   Mental status: Alert, oriented x3, thought content appropriate, speech is clear, following commands  Cranial nerves: grossly intact (Cranial nerves II-XII)  Sensory: normal to touch in all extremities x4, JPS and DST intact  Motor: moving all extremities without focal weakness, strength 5/5 throughout  Reflexes: 2+ and symmetric, no Marino's or clonus appreciated  Coordination: finger to nose normal bilaterally, no drift bilaterally      Lab Results:  Results from last 7 days   Lab Units 03/17/23  0508 03/16/23  0528 03/15/23  1559   WBC Thousand/uL 9 33 7 73 7 52   HEMOGLOBIN g/dL 12 6 14 1 15 3   HEMATOCRIT % 37 8 42 1 44 5   PLATELETS Thousands/uL 120* 144* 160   NEUTROS PCT %  --  80* 84*   MONOS PCT %  --  8 6     Results from last 7 days   Lab Units 03/17/23  0508 03/16/23  0528 03/15/23  1559   POTASSIUM mmol/L 3 7 3 6 4 0   CHLORIDE mmol/L 106 108 103   CO2 mmol/L 25 27 28   BUN mg/dL 16 18 20   CREATININE mg/dL 0 61 0 73 0 86   CALCIUM mg/dL 8 2* 8 6 9 4   ALK PHOS U/L  --   --  54   ALT U/L  --   --  33   AST U/L  --   --  39     Results from last 7 days   Lab Units 03/16/23  0528   MAGNESIUM mg/dL 2 3     Results from last 7 days   Lab Units 03/16/23  0528   PHOSPHORUS mg/dL 4 1     Results from last 7 days   Lab Units 03/15/23  2019   INR  1 00   PTT seconds 22*     No results found for: TROPONINT  ABG:No results found for: PHART, AFI8LZE, PO2ART, HZL5QDQ, P2BKJVDC, BEART, SOURCE    Imaging Studies: I have personally reviewed pertinent reports  and I have personally reviewed pertinent films in PACS    XR chest 1 view portable    Result Date: 3/16/2023  Impression: No acute cardiopulmonary disease  Workstation performed: [id number]     TRAUMA - CT head wo contrast    Result Date: 3/15/2023  Impression: No acute intracranial abnormality  Workstation performed: [id number]     TRAUMA - CT spine cervical wo contrast    Result Date: 3/15/2023  Impression: No acute cervical spine fracture or traumatic malalignment  Workstation performed: [id number]     MRI lumbar spine wo contrast    Result Date: 3/15/2023  Impression: 1  Stable L1 burst fracture  Moderate central canal stenosis at the L1 level and crowding of the cauda equina  2   Interspinous ligamentous injury at T12-L1  3  Disc and facet degenerative change throughout the mid and lower lumbar spine resulting in multilevel nerve root encroachment  Workstation performed: [id number]     XR finger right second digit-index    Result Date: 3/16/2023  Impression: No acute osseous abnormality  Workstation performed: [id number]     XR pelvis ap only 1 or 2 vw    Result Date: 3/16/2023  Impression: No acute osseous abnormality   Workstation performed: [id number]     TRAUMA - CT chest abdomen pelvis w contrast    Result Date: 3/15/2023  Impression: 1  L1 burst fracture and right L1 transverse process fracture  Moderate narrowing of the central canal  2   No evidence of acute trauma in the chest, abdomen or pelvis  I personally discussed this study with Carroll Mattson on 3/15/2023 at 4:37 PM  Workstation performed: [id number]     CT recon only thoracolumbar (no charge)    Result Date: 3/15/2023  Impression: 1  L1 burst fracture  At least moderate central canal stenosis  2   Right L1 transverse process fracture  Workstation performed: [id number]       EKG, Pathology, and Other Studies: I have personally reviewed pertinent reports        VTE Pharmacologic Prophylaxis: Enoxaparin (Lovenox)    VTE Mechanical Prophylaxis: sequential compression device

## 2023-03-17 NOTE — ASSESSMENT & PLAN NOTE
- no prior hx of urinary retention  - likely due to acute spinal cord compression  - urinary retention protocol

## 2023-03-17 NOTE — ASSESSMENT & PLAN NOTE
- multimodal pain regimen  - scheduled tylenol, robaxin, gabapentin  - PRN oxycodone, dilaudid  - senekot, docusate bowel regimen

## 2023-03-17 NOTE — RESTORATIVE TECHNICIAN NOTE
Restorative Technician Note      Patient Name: Rylie Farias  Patient Active Problem List   Diagnosis   • Shoulder joint pain   • Strain of supraspinatus muscle or tendon   • Vitamin D deficiency   • Closed unstable burst fracture of first lumbar vertebra (HCC)   • Urinary retention   • Acute midline low back pain without sciatica   • DVT prophylaxis   • Thrombocytopenia (HCC)     Note Type: Bracing, Initial consult  Patient Position Upon Consult: Supine  Brace Applied: [employer] 456 Back Pack TLSO  Additional Brace Ordered: No  Patient Position When Brace Applied: Seated  Education Provided: Yes  Patient Position at End of Consult: Supine; All needs within reach; Bed/Chair alarm activated  Nurse Communication: Nurse aware of consult, application of brace (Please contact Ortho Tech [phone] regarding bracing instructions/adjustments   Thank you!)    Kirill GIRON, Restorative Technician,

## 2023-03-17 NOTE — TELEPHONE ENCOUNTER
3/17/23- PT IN HOSPITAL   2WK POV SCHEDULED 3/30/23  6WK POV SCHEDULED 4/27/23  PT WILL NEED THORACOLUMBAR XRAYS    ----- Message from [address] sent at 3/17/2023  3:19 PM EDT -----  Patient needs 2-week incision check with nurse and 6-week postop visit with repeat thoracolumbar spine x-ray with Dr Manas Mao, surgery was 3/16/2023

## 2023-03-17 NOTE — ASSESSMENT & PLAN NOTE
POD 1 fusion lumbar/thoracic posterior percutaneous screw fixation T11-L3  · Presented status post fall from approximately 10 feet from a roof, imaging demonstrated traumatic L1 burst fracture with mild superior retropulsion into canal   Preop patient had some urinary retention  Postop imaging pending    Plan:  · Continue neurological checks  · Postop x-rays pending  · Ordered TLSO brace to be worn when out of bed or head of bed greater than 45 degrees, patient will likely need to wear a brace for approximately 6 weeks  · No drain  · Gustafson catheter removed this morning, patient DTV  · Patient tolerating his diet  · Patient states his pain is well controlled on current regimen  Medical management and pain control per primary team  · Mobilize patient as tolerated  · PT/OT eval pending  · DVT PPX: SCDs and Lovenox per trauma    Neurosurgery will continue to follow, pending postop x-rays, call with any further questions or concerns

## 2023-03-17 NOTE — DISCHARGE INSTR - AVS FIRST PAGE
Discharge Instructions  Posterior Thoracic/Lumbar Fusion/Fixation      Surgical incisional care:  Maintain dressing in place for 3 days  On postoperative day 3, dressing may be removed and incision may be left open to air  Keep incision clean and dry  Avoid applying creams, lotion or antiseptic to incision area  Check the wound daily  If the incision becomes red, swollen, tender, warm, or has increased drainage please notify physician immediately  If steri-strips are in place, allow them to fall off  If still in place at two week postoperative visit, we will remove them  May shower 3 days after surgery, but do not soak in a tub and no swimming until cleared  Incision may be cleaned with water and a mild antimicrobial soap using a clean washcloth  Incision is to be gently patted dry with a clean towel  Continue to change bed linens and pajamas more frequently  Wear clean clothes daily  Activity Restrictions:  No heavy lifting greater than 10lbs and no strenuous activities until cleared  No bending or twisting  No BLTs (bending, lifting, twisting)  Avoid pushing/pulling movements  May walk as tolerated  Encourage at least 4 short walks per day  No prolonged sitting  No driving for at least 2 weeks or until cleared by physician  If you were provided a brace, it is to be worn whenever you are out of bed until directed otherwise  It may be put on while sitting at bedside  Postoperative medication:  Take pain medications to relieve incision pain, and muscle relaxants to prevent spasms as directed  Please see after visit summary (AVS) for details  Do not operate heavy machinery or vehicles while taking sedating medications  Use a bowel regimen while on opioids as they induce constipation  Ie  Senokot-S, Miralax, Colace, etc  Increase fiber and water intake  Do not take ibuprofen, Naproxen/Aleve or any non steroidal anti-inflammatory medications, NSAIDs, until cleared by surgeon   May take Tylenol instead  If taking Coumadin, Aspirin, or Plavix, you may resume these medications when cleared by Neurosurgery  Follow-up as scheduled for a 2 week incision check  Follow-up as scheduled for 6 week postoperative visit with repeat Xrays       **Please notify MD if you experience a fever 101F, chills or have increased pain, numbness, tingling, or weakness in your legs, increased walking difficulties and/or bowel/bladder dysfunction/incontinence**

## 2023-03-17 NOTE — PHYSICAL THERAPY NOTE
PHYSICAL THERAPY EVALUATION          Patient Name: Huy Swenson  Today's Date: 3/17/2023       03/17/23 0915   PT Last Visit   PT Visit Date 03/17/23   Note Type   Note type Evaluation   Pain Assessment   Pain Assessment Tool 0-10   Pain Score 7   Pain Onset/Description Onset: Ongoing; Descriptor: Sore   Effect of Pain on Daily Activities Decreased tolerance for mobility   Patient's Stated Pain Goal No pain   Hospital Pain Intervention(s) Repositioned; Ambulation/increased activity   Restrictions/Precautions   Weight Bearing Precautions Per Order No   Braces or Orthoses   (None ordered)   Other Precautions Fall Risk;Pain;Spinal precautions   Home Living   Type of [address] [place] [address] One level; Able to live on main level with bedroom/bathroom;Stairs to enter with rails  (3 YELENA)   Bathroom Shower/Tub Tub/shower unit   Bathroom Toilet Standard   Bathroom Equipment   (None per pt report)   [address]   (None per pt report)   Additional Comments Pt reports living with wife in a one story house with 3 YELENA  States wife works from home and is willing and able to assist as needed   Prior Function   Level of [place] Independent with ADLs; Independent with functional mobility; Independent with IADLS   Lives With Spouse   Receives Help From Family   IADLs Independent with driving; Independent with meal prep; Independent with medication management   Falls in the last 6 months 0   Vocational Full time employment  (Works from home)   Comments Pt denies the use of AD for amb PTA   General   Family/Caregiver Present No   Cognition   Overall Cognitive Status WFL   Arousal/Participation Alert   Attention Within functional limits   Orientation Level Oriented X4   Memory Within functional limits   Following Commands Follows all commands and directions without difficulty   Comments Pt pleasant and cooperative throughout session   RLE Assessment   RLE Assessment WFL   Strength RLE   RLE Overall Strength 3+/5  (functionally assessed)   LLE Assessment   LLE Assessment WFL   Strength LLE   LLE Overall Strength 3+/5  (functionally assessed)   Bed Mobility   Rolling R 4  Minimal assistance   Additional items Assist x 1;HOB elevated; Bedrails; Increased time required;Verbal cues   Supine to Sit 4  Minimal assistance   Additional items Increased time required;Assist x 1;HOB elevated; Bedrails;LE management   Additional Comments Pt sat EOB for 3 min to don socks   Transfers   Sit to Stand 4  Minimal assistance   Additional items Assist x 1; Increased time required;Verbal cues   Stand to Sit 4  Minimal assistance   Additional items Assist x 1; Increased time required;Verbal cues;Armrests   Additional Comments c RW, VC for hand placement   Ambulation/Elevation   Gait pattern Improper Weight shift; Forward Flexion;Decreased foot clearance; Inconsistent claribel; Short stride; Step to;Excessively slow   Gait Assistance 4  Minimal assist   Additional items Assist x 1;Verbal cues; Tactile cues   Assistive Device Rolling walker   Distance 15'x2   Stair Management Assistance Not tested   Ambulation/Elevation Additional Comments amb distance limited by pain   Balance   Static Sitting Fair -   Dynamic Sitting Fair -   Static Standing Poor +   Dynamic Standing Poor +   Ambulatory Poor +   Endurance Deficit   Endurance Deficit Yes   Activity Tolerance   Activity Tolerance Patient limited by pain   Medical Staff Erwin Holly, SARY and Daniele Kerns OT present for co-evaluation secondary to patient presentation  Stephanie Isaacs, ANA present for student observation   Nurse Made Aware clearance per RN   Assessment   Prognosis Good   Problem List Decreased strength;Decreased endurance; Impaired balance;Decreased mobility;Orthopedic restrictions;Pain   Assessment Pt presented to [facility] s/p L1 burst fracture and T11-L3 fusion POD1  No brace ordered per neurosurgery  PMH unremarkable  Pt being seen for high complexity PT evaluation due to ongoing medical management for primary diagnosis, continuous pulse oximetry monitoring, utilization of new assistive device, education on spinal precautions, and decreased activity tolerance compared to baseline  Pt reports living with spouse in a 1 SH with 3 YELENA  PTA pt was independent with all ADLs, IADLs, and functional mobility without AD  Pt states his wife works from home and is willing and able to help as needed  Provided pt education on spinal precautions during functional mobility and transfers  Pt performs bed mobility with Min Ax1, transfers with Min Ax1, ambulation with Min Ax1 and use of RW  Stairs not assessed at this time secondary to pt presentation  Amb distance limited by pain  At conclusion of PT evaluation, pt was seated in chair with all needs within reach  Pt presented at PT evaluation with decreased BLE strength and ROM, impaired static and dynamic balance, decreased endurance, pain, orthopedic restrictions, and gait deviations, and will continue to benefit from skilled PT services during hospital stay  Due to the impairments listed above, PT d/c recommendation when medically cleared is home with outpatient skilled PT services pending progress with gait and stair training and pain reduction  Barriers to Discharge Inaccessible home environment   Barriers to Discharge Comments Pt would benefit from further gait and stair training prior to d/c   Goals   Patient Goals To go home   STG Expiration Date 03/27/23   Short Term Goal #1 In 10 days pt will complete: 1) Bed mobility skills with Mod I to increase safety and independence as well as decrease caregiver burden  2) Functional transfers with Mod I to promote increased independence, safety, and QOL   3) Ambulate 200' using least restrictive AD with Mod I without LOB and stable vitals so that pt can negotiate previous living environment safely and promote independence with functional mobility and return to PLOF  4) Stair training up/ down 3+ step/s using rail/s with Mod I so that pt can enter/negotiate previous living environment safely and decrease fall risk  5) Improve balance grades by 1/2 grade to increase safety with all mobility and decrease fall risk  6) Improve BLE strength by 1/2 grade to help increase overall functional mobility and decrease fall risk  PT Treatment Day 0   Plan   Treatment/Interventions Functional transfer training;LE strengthening/ROM; Elevations; Therapeutic exercise; Endurance training;Patient/family training;Equipment eval/education; Bed mobility;Gait training;Spoke to nursing;OT   PT Frequency 3-5x/wk   Recommendation   PT Discharge Recommendation Home with outpatient rehabilitation   Equipment Recommended [facility] Recommended Wheeled walker   Change/add to [employer]? No   Additional Comments pending pain reduction   AM-PAC Basic Mobility Inpatient   Turning in Flat Bed Without Bedrails 3   Lying on Back to Sitting on Edge of Flat Bed Without Bedrails 3   Moving Bed to Chair 3   Standing Up From Chair Using Arms 3   Walk in Room 2   Climb 3-5 Stairs With Railing 2   Basic Mobility Inpatient Raw Score 16   Basic Mobility Standardized Score 38 32   Highest Level Of Mobility   -HLM Goal 5: Stand one or more mins   -HLM Achieved 7: Walk 25 feet or more   Modified Lizzy Scale   Modified Lizzy Scale 4   Barthel Index   Feeding 10   Bathing 0   Grooming Score 0   Dressing Score 5   Bladder Score 10   Bowels Score 10   Toilet Use Score 5   Transfers (Bed/Chair) Score 5   Mobility (Level Surface) Score 0   Stairs Score 0   Barthel Index Score 45   End of Consult   Patient Position at End of Consult Bedside chair; All needs within reach     [facility], [facility]  03/17/23

## 2023-03-17 NOTE — TREATMENT PLAN
Reviewed upright x-rays, official read pending, per my interpretation grossly stable spinal alignment as well as no evidence of hardware failure, stable postoperative changes  Continue TLSO brace to be worn when out of bed or head of bed greater than 45 degrees anticipate patient will be wearing for approximately 6 weeks  Patient is voiding without difficulty  Pain control and medical management per trauma team   Neurosurgery will sign off, patient will follow-up in 2 weeks for incision check in 6 weeks for postoperative visit with repeat upright x-rays, call with any further questions or concerns

## 2023-03-17 NOTE — PLAN OF CARE
Problem: MOBILITY - ADULT  Goal: Maintain or return to baseline ADL function  Description: INTERVENTIONS:  -  Assess patient's ability to carry out ADLs; assess patient's baseline for ADL function and identify physical deficits which impact ability to perform ADLs (bathing, care of mouth/teeth, toileting, grooming, dressing, etc )  - Assess/evaluate cause of self-care deficits   - Assess range of motion  - Assess patient's mobility; develop plan if impaired  - Assess patient's need for assistive devices and provide as appropriate  - Encourage maximum independence but intervene and supervise when necessary  - Involve family in performance of ADLs  - Assess for home care needs following discharge   - Consider OT consult to assist with ADL evaluation and planning for discharge  - Provide patient education as appropriate  Outcome: Progressing  Goal: Maintains/Returns to pre admission functional level  Description: INTERVENTIONS:  - Perform BMAT or MOVE assessment daily    - Set and communicate daily mobility goal to care team and patient/family/caregiver  - Collaborate with rehabilitation services on mobility goals if consulted  - Perform Range of Motion  times a day  - Reposition patient every  hours    - Dangle patient  times a day  - Stand patient  times a day  - Ambulate patient  times a day  - Out of bed to chair  times a day   - Out of bed for meals  times a day  - Out of bed for toileting  - Record patient progress and toleration of activity level   Outcome: Progressing     Problem: Prexisting or High Potential for Compromised Skin Integrity  Goal: Skin integrity is maintained or improved  Description: INTERVENTIONS:  - Identify patients at risk for skin breakdown  - Assess and monitor skin integrity  - Assess and monitor nutrition and hydration status  - Monitor labs   - Assess for incontinence   - Turn and reposition patient  - Assist with mobility/ambulation  - Relieve pressure over bony prominences  - Avoid friction and shearing  - Provide appropriate hygiene as needed including keeping skin clean and dry  - Evaluate need for skin moisturizer/barrier cream  - Collaborate with interdisciplinary team   - Patient/family teaching  - Consider wound care consult   Outcome: Progressing     Problem: NEUROSENSORY - ADULT  Goal: Achieves stable or improved neurological status  Description: INTERVENTIONS  - Monitor and report changes in neurological status  - Monitor vital signs such as temperature, blood pressure, glucose, and any other labs ordered   - Initiate measures to prevent increased intracranial pressure  - Monitor for seizure activity and implement precautions if appropriate      Outcome: Progressing  Goal: Remains free of injury related to seizures activity  Description: INTERVENTIONS  - Maintain airway, patient safety  and administer oxygen as ordered  - Monitor patient for seizure activity, document and report duration and description of seizure to physician/advanced practitioner  - If seizure occurs,  ensure patient safety during seizure  - Reorient patient post seizure  - Seizure pads on all 4 side rails  - Instruct patient/family to notify RN of any seizure activity including if an aura is experienced  - Instruct patient/family to call for assistance with activity based on nursing assessment  - Administer anti-seizure medications if ordered    Outcome: Progressing  Goal: Achieves maximal functionality and self care  Description: INTERVENTIONS  - Monitor swallowing and airway patency with patient fatigue and changes in neurological status  - Encourage and assist patient to increase activity and self care     - Encourage visually impaired, hearing impaired and aphasic patients to use assistive/communication devices  Outcome: Progressing     Problem: GASTROINTESTINAL - ADULT  Goal: Minimal or absence of nausea and/or vomiting  Description: INTERVENTIONS:  - Administer IV fluids if ordered to ensure adequate hydration  - Maintain NPO status until nausea and vomiting are resolved  - Nasogastric tube if ordered  - Administer ordered antiemetic medications as needed  - Provide nonpharmacologic comfort measures as appropriate  - Advance diet as tolerated, if ordered  - Consider nutrition services referral to assist patient with adequate nutrition and appropriate food choices  Outcome: Progressing  Goal: Maintains or returns to baseline bowel function  Description: INTERVENTIONS:  - Assess bowel function  - Encourage oral fluids to ensure adequate hydration  - Administer IV fluids if ordered to ensure adequate hydration  - Administer ordered medications as needed  - Encourage mobilization and activity  - Consider nutritional services referral to assist patient with adequate nutrition and appropriate food choices  Outcome: Progressing  Goal: Maintains adequate nutritional intake  Description: INTERVENTIONS:  - Monitor percentage of each meal consumed  - Identify factors contributing to decreased intake, treat as appropriate  - Assist with meals as needed  - Monitor I&O, weight, and lab values if indicated  - Obtain nutrition services referral as needed  Outcome: Progressing     Problem: GENITOURINARY - ADULT  Goal: Maintains or returns to baseline urinary function  Description: INTERVENTIONS:  - Assess urinary function  - Encourage oral fluids to ensure adequate hydration if ordered  - Administer IV fluids as ordered to ensure adequate hydration  - Administer ordered medications as needed  - Offer frequent toileting  - Follow urinary retention protocol if ordered  Outcome: Progressing  Goal: Absence of urinary retention  Description: INTERVENTIONS:  - Assess patient’s ability to void and empty bladder  - Monitor I/O  - Bladder scan as needed  - Discuss with physician/AP medications to alleviate retention as needed  - Discuss catheterization for long term situations as appropriate  Outcome: Progressing     Problem: SKIN/TISSUE INTEGRITY - ADULT  Goal: Skin Integrity remains intact(Skin Breakdown Prevention)  Description: Assess:  -Perform Leandro assessment every   -Clean and moisturize skin every   -Inspect skin when repositioning, toileting, and assisting with ADLS  -Assess under medical devices such as  every   -Assess extremities for adequate circulation and sensation     Bed Management:  -Have minimal linens on bed & keep smooth, unwrinkled  -Change linens as needed when moist or perspiring  -Avoid sitting or lying in one position for more than  hours while in bed  -Keep HOB at degrees     Toileting:  -Offer bedside commode  -Assess for incontinence every   -Use incontinent care products after each incontinent episode such as     Activity:  -Mobilize patient  times a day  -Encourage activity and walks on unit  -Encourage or provide ROM exercises   -Turn and reposition patient every  Hours  -Use appropriate equipment to lift or move patient in bed  -Instruct/ Assist with weight shifting every  when out of bed in chair  -Consider limitation of chair time  hour intervals    Skin Care:  -Avoid use of baby powder, tape, friction and shearing, hot water or constrictive clothing  -Relieve pressure over bony prominences using   -Do not massage red bony areas    Next Steps:  -Teach patient strategies to minimize risks such as   -Consider consults to  interdisciplinary teams such as   Outcome: Progressing  Goal: Incision(s), wounds(s) or drain site(s) healing without S/S of infection  Description: INTERVENTIONS  - Assess and document dressing, incision, wound bed, drain sites and surrounding tissue  - Provide patient and family education  - Perform skin care/dressing changes every   Outcome: Progressing I have personally seen and examined this patient.  I have fully participated in the care of this patient. I have reviewed all pertinent clinical information, including history, physical exam, plan and the Resident’s note and agree except as noted.

## 2023-03-17 NOTE — CASE MANAGEMENT
Case Management Discharge Planning Note    Patient name Zen Moore  [facility] [id number]  : 1960 Date 3/17/2023       Current Admission Date: 3/15/2023  Current Admission Diagnosis:Closed unstable burst fracture of first lumbar vertebra [facility])   Patient Active Problem List    Diagnosis Date Noted   • Thrombocytopenia ([facility] [address] ) 2023   • Closed unstable burst fracture of first lumbar vertebra ([facility] [address] ) 2023   • Urinary retention 2023   • Acute midline low back pain without sciatica 2023   • DVT prophylaxis 2023   • Shoulder joint pain 2023   • Strain of supraspinatus muscle or tendon 2023   • Vitamin D deficiency 2013      LOS (days): 2  Geometric Mean LOS (GMLOS) (days): 2 80  Days to GMLOS:1 1     OBJECTIVE:  Risk of Unplanned Readmission Score: 11 21         Current admission status: Inpatient   Preferred Pharmacy:   [facility] [address] [place] [address]  Phone: [phone] Fax: [phone]    Primary Care Provider: Rah Eubanks MD    Primary Insurance: BLUE CROSS  Secondary Insurance:     DISCHARGE DETAILS:    Pt seen by OT/PT and recommended for a home d/c with OP therapy  CM will continue to follow for d/c needs

## 2023-03-17 NOTE — PLAN OF CARE
Problem: OCCUPATIONAL THERAPY ADULT  Goal: Performs self-care activities at highest level of function for planned discharge setting  See evaluation for individualized goals  Description: Treatment Interventions: ADL retraining, Functional transfer training, Endurance training, Patient/family training, Equipment evaluation/education, Compensatory technique education, Continued evaluation, Energy conservation, Activityengagement          See flowsheet documentation for full assessment, interventions and recommendations  Note: Limitation: Decreased ADL status, Decreased Safe judgement during ADL, Decreased self-care trans, Decreased high-level ADLs, Decreased endurance  Prognosis: Good  Assessment: Pt is a 58 y o  male seen for OT evaluation s/p admission to [facility] on 3/15/2023 due to fall through floor and lower back pain  Pt diagnosed with Closed unstable burst fracture of first lumbar vertebra ([address] )  Pt is POD #1 fusion lumbar/thoracic posterior screw fixation T11-L3  PTA, pt living in [facility] with wife, (I) with ADLs, (I) with IADLs, (-) falls, (+) driving, and no use of AD for functional mobility  Pt is motivated to return to home  Personal and environmental factors supporting pt at time of IE include age and social support  Personal and environmental factors inhibiting engagement in occupations include difficulty completing ADLs and difficulty completing IADLs  During evaluation pt performed as is outlined above in flowsheet  Pt required verbal cues for hand placement   Standardized assessments used to assist in identifying performance deficits include [facility] 6-Clicks  Performance deficits that affect the pt’s occupational performance during the initial evaluation include impaired balance, functional mobility, endurance, activity tolerance, forward functional reach and functional standing tolerance   Based on pt’s functional performance and deficits the following occupations will be addressed in OT treatments in order to maximize pt’s independence and overall occupational performance: grooming, bathing/showering, toileting and toilet hygiene, dressing and functional mobility  Goals are listed below  Upon discharge from acute care setting recommend d/c to Home with OP Therapy       OT Discharge Recommendation: Home with outpatient rehabilitation

## 2023-03-17 NOTE — PLAN OF CARE
Problem: MOBILITY - ADULT  Goal: Maintain or return to baseline ADL function  Description: INTERVENTIONS:  -  Assess patient's ability to carry out ADLs; assess patient's baseline for ADL function and identify physical deficits which impact ability to perform ADLs (bathing, care of mouth/teeth, toileting, grooming, dressing, etc )  - Assess/evaluate cause of self-care deficits   - Assess range of motion  - Assess patient's mobility; develop plan if impaired  - Assess patient's need for assistive devices and provide as appropriate  - Encourage maximum independence but intervene and supervise when necessary  - Involve family in performance of ADLs  - Assess for home care needs following discharge   - Consider OT consult to assist with ADL evaluation and planning for discharge  - Provide patient education as appropriate  Outcome: Progressing     Problem: Prexisting or High Potential for Compromised Skin Integrity  Goal: Skin integrity is maintained or improved  Description: INTERVENTIONS:  - Identify patients at risk for skin breakdown  - Assess and monitor skin integrity  - Assess and monitor nutrition and hydration status  - Monitor labs   - Assess for incontinence   - Turn and reposition patient  - Assist with mobility/ambulation  - Relieve pressure over bony prominences  - Avoid friction and shearing  - Provide appropriate hygiene as needed including keeping skin clean and dry  - Evaluate need for skin moisturizer/barrier cream  - Collaborate with interdisciplinary team   - Patient/family teaching  - Consider wound care consult   Outcome: Progressing     Problem: NEUROSENSORY - ADULT  Goal: Achieves stable or improved neurological status  Description: INTERVENTIONS  - Monitor and report changes in neurological status  - Monitor vital signs such as temperature, blood pressure, glucose, and any other labs ordered   - Initiate measures to prevent increased intracranial pressure  - Monitor for seizure activity and implement precautions if appropriate      Outcome: Progressing     Problem: CARDIOVASCULAR - ADULT  Goal: Maintains optimal cardiac output and hemodynamic stability  Description: INTERVENTIONS:  - Monitor I/O, vital signs and rhythm  - Monitor for S/S and trends of decreased cardiac output  - Administer and titrate ordered vasoactive medications to optimize hemodynamic stability  - Assess quality of pulses, skin color and temperature  - Assess for signs of decreased coronary artery perfusion  - Instruct patient to report change in severity of symptoms  Outcome: Progressing     Problem: RESPIRATORY - ADULT  Goal: Achieves optimal ventilation and oxygenation  Description: INTERVENTIONS:  - Assess for changes in respiratory status  - Assess for changes in mentation and behavior  - Position to facilitate oxygenation and minimize respiratory effort  - Oxygen administered by appropriate delivery if ordered  - Initiate smoking cessation education as indicated  - Encourage broncho-pulmonary hygiene including cough, deep breathe, Incentive Spirometry  - Assess the need for suctioning and aspirate as needed  - Assess and instruct to report SOB or any respiratory difficulty  - Respiratory Therapy support as indicated  Outcome: Progressing     Problem: GASTROINTESTINAL - ADULT  Goal: Minimal or absence of nausea and/or vomiting  Description: INTERVENTIONS:  - Administer IV fluids if ordered to ensure adequate hydration  - Maintain NPO status until nausea and vomiting are resolved  - Nasogastric tube if ordered  - Administer ordered antiemetic medications as needed  - Provide nonpharmacologic comfort measures as appropriate  - Advance diet as tolerated, if ordered  - Consider nutrition services referral to assist patient with adequate nutrition and appropriate food choices  Outcome: Progressing     Problem: GENITOURINARY - ADULT  Goal: Maintains or returns to baseline urinary function  Description: INTERVENTIONS:  - Assess urinary function  - Encourage oral fluids to ensure adequate hydration if ordered  - Administer IV fluids as ordered to ensure adequate hydration  - Administer ordered medications as needed  - Offer frequent toileting  - Follow urinary retention protocol if ordered  Outcome: Progressing     Problem: SKIN/TISSUE INTEGRITY - ADULT  Goal: Skin Integrity remains intact(Skin Breakdown Prevention)  Description: Assess:  -Perform Leandro assessment   -Clean and moisturize skin   -Inspect skin when repositioning, toileting, and assisting with ADLS  -Assess under medical devices such  -Assess extremities for adequate circulation and sensation     Bed Management:  -Have minimal linens on bed & keep smooth, unwrinkled  -Change linens as needed when moist or perspiring  -Avoid sitting or lying in one position  -Keep HOB   Toileting:  -Offer bedside commode  -Assess for incontinence   -Use incontinent care products after each incontinent episode     Activity:  -Encourage activity and walks on unit  -Encourage or provide ROM exercises     Skin Care:  -Avoid use of baby powder, tape, friction and shearing, hot water or constrictive clothing  Outcome: Progressing

## 2023-03-17 NOTE — QUICK NOTE
Post op check - Trauma  Zully Matthews  58 y o  male MRN: [id number]  Unit/Bed#: [facility] 610-01 Encounter: [id number]    Assessment:  58 y o  male now Day of Surgery s/p Procedure(s) (LRB):  FUSION LUMBAR/THORACIC POSTERIOR PERCUTANEOUS SCREW FIXATION T11 -L3(NAVIGATED) (N/A)     Hypertensive w/ systolic in the 708L; other VS stable on room air  Neurologically intact w/ no LE motor or sensory deficits  Plan:  - Diet Regular; Regular House  - Pain and Nausea control PRN  - Incentive spirometry    Subjective/Objective     Subjective:   - c/o: back pain  Objective:   Vitals: Blood pressure 147/95, pulse 76, temperature 98 7 °F (37 1 °C), resp  rate 19, SpO2 94 %  ,There is no height or weight on file to calculate BMI  I/O       03/15 0701  03/16 0700 03/16 0701 03/17 0700    P  O   0    I V  482 5 2135    Total Intake 482 5 2135    Urine 1450 475    Blood  30    Total Output 1450 505    Net -967 5 +1630                Physical Exam:  Gen: NAD, Aox3, Comfortable in bed  Chest: Normal work of breathing, no respiratory distress  Abd: Soft, ND, NT  Ext: No Edema  Skin: Warm, Dry, Intact      Lab, Imaging and other studies:   I have personally reviewed pertinent reports    , CBC with diff:   Lab Results   Component Value Date    WBC 7 73 03/16/2023    HGB 14 1 03/16/2023    HCT 42 1 03/16/2023    MCV 97 03/16/2023     (L) 03/16/2023    MCH 32 3 03/16/2023    MCHC 33 5 03/16/2023    RDW 12 8 03/16/2023    MPV 11 3 03/16/2023    NRBC 0 03/16/2023   , BMP/CMP:   Lab Results   Component Value Date    SODIUM 139 03/16/2023    K 3 6 03/16/2023     03/16/2023    CO2 27 03/16/2023    BUN 18 03/16/2023    CREATININE 0 73 03/16/2023    CALCIUM 8 6 03/16/2023    EGFR 99 03/16/2023     VTE Pharmacologic Prophylaxis: Sequential compression device (Venodyne)   VTE Mechanical Prophylaxis: sequential compression device

## 2023-03-17 NOTE — ASSESSMENT & PLAN NOTE
Platelets 220 this morning, spoke with Clarence CORRIGAN with trauma team  Per trauma okay to continue Lovenox  Management per primary team

## 2023-03-17 NOTE — OCCUPATIONAL THERAPY NOTE
Occupational Therapy Evaluation     Patient Name: Zen Moore  Today's Date: 3/17/2023  Problem List  Principal Problem:    Closed unstable burst fracture of first lumbar vertebra (HCC)  Active Problems:    Urinary retention    Acute midline low back pain without sciatica    DVT prophylaxis    Thrombocytopenia ([address] )    Past Medical History  Past Medical History:   Diagnosis Date    Known health problems: none      Past Surgical History  Past Surgical History:   Procedure Laterality Date    LUMBAR FUSION N/A 3/16/2023    Procedure: FUSION LUMBAR/THORACIC POSTERIOR PERCUTANEOUS SCREW FIXATION T11 -L3(NAVIGATED); Surgeon: Nancy Brandon MD;  [facility]: [facility];  Service: Neurosurgery    ROTATOR CUFF REPAIR Right     SHOULDER SURGERY           03/17/23 0907   OT Last Visit   OT Visit Date 03/17/23   Note Type   Note type Evaluation   Pain Assessment   Pain Assessment Tool 0-10   Pain Score 7   Pain Location/Orientation Orientation: Lower; Location: Back   Hospital Pain Intervention(s) Repositioned; Ambulation/increased activity; Emotional support   Restrictions/Precautions   Weight Bearing Precautions Per Order No   Braces or Orthoses   (none ordered)   Other Precautions Pain; Fall Risk;Spinal precautions   Home Living   Type of [address] [place] [address] One level; Able to live on main level with bedroom/bathroom  (3 YELENA)   Bathroom Shower/Tub Tub/shower unit   Bathroom Toilet Standard   Bathroom Accessibility Accessible   Additional Comments pt reports no use of AD for functional mobility   Prior Function   Level of [place] Independent with ADLs; Independent with functional mobility; Independent with IADLS   Lives With Spouse   Receives Help From Family   IADLs Independent with driving; Independent with meal prep; Independent with medication management   Falls in the last 6 months 0   Vocational Full time employment  (works from home for [employer])   Lifestyle   Autonomy PTA, pt living in [facility] with wife, (I) with ADLs, (I) with IADLs, (-) falls, (+) driving, and no use of AD for functional mobility  Reciprocal Relationships supportive wife   Service to Others full-time employment   [employer] likes to work on home [address] "It hurts"   ADL   Eating Assistance 7  Independent   Grooming Assistance 5  [address] [facility] 5  [address] [facility] 4  [address] 5  Supervision/Setup    [facility] 4  Minimal Assistance   LB Dressing Deficit Don/doff R sock; Don/doff L sock; Supervision/safety;Setup   Toileting Assistance  4  Minimal Assistance   Bed Mobility   Rolling R 4  Minimal assistance   Additional items Assist x 1;HOB elevated; Bedrails; Increased time required;Verbal cues   Supine to Sit 4  Minimal assistance   Additional items Increased time required;Verbal cues; Assist x 1;Bedrails   Additional Comments pt sat at EOB for 2-3 minutes to don socks   Transfers   Sit to Stand 4  Minimal assistance   Additional items Assist x 1; Increased time required;Verbal cues;HOB elevated   Stand to Sit 4  Minimal assistance   Additional items Assist x 1; Increased time required;Verbal cues;Armrests   Additional Comments use of RW and verbal cues for hand placement   Functional Mobility   Functional Mobility 4  Minimal assistance   Additional Comments Ax1, functional household distances, increased time due to pain   Additional items Rolling walker   Balance   Static Sitting Fair -   Dynamic Sitting Fair -   Static Standing Poor +   Dynamic Standing Poor +   Ambulatory Poor +   Activity Tolerance   Activity Tolerance Patient limited by pain   Medical Staff Made Aware SPT Marleni Hughes, PT [place]   Nurse Made Aware clearance per RN   RUE Assessment   RUE Assessment WFL   LUE Assessment   LUE Assessment WFL   Hand Function   Gross Motor Coordination Functional   Fine Motor Coordination Functional Cognition   Overall Cognitive Status WFL   Arousal/Participation Alert; Cooperative   Attention Within functional limits   Orientation Level Oriented X4   Memory Within functional limits   Following Commands Follows all commands and directions without difficulty   Comments pt pleasant and cooperative throughout session   Assessment   Limitation Decreased ADL status; Decreased Safe judgement during ADL;Decreased self-care trans;Decreased high-level ADLs; Decreased endurance   Prognosis Good   Assessment Pt is a 58 y o  male seen for OT evaluation s/p admission to [facility] on 3/15/2023 due to fall through floor and lower back pain  Pt diagnosed with Closed unstable burst fracture of first lumbar vertebra ([address] )  Pt is POD #1 fusion lumbar/thoracic posterior screw fixation T11-L3  PTA, pt living in [facility] with wife, (I) with ADLs, (I) with IADLs, (-) falls, (+) driving, and no use of AD for functional mobility  Pt is motivated to return to home  Personal and environmental factors supporting pt at time of IE include age and social support  Personal and environmental factors inhibiting engagement in occupations include difficulty completing ADLs and difficulty completing IADLs  During evaluation pt performed as is outlined above in flowsheet  Pt required verbal cues for hand placement   Standardized assessments used to assist in identifying performance deficits include [facility] 6-Clicks  Performance deficits that affect the pt’s occupational performance during the initial evaluation include impaired balance, functional mobility, endurance, activity tolerance, forward functional reach and functional standing tolerance  Based on pt’s functional performance and deficits the following occupations will be addressed in OT treatments in order to maximize pt’s independence and overall occupational performance: grooming, bathing/showering, toileting and toilet hygiene, dressing and functional mobility  Goals are listed below  Upon discharge from acute care setting recommend d/c to Home with OP Therapy  Goals   Patient Goals to go home   LTG Time Frame 10-14   Long Term Goal see goals listed below   Plan   Treatment Interventions ADL retraining;Functional transfer training; Endurance training;Patient/family training;Equipment evaluation/education; Compensatory technique education;Continued evaluation; Energy conservation; Activityengagement   Goal Expiration Date 03/31/23   OT Frequency 3-5x/wk   Recommendation   OT Discharge Recommendation Home with outpatient rehabilitation   AM-PAC Daily Activity Inpatient   Lower Body Dressing 3   Bathing 3   Toileting 3   Upper Body Dressing 4   Grooming 4   Eating 4   Daily Activity Raw Score 21   Daily Activity Standardized Score (Calc for Raw Score >=11) 44 27   AM-PAC Applied Cognition Inpatient   Following a Speech/Presentation 4   Understanding Ordinary Conversation 4   Taking Medications 4   Remembering Where Things Are Placed or Put Away 4   Remembering List of 4-5 Errands 4   Taking Care of Complicated Tasks 4   Applied Cognition Raw Score 24   Applied Cognition Standardized Score 62 21   End of Consult   Education Provided Yes  (pt educated on spinal precautions)   Patient Position at End of Consult Bedside chair; All needs within reach   Nurse Communication Nurse aware of consult     Pt will perform functional transfers from supine to EOB with mod (I) to improve participation in morning routine  Pt will perform functional transfers to/from all surfaces with mod (I) to improve participation in daily routines  Pt will perform functional mobility functional household distances with mod (I) to increase participation in daily routines  Pt will perform UB dressing with mod (I) to improve participation in morning routines  Pt will perform UB bathing with mod (I) to improve participation in ADLs        Pt will perform LB dressing with mod (I) and use of long-handled equipment as needed to improve participation in ADLs  Pt will perform LB bathing with mod (I) and use of long-handled equipment as needed to improve participation in morning routines  Pt will perform grooming tasks with mod (I) to increase participation in ADL tasks  Pt will perform tolieting tasks with mod (I) to improve independence in ADL tasks  Pt will sit in bedside chair for all 3 meals to increase sitting/activity tolerance during ADL tasks  Pt will stand at sink with mod (I) to increase standing tolerance during all ADL tasks  Pt will follow spinal precautions 100% of the time during all ADL tasks to prevent further back injury and promote proper healing  The patient's raw score on the AM-PAC Daily Activity Inpatient Short Form is 21  A raw score of greater than or equal to 19 suggests the patient may benefit from discharge to home  Please refer to the recommendation of the Occupational Therapist for safe discharge planning  This session, pt required and most appropriately benefited from skilled OT/PT co-eval due to decreased activity tolerance and unpredictable medical and/or functional status  OT and PT goals were addressed separately as seen in documentation       SARY Arriola

## 2023-03-17 NOTE — PROGRESS NOTES
[facility]  Progress Note - Huy Swenson  1960, 58 y o  male MRN: [id number]  Unit/Bed#: [facility] 610-01 Encounter: [id number]  Primary Care Provider: Ana Lopez MD   Date and time admitted to hospital: 3/15/2023  6:43 PM    DVT prophylaxis  Assessment & Plan  - lovenox  - SCDs    Acute midline low back pain without sciatica  Assessment & Plan  - multimodal pain regimen  - scheduled tylenol, robaxin, gabapentin  - PRN oxycodone, dilaudid  - senekot, docusate bowel regimen    Urinary retention  Assessment & Plan  - no prior hx of urinary retention  - likely due to acute spinal cord compression  - urinary retention protocol    * Closed unstable burst fracture of first lumbar vertebra (HCC)  Assessment & Plan  - fall from 10-12 feet  - s/p T11-L3 pedicle fixation  - Q1 neuro checks  - OOB  - PT/OT consult      Bowel Regimen: senekot, docusate  VTE Prophylaxis:Enoxaparin (Lovenox)     Disposition: remain inpatient    Subjective   Chief Complaint: back pain    Subjective: pt reports worsening lower back pain post op  Denies lower extremity weakness, numbness, nausea, abdominal pain, or any other sxs  Unable to void spontaneously     Objective   Vitals:   Temp:  [98 °F (36 7 °C)-98 7 °F (37 1 °C)] 98 3 °F (36 8 °C)  HR:  [] 77  Resp:  [8-19] 18  BP: (124-165)/() 136/73    I/O       03/15 0701  03/16 0700 03/16 0701  03/17 0700 03/17 0701  03/18 0700    P  O   360     I V  482  5 3931 1311 3    Total Intake 482 5 4291 1311 3    Urine 1450 1575     Blood  30     Total Output 1450 1605     Net -967 5 +2686 +1311 3                  Physical Exam:   Physical Exam  General: well-appearing, no acute distress  HEENT: PERRL  Neuro: A&O x3, CN II-XII intact, no cerebellar dysfunction, motor and sensation intact throughout  CV: RRR, pulses 2+ and symmetric throughout  Pulm: CTA bilaterally, normal work of breathing  GI: abdomen soft, non-distended, non-tender, no signs of peritonitis  : deferred  MSK: ROM normal all joints, no tenderness, back exam deferred due to spinal precautions  Skin: warm and dry  Psych: calm and cooperative    Invasive Devices     Peripheral Intravenous Line  Duration           Peripheral IV 03/16/23 Left Hand <1 day    Peripheral IV 03/16/23 Right Hand <1 day                      Lab Results: Results: I have personally reviewed all pertinent laboratory/tests results  Imaging: I have personally reviewed pertinent reports       Other Studies: , desai catheter inserted

## 2023-03-18 RX ORDER — CALCIUM GLUCONATE 20 MG/ML
2 INJECTION, SOLUTION INTRAVENOUS ONCE
Status: COMPLETED | OUTPATIENT
Start: 2023-03-18 | End: 2023-03-18

## 2023-03-18 RX ORDER — CALCIUM GLUCONATE 20 MG/ML
2 INJECTION, SOLUTION INTRAVENOUS ONCE
Status: DISCONTINUED | OUTPATIENT
Start: 2023-03-18 | End: 2023-03-18

## 2023-03-18 RX ORDER — SODIUM PHOSPHATE, DIBASIC AND SODIUM PHOSPHATE, MONOBASIC 7; 19 G/133ML; G/133ML
1 ENEMA RECTAL ONCE AS NEEDED
Status: COMPLETED | OUTPATIENT
Start: 2023-03-18 | End: 2023-03-18

## 2023-03-18 RX ADMIN — METHOCARBAMOL 750 MG: 750 TABLET ORAL at 11:40

## 2023-03-18 RX ADMIN — BACITRACIN 3 LARGE APPLICATION: 500 OINTMENT TOPICAL at 08:00

## 2023-03-18 RX ADMIN — METHOCARBAMOL 750 MG: 750 TABLET ORAL at 00:16

## 2023-03-18 RX ADMIN — BACITRACIN 3 LARGE APPLICATION: 500 OINTMENT TOPICAL at 17:27

## 2023-03-18 RX ADMIN — CALCIUM GLUCONATE 2 G: 20 INJECTION, SOLUTION INTRAVENOUS at 10:28

## 2023-03-18 RX ADMIN — OXYCODONE HYDROCHLORIDE 10 MG: 10 TABLET ORAL at 00:16

## 2023-03-18 RX ADMIN — DOCUSATE SODIUM 100 MG: 100 CAPSULE, LIQUID FILLED ORAL at 17:27

## 2023-03-18 RX ADMIN — OXYCODONE HYDROCHLORIDE 10 MG: 10 TABLET ORAL at 11:40

## 2023-03-18 RX ADMIN — OXYCODONE HYDROCHLORIDE 10 MG: 10 TABLET ORAL at 19:31

## 2023-03-18 RX ADMIN — ACETAMINOPHEN 975 MG: 325 TABLET ORAL at 05:16

## 2023-03-18 RX ADMIN — METHOCARBAMOL 750 MG: 750 TABLET ORAL at 05:16

## 2023-03-18 RX ADMIN — BISACODYL 10 MG: 10 SUPPOSITORY RECTAL at 07:58

## 2023-03-18 RX ADMIN — GABAPENTIN 300 MG: 300 CAPSULE ORAL at 22:09

## 2023-03-18 RX ADMIN — SENNOSIDES 8.6 MG: 8.6 TABLET, FILM COATED ORAL at 22:09

## 2023-03-18 RX ADMIN — METHOCARBAMOL 750 MG: 750 TABLET ORAL at 17:27

## 2023-03-18 RX ADMIN — ACETAMINOPHEN 975 MG: 325 TABLET ORAL at 13:22

## 2023-03-18 RX ADMIN — METHOCARBAMOL 750 MG: 750 TABLET ORAL at 23:02

## 2023-03-18 RX ADMIN — DOCUSATE SODIUM 100 MG: 100 CAPSULE, LIQUID FILLED ORAL at 08:00

## 2023-03-18 RX ADMIN — HYDROMORPHONE HYDROCHLORIDE 0.5 MG: 1 INJECTION, SOLUTION INTRAMUSCULAR; INTRAVENOUS; SUBCUTANEOUS at 01:04

## 2023-03-18 RX ADMIN — ENOXAPARIN SODIUM 40 MG: 40 INJECTION SUBCUTANEOUS at 08:00

## 2023-03-18 RX ADMIN — GABAPENTIN 300 MG: 300 CAPSULE ORAL at 17:27

## 2023-03-18 RX ADMIN — ACETAMINOPHEN 975 MG: 325 TABLET ORAL at 22:09

## 2023-03-18 RX ADMIN — OXYCODONE HYDROCHLORIDE 10 MG: 10 TABLET ORAL at 05:16

## 2023-03-18 RX ADMIN — SODIUM PHOSPHATE 1 ENEMA: 7; 19 ENEMA RECTAL at 10:34

## 2023-03-18 RX ADMIN — GABAPENTIN 300 MG: 300 CAPSULE ORAL at 08:00

## 2023-03-18 NOTE — PLAN OF CARE
Problem: PHYSICAL THERAPY ADULT  Goal: Performs mobility at highest level of function for planned discharge setting  See evaluation for individualized goals  Description: Treatment/Interventions: Functional transfer training, LE strengthening/ROM, Elevations, Therapeutic exercise, Endurance training, Patient/family training, Equipment eval/education, Bed mobility, Gait training, Spoke to nursing, OT  Equipment Recommended: Anabelle Barnett       See flowsheet documentation for full assessment, interventions and recommendations  Outcome: Progressing  Note: Prognosis: Good  Problem List: Decreased strength, Decreased endurance, Impaired balance, Decreased mobility, Orthopedic restrictions, Pain  Assessment: Pt demosntrated improved funcitonal mobiilty today, performing transfers with decreased assist overall & ambualting up to household distances with RW, but remains well below baseline mobiilty due to acute injuries & associated pain/weakness  He initially demosntrated forward flexed posture & although heavily reliant on RW, demonstrated appropriate pace & stride length  This worsened as he fatigued, with more scissoring & degrading posture as he rturned to room  BP assessed once in room & found to be [facility], but symptoms persisted  Pt returned to bed where symptoms resolved & pt appeared more alert  He continues to require assist to don/doff brace, but verbalized understanding regarding fit  Stair trial deferred due to pt's presentation during gait  Anticipate he will progress quickly as pain & subjective symptoms improve  Pt educated on importance of mobility & was encouraged to mobilize with staff again to continue progression towards eventual home d/c    Will need to demonstrate improved gait & endurance as well as negotiate steps prior to d/c   Barriers to Discharge: Inaccessible home environment  Barriers to Discharge Comments: improved gait & stair trial  PT Discharge Recommendation: Home with outpatient rehabilitation    See flowsheet documentation for full assessment

## 2023-03-18 NOTE — PHYSICAL THERAPY NOTE
Physical Therapy Progress Note     03/18/23 1438   PT Last Visit   PT Visit Date 03/18/23   Note Type   Note Type Treatment   Pain Assessment   Pain Assessment Tool FLACC   Pain Rating: FLACC (Rest) - Face 1   Pain Rating: FLACC (Rest) - Legs 1   Pain Rating: FLACC (Rest) - Activity 1   Pain Rating: FLACC (Rest) - Cry 1   Pain Rating: FLACC (Rest) - Consolability 0   Score: FLACC (Rest) 4   Pain Rating: FLACC (Activity) - Face 2   Pain Rating: FLACC (Activity) - Legs 1   Pain Rating: FLACC (Activity) - Activity 1   Pain Rating: FLACC (Activity) - Cry 1   Pain Rating: FLACC (Activity) - Consolability 1   Score: FLACC (Activity) 6   Restrictions/Precautions   Braces or Orthoses TLSO  (backpack)   Other Precautions Pain; Fall Risk;Spinal precautions   Subjective   Subjective pt encountered supine in bed, reporting slightly improving pain overall, but is generally very fatigued due to lack of sleep  Pt became diaphoretic midway through gait trial, followed by lightheadedness once in recliner that began as he approached room  pt appeared better upon returning to bed  Bed Mobility   Rolling R 6  Modified independent   Rolling L 6  Modified independent   Supine to Sit 5  Supervision   Additional items Assist x 1;Bedrails; Increased time required  (log roll)   Sit to Supine 5  Supervision   Additional items Assist x 1; Increased time required; Bedrails   Transfers   Sit to Stand 4  Minimal assistance   Additional items Assist x 1; Armrests; Increased time required;Verbal cues   Stand to Sit 4  Minimal assistance   Additional items Assist x 1; Armrests; Increased time required;Verbal cues   Stand pivot 4  Minimal assistance   Additional items Assist x 1; Armrests; Increased time required   Ambulation/Elevation   Gait pattern Short stride; Foward flexed; Inconsistent claribel;Decreased foot clearance;Narrow ANIRUDH;Scissoring; Improper Weight shift   Gait Assistance 4  Minimal assist   Additional items Assist x 1   Assistive Device Rolling walker   Distance 50' x 2   Balance   Static Sitting Fair -   Static Standing Fair -   Ambulatory Poor +   Activity Tolerance   Activity Tolerance Patient limited by fatigue;Patient limited by pain   Nurse [address], RN   Assessment   Prognosis Good   Problem List Decreased strength;Decreased endurance; Impaired balance;Decreased mobility;Orthopedic restrictions;Pain   Assessment Pt demosntrated improved funcitonal mobiilty today, performing transfers with decreased assist overall & ambualting up to household distances with RW, but remains well below baseline mobiilty due to acute injuries & associated pain/weakness  He initially demosntrated forward flexed posture & although heavily reliant on RW, demonstrated appropriate pace & stride length  This worsened as he fatigued, with more scissoring & degrading posture as he rturned to room  BP assessed once in room & found to be [facility], but symptoms persisted  Pt returned to bed where symptoms resolved & pt appeared more alert  He continues to require assist to don/doff brace, but verbalized understanding regarding fit  Stair trial deferred due to pt's presentation during gait  Anticipate he will progress quickly as pain & subjective symptoms improve  Pt educated on importance of mobility & was encouraged to mobilize with staff again to continue progression towards eventual home d/c  Will need to demonstrate improved gait & endurance as well as negotiate steps prior to d/c    Barriers to Discharge Comments improved gait & stair trial   Goals   Patient Goals to feel better   [facility] Expiration Date 03/27/23   PT Treatment Day 1   Plan   Treatment/Interventions LE strengthening/ROM; Functional transfer training;Elevations; Therapeutic exercise; Endurance training;Patient/family training;Equipment eval/education;Gait training;Bed mobility   Progress Progressing toward goals   PT Frequency 3-5x/wk   Recommendation   PT Discharge Recommendation Home with outpatient rehabilitation   Equipment Recommended [facility] Recommended Wheeled walker   AM-PAC Basic Mobility Inpatient   Turning in Flat Bed Without Bedrails 4   Lying on Back to Sitting on Edge of Flat Bed Without Bedrails 3   Moving Bed to Chair 3   Standing Up From Chair Using Arms 3   Walk in Room 3   Climb 3-5 Stairs With Railing 2   Basic Mobility Inpatient Raw Score 18   Basic Mobility Standardized Score 41 05   Highest Level Of Mobility   JH-HLM Goal 6: Walk 10 steps or more   JH-HLM Achieved 7: Walk 25 feet or more     Portillo Gilman PTA    An [facility] Basic Mobility Raw Score less than 17 suggests pt would benefit from post acute rehab  Please also refer to the recommendation of the Physical Therapist for safe discharge planning

## 2023-03-18 NOTE — ASSESSMENT & PLAN NOTE
- fall from 10-12 feet  - s/p T11-L3 pedicle fixation  - TSLO brace: to be worn when out of bed or head of bed greater than 45 degrees anticipate patient will be wearing for approximately 6 weeks    Faisal Choudhury  - PT/OT consult  -Neurosurgery following

## 2023-03-18 NOTE — PLAN OF CARE
Problem: MOBILITY - ADULT  Goal: Maintain or return to baseline ADL function  Description: INTERVENTIONS:  -  Assess patient's ability to carry out ADLs; assess patient's baseline for ADL function and identify physical deficits which impact ability to perform ADLs (bathing, care of mouth/teeth, toileting, grooming, dressing, etc )  - Assess/evaluate cause of self-care deficits   - Assess range of motion  - Assess patient's mobility; develop plan if impaired  - Assess patient's need for assistive devices and provide as appropriate  - Encourage maximum independence but intervene and supervise when necessary  - Involve family in performance of ADLs  - Assess for home care needs following discharge   - Consider OT consult to assist with ADL evaluation and planning for discharge  - Provide patient education as appropriate  Outcome: Progressing  Goal: Maintains/Returns to pre admission functional level  Description: INTERVENTIONS:  - Perform BMAT or MOVE assessment daily    - Set and communicate daily mobility goal to care team and patient/family/caregiver  - Collaborate with rehabilitation services on mobility goals if consulted  - Perform Range of Motiion times a day  - Reposition patient every  hours    - Dangle patient  times a day  - Stand patient  times a day  - Ambulate patient  times a day  - Out of bed to chair  times a day   - Out of bed for meals  times a day  - Out of bed for toileting  - Record patient progress and toleration of activity level   Outcome: Progressing     Problem: Prexisting or High Potential for Compromised Skin Integrity  Goal: Skin integrity is maintained or improved  Description: INTERVENTIONS:  - Identify patients at risk for skin breakdown  - Assess and monitor skin integrity  - Assess and monitor nutrition and hydration status  - Monitor labs   - Assess for incontinence   - Turn and reposition patient  - Assist with mobility/ambulation  - Relieve pressure over bony prominences  - Avoid friction and shearing  - Provide appropriate hygiene as needed including keeping skin clean and dry  - Evaluate need for skin moisturizer/barrier cream  - Collaborate with interdisciplinary team   - Patient/family teaching  - Consider wound care consult   Outcome: Progressing     Problem: NEUROSENSORY - ADULT  Goal: Achieves stable or improved neurological status  Description: INTERVENTIONS  - Monitor and report changes in neurological status  - Monitor vital signs such as temperature, blood pressure, glucose, and any other labs ordered   - Initiate measures to prevent increased intracranial pressure  - Monitor for seizure activity and implement precautions if appropriate      Outcome: Progressing  Goal: Remains free of injury related to seizures activity  Description: INTERVENTIONS  - Maintain airway, patient safety  and administer oxygen as ordered  - Monitor patient for seizure activity, document and report duration and description of seizure to physician/advanced practitioner  - If seizure occurs,  ensure patient safety during seizure  - Reorient patient post seizure  - Seizure pads on all 4 side rails  - Instruct patient/family to notify RN of any seizure activity including if an aura is experienced  - Instruct patient/family to call for assistance with activity based on nursing assessment  - Administer anti-seizure medications if ordered    Outcome: Progressing  Goal: Achieves maximal functionality and self care  Description: INTERVENTIONS  - Monitor swallowing and airway patency with patient fatigue and changes in neurological status  - Encourage and assist patient to increase activity and self care     - Encourage visually impaired, hearing impaired and aphasic patients to use assistive/communication devices  Outcome: Progressing     Problem: RESPIRATORY - ADULT  Goal: Achieves optimal ventilation and oxygenation  Description: INTERVENTIONS:  - Assess for changes in respiratory status  - Assess for changes in mentation and behavior  - Position to facilitate oxygenation and minimize respiratory effort  - Oxygen administered by appropriate delivery if ordered  - Initiate smoking cessation education as indicated  - Encourage broncho-pulmonary hygiene including cough, deep breathe, Incentive Spirometry  - Assess the need for suctioning and aspirate as needed  - Assess and instruct to report SOB or any respiratory difficulty  - Respiratory Therapy support as indicated  Outcome: Progressing     Problem: GASTROINTESTINAL - ADULT  Goal: Minimal or absence of nausea and/or vomiting  Description: INTERVENTIONS:  - Administer IV fluids if ordered to ensure adequate hydration  - Maintain NPO status until nausea and vomiting are resolved  - Nasogastric tube if ordered  - Administer ordered antiemetic medications as needed  - Provide nonpharmacologic comfort measures as appropriate  - Advance diet as tolerated, if ordered  - Consider nutrition services referral to assist patient with adequate nutrition and appropriate food choices  Outcome: Progressing  Goal: Maintains or returns to baseline bowel function  Description: INTERVENTIONS:  - Assess bowel function  - Encourage oral fluids to ensure adequate hydration  - Administer IV fluids if ordered to ensure adequate hydration  - Administer ordered medications as needed  - Encourage mobilization and activity  - Consider nutritional services referral to assist patient with adequate nutrition and appropriate food choices  Outcome: Progressing  Goal: Maintains adequate nutritional intake  Description: INTERVENTIONS:  - Monitor percentage of each meal consumed  - Identify factors contributing to decreased intake, treat as appropriate  - Assist with meals as needed  - Monitor I&O, weight, and lab values if indicated  - Obtain nutrition services referral as needed  Outcome: Progressing     Problem: GENITOURINARY - ADULT  Goal: Maintains or returns to baseline urinary function  Description: INTERVENTIONS:  - Assess urinary function  - Encourage oral fluids to ensure adequate hydration if ordered  - Administer IV fluids as ordered to ensure adequate hydration  - Administer ordered medications as needed  - Offer frequent toileting  - Follow urinary retention protocol if ordered  Outcome: Progressing  Goal: Absence of urinary retention  Description: INTERVENTIONS:  - Assess patient’s ability to void and empty bladder  - Monitor I/O  - Bladder scan as needed  - Discuss with physician/AP medications to alleviate retention as needed  - Discuss catheterization for long term situations as appropriate  Outcome: Progressing     Problem: SKIN/TISSUE INTEGRITY - ADULT  Goal: Skin Integrity remains intact(Skin Breakdown Prevention)  Description: Assess:  -Perform Leandro assessment every   -Clean and moisturize skin every   -Inspect skin when repositioning, toileting, and assisting with ADLS  -Assess under medical devices such as  every  -Assess extremities for adequate circulation and sensation     Bed Management:  -Have minimal linens on bed & keep smooth, unwrinkled  -Change linens as needed when moist or perspiring  -Avoid sitting or lying in one position for more than  hours while in bed  -Keep HOB at degrees     Toileting:  -Offer bedside commode  -Assess for incontinence every   -Use incontinent care products after each incontinent episode such as    Activity:  -Mobilize patient  times a day  -Encourage activity and walks on unit  -Encourage or provide ROM exercises   -Turn and reposition patient every  Hours  -Use appropriate equipment to lift or move patient in bed  -Instruct/ Assist with weight shifting every when out of bed in chair  -Consider limitation of chair time  hour intervals    Skin Care:  -Avoid use of baby powder, tape, friction and shearing, hot water or constrictive clothing  -Relieve pressure over bony prominences using   -Do not massage red bony areas    Next Steps:  -Teach patient strategies to minimize risks such as    -Consider consults to  interdisciplinary teams such as   Outcome: Progressing  Goal: Incision(s), wounds(s) or drain site(s) healing without S/S of infection  Description: INTERVENTIONS  - Assess and document dressing, incision, wound bed, drain sites and surrounding tissue  - Provide patient and family education  - Perform skin care/dressing changes every  Outcome: Progressing

## 2023-03-18 NOTE — PROGRESS NOTES
[address] [facility]  Progress Note - Irene Scriver  1960, 58 y o  male MRN: [id number]  Unit/Bed#: [facility] 610-01 Encounter: [id number]  Primary Care Provider: Fawn Chilel MD   Date and time admitted to hospital: 3/15/2023  6:43 PM    Thrombocytopenia ([address] )  Assessment & Plan  Continue to follow     DVT prophylaxis  Assessment & Plan  - lovenox  - SCDs    Acute midline low back pain without sciatica  Assessment & Plan  - multimodal pain regimen  - scheduled tylenol, robaxin, gabapentin  - PRN oxycodone, dilaudid  - senekot, colace, dulcolax bowel regimen    Urinary retention  Assessment & Plan  - no prior hx of urinary retention  - likely due to acute spinal cord compression  - urinary retention protocol    * Closed unstable burst fracture of first lumbar vertebra (HCC)  Assessment & Plan  - fall from 10-12 feet  - s/p T11-L3 pedicle fixation  - TSLO brace: to be worn when out of bed or head of bed greater than 45 degrees anticipate patient will be wearing for approximately 6 weeks  Cole Mosher  - PT/OT consult  -Neurosurgery following      Bowel Regimen: Senokot, colace, dulcolax  VTE Prophylaxis:Enoxaparin (Lovenox)     Disposition: Continue hospitalization     Subjective   Chief Complaint: Low back pain     Subjective: Currently, patient reports banding lower back pain that is okay at rest but becomes severe with movement when he attempts to go to the bathroom  Patient also reports the feeling that he needs to make a bowel movement despite not having made 1 yet  Patient is agreeable with suppository  Patient otherwise denies pain anywhere else  Last labs checked yesterday  Patient has been afebrile and hemodynamically stable  No nursing concerns at this time  Patient has been making adequate urine output -he states that he has been making urine that has gone unmeasured       Objective   Vitals:   Temp:  [98 4 °F (36 9 °C)-99 7 °F (37 6 °C)] 99 °F (37 2 °C)  HR:  Ibis Shultz 85  Resp:  [17-18] 18  BP: (136-149)/(73-76) 140/76    I/O       03/16 0701  03/17 0700 03/17 0701  03/18 0700 03/18 0701  03/19 0700    P  O  360 200     I V  3931 1311 3     Total Intake 4291 1511 3     Urine 1575 1150     Blood 30      Total Output 1605 1150     Net +2686 +361 3            Unmeasured Urine Occurrence  1 x            Physical Exam:   GENERAL APPEARANCE: NAD  NEURO: Patient is speaking clearly in complete sentences  Patient is answering appropriately and able follow commands  Patient is moving all four extremities spontaneously  No facial droop  Tongue midline  Intact L5 and S1 motor and sensory function bilaterally  5/5 lower extremity strength bilaterally  HEENT: PERRL, Moist mucous membranes, external ears normal, nose normal  Neck: No cervical adenopathy  CV: RRR  No murmurs, rubs, gallops  LUNGS: Clear to auscultation: No wheezes, stridor, rhonchi, rales  GI: Abdomen non-distended  Soft  Non-tender and without rebound or guarding   : Deferred at this time  MSK: No deformity  Skin: Warm and dry  Capillary refill: <2 seconds    Invasive Devices     Peripheral Intravenous Line  Duration           Peripheral IV 03/16/23 Left Hand 1 day    Peripheral IV 03/16/23 Right Hand 1 day                      Lab Results: BMP/CMP: No results found for: SODIUM, K, CL, CO2, ANIONGAP, BUN, CREATININE, GLUCOSE, CALCIUM, AST, ALT, ALKPHOS, PROT, BILITOT, EGFR and CBC: No results found for: WBC, HGB, HCT, MCV, PLT, ADJUSTEDWBC, MCH, MCHC, RDW, MPV, NRBC  Imaging: I have personally reviewed pertinent reports       Other Studies: N/A

## 2023-03-18 NOTE — ASSESSMENT & PLAN NOTE
- multimodal pain regimen  - scheduled tylenol, robaxin, gabapentin  - PRN oxycodone, dilaudid  - senekot, colace, dulcolax bowel regimen

## 2023-03-18 NOTE — QUICK NOTE
Patient's wife at bedside  She and patient updated and provided with current plan    Patient reports improvement in symptoms from this morning after making a bowel movement

## 2023-03-19 ENCOUNTER — APPOINTMENT (INPATIENT)
Dept: RADIOLOGY | Facility: HOSPITAL | Age: 63
End: 2023-03-19

## 2023-03-19 PROBLEM — R09.02 HYPOXIA: Status: ACTIVE | Noted: 2023-03-19

## 2023-03-19 LAB
ANION GAP SERPL CALCULATED.3IONS-SCNC: 4 MMOL/L (ref 4–13)
BACTERIA UR QL AUTO: ABNORMAL /HPF
BILIRUB UR QL STRIP: NEGATIVE
BUN SERPL-MCNC: 17 MG/DL (ref 5–25)
CALCIUM SERPL-MCNC: 8 MG/DL (ref 8.3–10.1)
CHLORIDE SERPL-SCNC: 100 MMOL/L (ref 96–108)
CLARITY UR: CLEAR
CO2 SERPL-SCNC: 30 MMOL/L (ref 21–32)
COLOR UR: YELLOW
CREAT SERPL-MCNC: 0.7 MG/DL (ref 0.6–1.3)
ERYTHROCYTE [DISTWIDTH] IN BLOOD BY AUTOMATED COUNT: 11.9 % (ref 11.6–15.1)
FLUAV RNA RESP QL NAA+PROBE: NEGATIVE
FLUBV RNA RESP QL NAA+PROBE: NEGATIVE
GFR SERPL CREATININE-BSD FRML MDRD: 101 ML/MIN/1.73SQ M
GLUCOSE SERPL-MCNC: 139 MG/DL (ref 65–140)
GLUCOSE UR STRIP-MCNC: NEGATIVE MG/DL
HCT VFR BLD AUTO: 35.5 % (ref 36.5–49.3)
HGB BLD-MCNC: 12.9 G/DL (ref 12–17)
HGB UR QL STRIP.AUTO: NEGATIVE
KETONES UR STRIP-MCNC: NEGATIVE MG/DL
LEUKOCYTE ESTERASE UR QL STRIP: NEGATIVE
MCH RBC QN AUTO: 32.7 PG (ref 26.8–34.3)
MCHC RBC AUTO-ENTMCNC: 36.3 G/DL (ref 31.4–37.4)
MCV RBC AUTO: 90 FL (ref 82–98)
MUCOUS THREADS UR QL AUTO: ABNORMAL
NITRITE UR QL STRIP: NEGATIVE
NON-SQ EPI CELLS URNS QL MICRO: ABNORMAL /HPF
PH UR STRIP.AUTO: 6.5 [PH]
PLATELET # BLD AUTO: 103 THOUSANDS/UL (ref 149–390)
PMV BLD AUTO: 11.1 FL (ref 8.9–12.7)
POTASSIUM SERPL-SCNC: 3.3 MMOL/L (ref 3.5–5.3)
PROT UR STRIP-MCNC: NEGATIVE MG/DL
RBC # BLD AUTO: 3.95 MILLION/UL (ref 3.88–5.62)
RBC #/AREA URNS AUTO: ABNORMAL /HPF
RSV RNA RESP QL NAA+PROBE: NEGATIVE
SARS-COV-2 RNA RESP QL NAA+PROBE: NEGATIVE
SODIUM SERPL-SCNC: 134 MMOL/L (ref 135–147)
SP GR UR STRIP.AUTO: 1.02 (ref 1–1.03)
UROBILINOGEN UR STRIP-ACNC: 2 MG/DL
WBC # BLD AUTO: 9.25 THOUSAND/UL (ref 4.31–10.16)
WBC #/AREA URNS AUTO: ABNORMAL /HPF

## 2023-03-19 RX ORDER — POTASSIUM CHLORIDE 14.9 MG/ML
20 INJECTION INTRAVENOUS
Status: COMPLETED | OUTPATIENT
Start: 2023-03-19 | End: 2023-03-20

## 2023-03-19 RX ORDER — POTASSIUM CHLORIDE 20 MEQ/1
40 TABLET, EXTENDED RELEASE ORAL ONCE
Status: COMPLETED | OUTPATIENT
Start: 2023-03-19 | End: 2023-03-19

## 2023-03-19 RX ADMIN — METHOCARBAMOL 750 MG: 750 TABLET ORAL at 17:52

## 2023-03-19 RX ADMIN — ACETAMINOPHEN 975 MG: 325 TABLET ORAL at 21:52

## 2023-03-19 RX ADMIN — METHOCARBAMOL 750 MG: 750 TABLET ORAL at 05:36

## 2023-03-19 RX ADMIN — SENNOSIDES 8.6 MG: 8.6 TABLET, FILM COATED ORAL at 21:52

## 2023-03-19 RX ADMIN — HYDROMORPHONE HYDROCHLORIDE 0.5 MG: 1 INJECTION, SOLUTION INTRAMUSCULAR; INTRAVENOUS; SUBCUTANEOUS at 03:49

## 2023-03-19 RX ADMIN — POTASSIUM CHLORIDE 20 MEQ: 14.9 INJECTION, SOLUTION INTRAVENOUS at 22:04

## 2023-03-19 RX ADMIN — GABAPENTIN 300 MG: 300 CAPSULE ORAL at 09:28

## 2023-03-19 RX ADMIN — METHOCARBAMOL 750 MG: 750 TABLET ORAL at 23:38

## 2023-03-19 RX ADMIN — POTASSIUM CHLORIDE 40 MEQ: 1500 TABLET, EXTENDED RELEASE ORAL at 21:59

## 2023-03-19 RX ADMIN — ENOXAPARIN SODIUM 40 MG: 40 INJECTION SUBCUTANEOUS at 09:28

## 2023-03-19 RX ADMIN — BACITRACIN 3 LARGE APPLICATION: 500 OINTMENT TOPICAL at 17:52

## 2023-03-19 RX ADMIN — OXYCODONE HYDROCHLORIDE 10 MG: 10 TABLET ORAL at 09:32

## 2023-03-19 RX ADMIN — GABAPENTIN 300 MG: 300 CAPSULE ORAL at 21:52

## 2023-03-19 RX ADMIN — OXYCODONE HYDROCHLORIDE 10 MG: 10 TABLET ORAL at 13:55

## 2023-03-19 RX ADMIN — OXYCODONE HYDROCHLORIDE 10 MG: 10 TABLET ORAL at 23:38

## 2023-03-19 RX ADMIN — OXYCODONE HYDROCHLORIDE 10 MG: 10 TABLET ORAL at 03:28

## 2023-03-19 RX ADMIN — BACITRACIN 3 LARGE APPLICATION: 500 OINTMENT TOPICAL at 09:29

## 2023-03-19 RX ADMIN — METHOCARBAMOL 750 MG: 750 TABLET ORAL at 12:04

## 2023-03-19 RX ADMIN — DOCUSATE SODIUM 100 MG: 100 CAPSULE, LIQUID FILLED ORAL at 09:33

## 2023-03-19 RX ADMIN — ACETAMINOPHEN 975 MG: 325 TABLET ORAL at 13:55

## 2023-03-19 RX ADMIN — GABAPENTIN 300 MG: 300 CAPSULE ORAL at 17:52

## 2023-03-19 RX ADMIN — OXYCODONE HYDROCHLORIDE 10 MG: 10 TABLET ORAL at 19:05

## 2023-03-19 RX ADMIN — DOCUSATE SODIUM 100 MG: 100 CAPSULE, LIQUID FILLED ORAL at 17:52

## 2023-03-19 RX ADMIN — ACETAMINOPHEN 975 MG: 325 TABLET ORAL at 05:36

## 2023-03-19 NOTE — PLAN OF CARE
Problem: PHYSICAL THERAPY ADULT  Goal: Performs mobility at highest level of function for planned discharge setting  See evaluation for individualized goals  Description: Treatment/Interventions: Functional transfer training, LE strengthening/ROM, Elevations, Therapeutic exercise, Endurance training, Patient/family training, Equipment eval/education, Bed mobility, Gait training, Spoke to nursing, OT  Equipment Recommended: Andres Argueta       See flowsheet documentation for full assessment, interventions and recommendations  Outcome: Progressing  Note: Prognosis: Good  Problem List: Decreased strength, Decreased endurance, Impaired balance, Decreased mobility, Orthopedic restrictions, Pain  Assessment: Pt continues to perform all aspects of bed mobilty without need for assist and maintain balance EOB without incident  He then performs OOB mobiitly primarily with mn A for guarding, but no LOB or significant unsteadiness noted today compared to yesterday  pt progressed to stair trial, and did so without incident, but is reliant on UEs for assist   Pt reports not having railiing at this time, but can arrange for someone to install something prior to d/c  Upon return to room, pt attempted sitting in reclienr, but subjective symptoms worsened in similar manner to yesterday despite stable vitals, so pt was returned to bed as precaution where symptoms resolved  Pt encouraged to continue sitting EOB & attempting OOB mobiilty with staff as able to maximize tolerance to positional changes & ensure he can maintain functional independence with these tasks upon return home  From mobility perspective, ivan pt will be able to d/c home with family support & follow up PT services once subjective pain & related symptoms resolve  PT will continue to follow and treat at this time to ensure safe transition at that time    Barriers to Discharge: Inaccessible home environment  Barriers to Discharge Comments: improved gait & stair trial  PT Discharge Recommendation: Home with outpatient rehabilitation    See flowsheet documentation for full assessment

## 2023-03-19 NOTE — ASSESSMENT & PLAN NOTE
- fall from 10-12 feet  - s/p T11-L3 pedicle fixation  - TSLO brace: to be worn when out of bed or head of bed greater than 45 degrees anticipate patient will be wearing for approximately 6 weeks    Yogi Hair  - PT/OT consult  -Neurosurgery following

## 2023-03-19 NOTE — QUICK NOTE
Was reached out by trauma resident this morning in regards to patient retaining urine  Bladder scan for greater than 450 mL  Checked with nurse later this morning around 10 AM patient was able to void without difficulty 400 mL  His exam remains the same strength 5/5 throughout and pelvis pain resolved  Continue TLSO brace when out of bed or head of bed greater than 45 degrees  Pain control and medical management per trauma team   Neurosurgery will sign off, patient will follow-up in 2 weeks for incision check in 6 weeks for postoperative visit with repeat upright x-rays, call with any further questions or concerns

## 2023-03-19 NOTE — ASSESSMENT & PLAN NOTE
- multimodal pain regimen  - scheduled tylenol, robaxin, gabapentin  - PRN oxycodone, dilaudid  - senokot, colace, dulcolax bowel regimen

## 2023-03-19 NOTE — PROGRESS NOTES
[address] [facility]  Progress Note - Virginia Line  1960, 58 y o  male MRN: [id number]  Unit/Bed#: [facility] 610-01 Encounter: [id number]  Primary Care Provider: Jose Luciano MD   Date and time admitted to hospital: 3/15/2023  6:43 PM    Hypoxia  Assessment & Plan  Patient noted to be hypoxic to 87% on room air in the evening on 3/18 - Patient denies history of asthma, FRIDA, COPD  Patient denies cough and lungs clear at that time; however, patient noted to be febrile that evening as well    Follow up CXR, viral testing     [facility] )  Assessment & Plan  Continue to follow     DVT prophylaxis  Assessment & Plan  - lovenox  - SCDs    Acute midline low back pain without sciatica  Assessment & Plan  - multimodal pain regimen  - scheduled tylenol, robaxin, gabapentin  - PRN oxycodone, dilaudid  - senokot, colace, dulcolax bowel regimen    Urinary retention  Assessment & Plan  - no prior hx of urinary retention  - likely due to acute spinal cord compression  - urinary retention protocol  -3/19 - Pelvic pressure, bladder scan >450cc    -Follow up UA and labs   -3/19 - NS had signed off on 3/17 but the note states that he was voiding without difficulty  This seems to have been the case through 3/18; however, patient is currently complaining of pelvic pressure and his bladder scan is >450cc  Neurosurgery made aware via tiger text    * Closed unstable burst fracture of first lumbar vertebra ([facility] [address] )  Assessment & Plan  - fall from 10-12 feet  - s/p T11-L3 pedicle fixation  - TSLO brace: to be worn when out of bed or head of bed greater than 45 degrees anticipate patient will be wearing for approximately 6 weeks    [facility]  - PT/OT consult  -Neurosurgery following      Bowel Regimen: colace, senokot  VTE Prophylaxis:Enoxaparin (Lovenox)     Disposition: Continue hospitalization     Subjective   Chief Complaint: Pelvic pain/pressure    Subjective: Overnight, patient noted to be hypoxic to 87% on RA - Patient denied cough/chest pain/chills/dyspnea/feeling feverish, other associated symptoms at that time  Noted to be febrile later in the evening  Per nursing, once the abnormal value was brought to her attention, the temperature was rechecked and it was afebrile  Patient reports improvement in his abdominal discomfort after enema and BM on 3/18  But now reports the development of a anterior, non-radiating pelvic pain characterized as pressure  Bladder scan > 450cc at bedside  There is no associated nausea or vomiting or penile discharge  (TWIST Score 0)  Patient agreeable with attempting to void to see if he has improved symptoms and catheterization if necessary  Patient otherwise hemodynamically stable and no longer febrile   Labs last checked 3/17  NS had signed off on 3/17 but the note states that he was voiding without difficulty  This seems to have been the case through 3/18; however, patient is currently complaining of pelvic pressure and his bladder scan is >450cc  I discussed with neurosurgery via tiger text to Sadaf Dumont - she will discuss with the attending later today  Objective   Vitals:   Temp:  [97 1 °F (36 2 °C)-101 8 °F (38 8 °C)] 99 2 °F (37 3 °C)  HR:  [85-97] 92  Resp:  [16-18] 18  BP: (125-140)/(76-89) 139/82    I/O       03/17 0701  03/18 0700 03/18 0701  03/19 0700    P  O  200     I V  1311 3     IV Piggyback  100    Total Intake 1511 3 100    Urine 1150 1150    Total Output 1150 1150    Net +361 3 -1050          Unmeasured Urine Occurrence 1 x 1 x    Unmeasured Stool Occurrence  1 x           Physical Exam:   GENERAL APPEARANCE: NAD  NEURO: Patient is speaking clearly in complete sentences  Patient is answering appropriately and able follow commands  Patient is moving all four extremities spontaneously  No facial droop  Tongue midline  Intact L5 and S1 motor and sensory function bilaterally    Patient moving his lower extremities actively laterally  HEENT: PERRL, Moist mucous membranes, external ears normal, nose normal  Neck: No cervical adenopathy  CV: RRR  No murmurs, rubs, gallops  LUNGS: Clear to auscultation: No wheezes, stridor, rhonchi, rales  GI: Slight distention in the suprapubic region  Soft  Non-tender and without rebound or guarding   : Nursing chaperone present  No testicular swelling, no testicular pain to palpation, no testicular abnormal lie, cremasteric reflex intact bilaterally  MSK: No deformity  Incision sites on back without surrounding erythema or signs of discharge  Skin: Warm and dry  Capillary refill: <2 seconds    Invasive Devices     Peripheral Intravenous Line  Duration           Peripheral IV 03/16/23 Left Hand 2 days    Peripheral IV 03/16/23 Right Hand 2 days                      Lab Results: BMP/CMP: No results found for: SODIUM, K, CL, CO2, ANIONGAP, BUN, CREATININE, GLUCOSE, CALCIUM, AST, ALT, ALKPHOS, PROT, BILITOT, EGFR and CBC: No results found for: WBC, HGB, HCT, MCV, PLT, ADJUSTEDWBC, MCH, MCHC, RDW, MPV, NRBC  Imaging: I have personally reviewed pertinent reports       Other Studies: N/A

## 2023-03-19 NOTE — ASSESSMENT & PLAN NOTE
- no prior hx of urinary retention  - likely due to acute spinal cord compression  - urinary retention protocol  -3/19 - Pelvic pressure, bladder scan >450cc    -Follow up UA and labs   -3/19 - NS had signed off on 3/17 but the note states that he was voiding without difficulty  This seems to have been the case through 3/18; however, patient is currently complaining of pelvic pressure and his bladder scan is >450cc   Neurosurgery made aware via tiger text

## 2023-03-19 NOTE — PHYSICAL THERAPY NOTE
Physical Therapy Progress Note     03/19/23 1508   PT Last Visit   PT Visit Date 03/19/23   Note Type   Note Type Treatment   Pain Assessment   Pain Assessment Tool FLACC   Pain Rating: FLACC (Rest) - Face 1   Pain Rating: FLACC (Rest) - Legs 0   Pain Rating: FLACC (Rest) - Activity 1   Pain Rating: FLACC (Rest) - Cry 1   Pain Rating: FLACC (Rest) - Consolability 0   Score: FLACC (Rest) 3   Pain Rating: FLACC (Activity) - Face 2   Pain Rating: FLACC (Activity) - Legs 1   Pain Rating: FLACC (Activity) - Activity 1   Pain Rating: FLACC (Activity) - Cry 2   Pain Rating: FLACC (Activity) - Consolability 1   Score: FLACC (Activity) 7   Restrictions/Precautions   Braces or Orthoses TLSO  (backpack)   Other Precautions Pain; Fall Risk;Spinal precautions   Subjective   Subjective Pt encountered supine in bed, pleasant and agreeable to treatment  Reports improving back pain since this AM, but also has newer groin pain in testicles, but denies swelling or shooting pain from spine  Pt reports no major change during activity, but does report worsening diaphoresis & nausea upon sitting, which resided once supine in bed  Bed Mobility   Rolling R 6  Modified independent   Additional items Assist x 1;Bedrails   Rolling L 6  Modified independent   Additional items Assist x 1;Bedrails   Supine to Sit 6  Modified independent   Additional items Bedrails   Sit to Supine 6  Modified independent   Additional items Bedrails   Transfers   Sit to Stand 4  Minimal assistance   Additional items Assist x 1; Armrests; Increased time required   Stand to Sit 4  Minimal assistance   Additional items Assist x 1   Ambulation/Elevation   Gait pattern Short stride; Foward flexed; Inconsistent claribel;Decreased foot clearance;Narrow ANIRUDH; Improper Weight shift   Gait Assistance 4  Minimal assist   Additional items Assist x 1   Assistive Device Rolling walker   Distance 25', 50', 25'   Stair Management Assistance 4  Minimal assist   Additional items Assist x 1   Stair Management Technique One rail R;Step to pattern; Sideways   Number of Stairs 3   Balance   Static Sitting Fair   Static Standing Fair -   Ambulatory Poor +   Activity Tolerance   Activity Tolerance Patient tolerated treatment well;Patient limited by fatigue;Patient limited by pain   Nurse [address] [place] [address], RN   Assessment   Prognosis Good   Problem List Decreased strength;Decreased endurance; Impaired balance;Decreased mobility;Orthopedic restrictions;Pain   Assessment Pt continues to perform all aspects of bed mobilty without need for assist and maintain balance EOB without incident  He then performs OOB mobiitly primarily with mn A for guarding, but no LOB or significant unsteadiness noted today compared to yesterday  pt progressed to stair trial, and did so without incident, but is reliant on UEs for assist   Pt reports not having railiing at this time, but can arrange for someone to install something prior to d/c  Upon return to room, pt attempted sitting in reclienr, but subjective symptoms worsened in similar manner to yesterday despite stable vitals, so pt was returned to bed as precaution where symptoms resolved  Pt encouraged to continue sitting EOB & attempting OOB mobiilty with staff as able to maximize tolerance to positional changes & ensure he can maintain functional independence with these tasks upon return home  From mobility perspective, ivan pt will be able to d/c home with family support & follow up PT services once subjective pain & related symptoms resolve  PT will continue to follow and treat at this time to ensure safe transition at that time  Goals   Patient Goals to feel better   STG Expiration Date 03/27/23   PT Treatment Day 2   Plan   Treatment/Interventions LE strengthening/ROM; Functional transfer training;Elevations; Therapeutic exercise; Endurance training;Patient/family training;Equipment eval/education;Gait training;Bed mobility   Progress Progressing toward goals PT Frequency 3-5x/wk   Recommendation   PT Discharge Recommendation Home with outpatient rehabilitation   Equipment Recommended [facility] Recommended Wheeled walker   AM-PAC Basic Mobility Inpatient   Turning in Flat Bed Without Bedrails 4   Lying on Back to Sitting on Edge of Flat Bed Without Bedrails 4   Moving Bed to Chair 3   Standing Up From Chair Using Arms 3   Walk in Room 3   Climb 3-5 Stairs With Railing 3   Basic Mobility Inpatient Raw Score 20   Basic Mobility Standardized Score 43 99   Highest Level Of Mobility   JH-HLM Goal 6: Walk 10 steps or more   JH-HLM Achieved 7: Walk 25 feet or more     Maria Fernanda Hernandez PTA    An [facility] Basic Mobility Raw Score less than 17 suggests pt would benefit from post acute rehab  Please also refer to the recommendation of the Physical Therapist for safe discharge planning

## 2023-03-19 NOTE — PLAN OF CARE
Problem: MOBILITY - ADULT  Goal: Maintain or return to baseline ADL function  Description: INTERVENTIONS:  -  Assess patient's ability to carry out ADLs; assess patient's baseline for ADL function and identify physical deficits which impact ability to perform ADLs (bathing, care of mouth/teeth, toileting, grooming, dressing, etc )  - Assess/evaluate cause of self-care deficits   - Assess range of motion  - Assess patient's mobility; develop plan if impaired  - Assess patient's need for assistive devices and provide as appropriate  - Encourage maximum independence but intervene and supervise when necessary  - Involve family in performance of ADLs  - Assess for home care needs following discharge   - Consider OT consult to assist with ADL evaluation and planning for discharge  - Provide patient education as appropriate  Outcome: Progressing  Goal: Maintains/Returns to pre admission functional level  Description: INTERVENTIONS:  - Perform BMAT or MOVE assessment daily    - Set and communicate daily mobility goal to care team and patient/family/caregiver  - Collaborate with rehabilitation services on mobility goals if consulted  - Perform Range of Motion times a day  - Reposition patient every  hours    - Dangle patient times a day  - Stand patient times a day  - Ambulate patient  times a day  - Out of bed to chair  times a day   - Out of bed for meals  times a day  - Out of bed for toileting  - Record patient progress and toleration of activity level   Outcome: Progressing     Problem: Prexisting or High Potential for Compromised Skin Integrity  Goal: Skin integrity is maintained or improved  Description: INTERVENTIONS:  - Identify patients at risk for skin breakdown  - Assess and monitor skin integrity  - Assess and monitor nutrition and hydration status  - Monitor labs   - Assess for incontinence   - Turn and reposition patient  - Assist with mobility/ambulation  - Relieve pressure over bony prominences  - Avoid friction and shearing  - Provide appropriate hygiene as needed including keeping skin clean and dry  - Evaluate need for skin moisturizer/barrier cream  - Collaborate with interdisciplinary team   - Patient/family teaching  - Consider wound care consult   Outcome: Progressing     Problem: NEUROSENSORY - ADULT  Goal: Achieves stable or improved neurological status  Description: INTERVENTIONS  - Monitor and report changes in neurological status  - Monitor vital signs such as temperature, blood pressure, glucose, and any other labs ordered   - Initiate measures to prevent increased intracranial pressure  - Monitor for seizure activity and implement precautions if appropriate      Outcome: Progressing  Goal: Remains free of injury related to seizures activity  Description: INTERVENTIONS  - Maintain airway, patient safety  and administer oxygen as ordered  - Monitor patient for seizure activity, document and report duration and description of seizure to physician/advanced practitioner  - If seizure occurs,  ensure patient safety during seizure  - Reorient patient post seizure  - Seizure pads on all 4 side rails  - Instruct patient/family to notify RN of any seizure activity including if an aura is experienced  - Instruct patient/family to call for assistance with activity based on nursing assessment  - Administer anti-seizure medications if ordered    Outcome: Progressing  Goal: Achieves maximal functionality and self care  Description: INTERVENTIONS  - Monitor swallowing and airway patency with patient fatigue and changes in neurological status  - Encourage and assist patient to increase activity and self care     - Encourage visually impaired, hearing impaired and aphasic patients to use assistive/communication devices  Outcome: Progressing     Problem: GASTROINTESTINAL - ADULT  Goal: Minimal or absence of nausea and/or vomiting  Description: INTERVENTIONS:  - Administer IV fluids if ordered to ensure adequate hydration  - Maintain NPO status until nausea and vomiting are resolved  - Nasogastric tube if ordered  - Administer ordered antiemetic medications as needed  - Provide nonpharmacologic comfort measures as appropriate  - Advance diet as tolerated, if ordered  - Consider nutrition services referral to assist patient with adequate nutrition and appropriate food choices  Outcome: Progressing  Goal: Maintains or returns to baseline bowel function  Description: INTERVENTIONS:  - Assess bowel function  - Encourage oral fluids to ensure adequate hydration  - Administer IV fluids if ordered to ensure adequate hydration  - Administer ordered medications as needed  - Encourage mobilization and activity  - Consider nutritional services referral to assist patient with adequate nutrition and appropriate food choices  Outcome: Progressing  Goal: Maintains adequate nutritional intake  Description: INTERVENTIONS:  - Monitor percentage of each meal consumed  - Identify factors contributing to decreased intake, treat as appropriate  - Assist with meals as needed  - Monitor I&O, weight, and lab values if indicated  - Obtain nutrition services referral as needed  Outcome: Progressing     Problem: GENITOURINARY - ADULT  Goal: Maintains or returns to baseline urinary function  Description: INTERVENTIONS:  - Assess urinary function  - Encourage oral fluids to ensure adequate hydration if ordered  - Administer IV fluids as ordered to ensure adequate hydration  - Administer ordered medications as needed  - Offer frequent toileting  - Follow urinary retention protocol if ordered  Outcome: Progressing  Goal: Absence of urinary retention  Description: INTERVENTIONS:  - Assess patient’s ability to void and empty bladder  - Monitor I/O  - Bladder scan as needed  - Discuss with physician/AP medications to alleviate retention as needed  - Discuss catheterization for long term situations as appropriate  Outcome: Progressing     Problem: SKIN/TISSUE INTEGRITY - ADULT  Goal: Skin Integrity remains intact(Skin Breakdown Prevention)  Description: Assess:  -Perform Leandro assessment every  -Clean and moisturize skin every   -Inspect skin when repositioning, toileting, and assisting with ADLS  -Assess under medical devices such as  every   -Assess extremities for adequate circulation and sensation     Bed Management:  -Have minimal linens on bed & keep smooth, unwrinkled  -Change linens as needed when moist or perspiring  -Avoid sitting or lying in one position for more than  hours while in bed  -Keep HOB at degrees     Toileting:  -Offer bedside commode  -Assess for incontinence every   -Use incontinent care products after each incontinent episode such as     Activity:  -Mobilize patient  times a day  -Encourage activity and walks on unit  -Encourage or provide ROM exercises   -Turn and reposition patient every Hours  -Use appropriate equipment to lift or move patient in bed  -Instruct/ Assist with weight shifting every  when out of bed in chair  -Consider limitation of chair time  hour intervals    Skin Care:  -Avoid use of baby powder, tape, friction and shearing, hot water or constrictive clothing  -Relieve pressure over bony prominences using   -Do not massage red bony areas    Next Steps:  -Teach patient strategies to minimize risks such as    -Consider consults to  interdisciplinary teams such as   Outcome: Progressing  Goal: Incision(s), wounds(s) or drain site(s) healing without S/S of infection  Description: INTERVENTIONS  - Assess and document dressing, incision, wound bed, drain sites and surrounding tissue  - Provide patient and family education  - Perform skin care/dressing changes every   Outcome: Progressing

## 2023-03-19 NOTE — ASSESSMENT & PLAN NOTE
Patient noted to be hypoxic to 87% on room air in the evening on 3/18 - Patient denies history of asthma, FRIDA, COPD  Patient denies cough and lungs clear at that time; however, patient noted to be febrile that evening as well    Follow up CXR, viral testing

## 2023-03-20 LAB
ANION GAP SERPL CALCULATED.3IONS-SCNC: 5 MMOL/L (ref 4–13)
BASOPHILS # BLD AUTO: 0.02 THOUSANDS/ÂΜL (ref 0–0.1)
BASOPHILS NFR BLD AUTO: 0 % (ref 0–1)
BUN SERPL-MCNC: 20 MG/DL (ref 5–25)
CA-I BLD-SCNC: 1.09 MMOL/L (ref 1.12–1.32)
CALCIUM SERPL-MCNC: 8.6 MG/DL (ref 8.3–10.1)
CHLORIDE SERPL-SCNC: 99 MMOL/L (ref 96–108)
CO2 SERPL-SCNC: 29 MMOL/L (ref 21–32)
CREAT SERPL-MCNC: 0.69 MG/DL (ref 0.6–1.3)
EOSINOPHIL # BLD AUTO: 0.12 THOUSAND/ÂΜL (ref 0–0.61)
EOSINOPHIL NFR BLD AUTO: 1 % (ref 0–6)
ERYTHROCYTE [DISTWIDTH] IN BLOOD BY AUTOMATED COUNT: 12 % (ref 11.6–15.1)
GFR SERPL CREATININE-BSD FRML MDRD: 101 ML/MIN/1.73SQ M
GLUCOSE SERPL-MCNC: 109 MG/DL (ref 65–140)
HCT VFR BLD AUTO: 40 % (ref 36.5–49.3)
HGB BLD-MCNC: 13.8 G/DL (ref 12–17)
IMM GRANULOCYTES # BLD AUTO: 0.03 THOUSAND/UL (ref 0–0.2)
IMM GRANULOCYTES NFR BLD AUTO: 0 % (ref 0–2)
LYMPHOCYTES # BLD AUTO: 0.67 THOUSANDS/ÂΜL (ref 0.6–4.47)
LYMPHOCYTES NFR BLD AUTO: 7 % (ref 14–44)
MAGNESIUM SERPL-MCNC: 2.5 MG/DL (ref 1.6–2.6)
MCH RBC QN AUTO: 31.7 PG (ref 26.8–34.3)
MCHC RBC AUTO-ENTMCNC: 34.5 G/DL (ref 31.4–37.4)
MCV RBC AUTO: 92 FL (ref 82–98)
MONOCYTES # BLD AUTO: 0.94 THOUSAND/ÂΜL (ref 0.17–1.22)
MONOCYTES NFR BLD AUTO: 10 % (ref 4–12)
NEUTROPHILS # BLD AUTO: 8.03 THOUSANDS/ÂΜL (ref 1.85–7.62)
NEUTS SEG NFR BLD AUTO: 82 % (ref 43–75)
NRBC BLD AUTO-RTO: 0 /100 WBCS
PLATELET # BLD AUTO: 173 THOUSANDS/UL (ref 149–390)
PMV BLD AUTO: 10.6 FL (ref 8.9–12.7)
POTASSIUM SERPL-SCNC: 3.8 MMOL/L (ref 3.5–5.3)
RBC # BLD AUTO: 4.36 MILLION/UL (ref 3.88–5.62)
SODIUM SERPL-SCNC: 133 MMOL/L (ref 135–147)
WBC # BLD AUTO: 9.81 THOUSAND/UL (ref 4.31–10.16)

## 2023-03-20 RX ORDER — OXYCODONE HYDROCHLORIDE 10 MG/1
10 TABLET ORAL EVERY 4 HOURS PRN
Status: DISCONTINUED | OUTPATIENT
Start: 2023-03-20 | End: 2023-03-21 | Stop reason: HOSPADM

## 2023-03-20 RX ORDER — POLYETHYLENE GLYCOL 3350 17 G/17G
17 POWDER, FOR SOLUTION ORAL DAILY PRN
Status: DISCONTINUED | OUTPATIENT
Start: 2023-03-20 | End: 2023-03-21 | Stop reason: HOSPADM

## 2023-03-20 RX ORDER — ENOXAPARIN SODIUM 100 MG/ML
30 INJECTION SUBCUTANEOUS EVERY 12 HOURS SCHEDULED
Status: DISCONTINUED | OUTPATIENT
Start: 2023-03-21 | End: 2023-03-21 | Stop reason: HOSPADM

## 2023-03-20 RX ORDER — OXYCODONE HYDROCHLORIDE 5 MG/1
5 TABLET ORAL EVERY 4 HOURS
Status: DISCONTINUED | OUTPATIENT
Start: 2023-03-20 | End: 2023-03-21

## 2023-03-20 RX ORDER — DIAZEPAM 2 MG/1
2 TABLET ORAL EVERY 6 HOURS PRN
Status: DISCONTINUED | OUTPATIENT
Start: 2023-03-20 | End: 2023-03-21 | Stop reason: HOSPADM

## 2023-03-20 RX ADMIN — BACITRACIN 3 LARGE APPLICATION: 500 OINTMENT TOPICAL at 08:14

## 2023-03-20 RX ADMIN — BACITRACIN 3 LARGE APPLICATION: 500 OINTMENT TOPICAL at 17:48

## 2023-03-20 RX ADMIN — HYDROMORPHONE HYDROCHLORIDE 0.5 MG: 1 INJECTION, SOLUTION INTRAMUSCULAR; INTRAVENOUS; SUBCUTANEOUS at 08:13

## 2023-03-20 RX ADMIN — OXYCODONE HYDROCHLORIDE 5 MG: 5 TABLET ORAL at 15:31

## 2023-03-20 RX ADMIN — OXYCODONE HYDROCHLORIDE 5 MG: 5 TABLET ORAL at 11:22

## 2023-03-20 RX ADMIN — OXYCODONE HYDROCHLORIDE 10 MG: 10 TABLET ORAL at 22:18

## 2023-03-20 RX ADMIN — DOCUSATE SODIUM 100 MG: 100 CAPSULE, LIQUID FILLED ORAL at 08:13

## 2023-03-20 RX ADMIN — OXYCODONE HYDROCHLORIDE 10 MG: 10 TABLET ORAL at 05:25

## 2023-03-20 RX ADMIN — METHOCARBAMOL 750 MG: 750 TABLET ORAL at 11:22

## 2023-03-20 RX ADMIN — BISACODYL 10 MG: 10 SUPPOSITORY RECTAL at 12:33

## 2023-03-20 RX ADMIN — OXYCODONE HYDROCHLORIDE 10 MG: 10 TABLET ORAL at 13:55

## 2023-03-20 RX ADMIN — ACETAMINOPHEN 975 MG: 325 TABLET ORAL at 13:55

## 2023-03-20 RX ADMIN — OXYCODONE HYDROCHLORIDE 10 MG: 10 TABLET ORAL at 17:55

## 2023-03-20 RX ADMIN — OXYCODONE HYDROCHLORIDE 10 MG: 10 TABLET ORAL at 09:52

## 2023-03-20 RX ADMIN — SENNOSIDES 8.6 MG: 8.6 TABLET, FILM COATED ORAL at 21:21

## 2023-03-20 RX ADMIN — GABAPENTIN 300 MG: 300 CAPSULE ORAL at 08:13

## 2023-03-20 RX ADMIN — DOCUSATE SODIUM 100 MG: 100 CAPSULE, LIQUID FILLED ORAL at 17:48

## 2023-03-20 RX ADMIN — METHOCARBAMOL 750 MG: 750 TABLET ORAL at 23:32

## 2023-03-20 RX ADMIN — GABAPENTIN 300 MG: 300 CAPSULE ORAL at 21:21

## 2023-03-20 RX ADMIN — GABAPENTIN 300 MG: 300 CAPSULE ORAL at 17:48

## 2023-03-20 RX ADMIN — CALCIUM CARBONATE (ANTACID) CHEW TAB 500 MG 1000 MG: 500 CHEW TAB at 14:13

## 2023-03-20 RX ADMIN — ACETAMINOPHEN 975 MG: 325 TABLET ORAL at 05:25

## 2023-03-20 RX ADMIN — OXYCODONE HYDROCHLORIDE 5 MG: 5 TABLET ORAL at 19:43

## 2023-03-20 RX ADMIN — METHOCARBAMOL 750 MG: 750 TABLET ORAL at 05:25

## 2023-03-20 RX ADMIN — METHOCARBAMOL 750 MG: 750 TABLET ORAL at 17:48

## 2023-03-20 RX ADMIN — OXYCODONE HYDROCHLORIDE 5 MG: 5 TABLET ORAL at 23:32

## 2023-03-20 RX ADMIN — POTASSIUM CHLORIDE 20 MEQ: 14.9 INJECTION, SOLUTION INTRAVENOUS at 00:44

## 2023-03-20 RX ADMIN — ENOXAPARIN SODIUM 40 MG: 40 INJECTION SUBCUTANEOUS at 08:13

## 2023-03-20 RX ADMIN — ACETAMINOPHEN 975 MG: 325 TABLET ORAL at 21:21

## 2023-03-20 NOTE — RESTORATIVE TECHNICIAN NOTE
Restorative Technician Note      Patient Name: Zen Moore  Restorative Tech Visit Date: 03/20/23  Note Type: Mobility  Patient Position Upon Consult: Seated edge of bed  Activity Performed: Ambulated  Assistive Device: Roller walker  Patient Position at End of Consult: Bedside chair;  All needs within reach    Marialuisa Patino Restorative Technician

## 2023-03-20 NOTE — PROGRESS NOTES
[facility]  Progress Note - Kali Esposito  1960, 58 y o  male MRN: [id number]  Unit/Bed#: [facility] 610-01 Encounter: [id number]  Primary Care Provider: Gus Aparicio MD   Date and time admitted to hospital: 3/15/2023  6:43 PM    Hypoxia  Assessment & Plan  - Patient noted to be hypoxic to 87% on room air in the evening on 3/18 - Patient denies history of asthma, FRIDA, COPD  - Patient denies cough and lungs clear at that time; however, patient noted to be febrile that evening as well    - Follow up CXR 3/19: No acute cardiopulmonary disease  - Viral testing negative for Covid 19, Flu A and B, RSV  - No increasing oxygen requirements    Thrombocytopenia (HCC)  Assessment & Plan  - Continue to follow     DVT prophylaxis  Assessment & Plan  - lovenox  - SCDs    Acute midline low back pain without sciatica  Assessment & Plan  - multimodal pain regimen  - scheduled tylenol, robaxin, gabapentin  - PRN oxycodone, dilaudid  - senokot, colace, dulcolax bowel regimen    Urinary retention  Assessment & Plan  - no prior hx of urinary retention  - likely due to acute spinal cord compression  - urinary retention protocol  -3/19 - Pelvic pressure, bladder scan >450cc    -Follow up UA and labs   -3/19 - NS had signed off on 3/17 but the note states that he was voiding without difficulty  This seems to have been the case through 3/18; however, patient is currently complaining of pelvic pressure and his bladder scan is >450cc  Neurosurgery made aware via tiger text   - Per NSGY 3/19: patient voided 400 cc independently   - Follow up outpatient with neurosurgery in 2 weeks  - Monitor post-void residual     * Closed unstable burst fracture of first lumbar vertebra (HCC)  Assessment & Plan  - fall from 10-12 feet  - s/p T11-L3 pedicle fixation  - TSLO brace: to be worn when out of bed or head of bed greater than 45 degrees anticipate patient will be wearing for approximately 6 weeks    - OOB  - PT/OT - recommend discharge home with outpatient PT  - Neurosurgery outpatient follow up in 2 weeks  - Discontinue IV pain meds, schedule PO pain meds to control pain      Bowel Regimen: Colace, Senna, Dulcolax, Miralax  VTE Prophylaxis:Enoxaparin (Lovenox)     Disposition: continue medsurg status     Subjective   Chief Complaint: Continued pain    Subjective: Patient states that he is continuing to have 6/10 pain  He states that his pain is not well controlled on the pain regimen here secondary to not receiving his oral pain meds on time  Patient is tolerating his diet  He states he is feeling constipated but reports last bowel movement yesterday  Pulling 2L on IS  Objective   Vitals:   Temp:  [98 6 °F (37 °C)-102 6 °F (39 2 °C)] 99 2 °F (37 3 °C)  HR:  [] 95  Resp:  [16-18] 18  BP: (138-149)/(80-87) 138/80    I/O       03/18 0701  03/19 0700 03/19 0701  03/20 0700 03/20 0701  03/21 0700    P  O   240     I V  (mL/kg)       IV Piggyback 100      Total Intake(mL/kg) 100 (1 1) 240 (2 7)     Urine (mL/kg/hr) 1800 (0 8) 1400 (0 7)     Stool 0      Total Output 1800 1400     Net -1700 -1160            Unmeasured Urine Occurrence 1 x      Unmeasured Stool Occurrence 1 x             Physical Exam:   GENERAL APPEARANCE: Patient in no acute distress  HEENT: NCAT; PERRL, EOMs intact; Mucous membranes moist  NECK / BACK: ROM normal  CV: Regular rate and rhythm; no murmur/gallops/rubs appreciated  CHEST / LUNGS: Clear to auscultation; no wheezes/rales/rhonci  ABD: NABS; non-distended; non-tender  : voiding  EXT: +2 pulses bilaterally upper & lower extremities; no edema  NEURO: GCS 15; no focal neurologic deficits; neurovascularly intact  SKIN: Warm, dry and well perfused; no rash; no jaundice        Invasive Devices     Peripheral Intravenous Line  Duration           Peripheral IV 03/20/23 Left Wrist <1 day                      Lab Results:   Results: I have personally reviewed all pertinent laboratory/tests results, BMP/CMP:   Lab Results   Component Value Date    SODIUM 133 (L) 03/20/2023    K 3 8 03/20/2023    CL 99 03/20/2023    CO2 29 03/20/2023    BUN 20 03/20/2023    CREATININE 0 69 03/20/2023    CALCIUM 8 6 03/20/2023    EGFR 101 03/20/2023    and CBC:   Lab Results   Component Value Date    WBC 9 81 03/20/2023    HGB 13 8 03/20/2023    HCT 40 0 03/20/2023    MCV 92 03/20/2023     03/20/2023    MCH 31 7 03/20/2023    MCHC 34 5 03/20/2023    RDW 12 0 03/20/2023    MPV 10 6 03/20/2023    NRBC 0 03/20/2023     Imaging: I have personally reviewed pertinent reports  Other Studies: XR chest 3/19/23: No acute cardiopulmonary disease

## 2023-03-20 NOTE — APP STUDENT NOTE
VINEET STUDENT  Inpatient Progress Note for TRAINING ONLY  Not Part of Legal Medical Record       Progress Note - Al Lo  58 y o  male MRN: [id number]    Unit/Bed#: [facility] 610-01 Encounter: [id number]      Assessment/Plan:  Problem List        Respiratory    Hypoxia    Current Assessment & Plan     - Patient noted to be hypoxic to 87% on room air in the evening on 3/18   - pt currently averaging 93% on RA   - Patient denies history of asthma, FRIDA, COPD  - Patient denies cough, SOB   - lungs clear B/L on exam 3/20  - Pt is afebrile   Plan  - Follow up CXR 3/19: No acute cardiopulmonary disease  - Viral testing negative for Covid 19, Flu A and B, RSV  - pt with resolved episode of hypoxia  Musculoskeletal and Integument        * (Principal) Closed unstable burst fracture of first lumbar vertebra [facility])    Current Assessment & Plan     - fall from 10-12 feet through ceiling  - 3/15 CT revealed L1 burst fracture with cauda equina crowding  - Pt with urinary retention, concern for cauda equina syndrome, NSGY consulted  - POD 4 s/p T11-L3 pedicle fixation  - TSLO brace: to be worn when out of bed or head of bed greater than 45 degrees anticipate patient will be wearing for approximately 6 weeks  - Pt without neurologic deficits on exam today  Plan  - OOB  - PT/OT - recommend discharge home with outpatient PT  - Neurosurgery outpatient follow up in 2 weeks with upright XRs             Hematopoietic and Hemostatic    Thrombocytopenia ([facility] [address] )    Current Assessment & Plan     - Continue to follow             Genitourinary    Urinary retention    Current Assessment & Plan     - no prior hx of urinary retention  - likely due to acute spinal cord compression  - urinary retention protocol  -3/19 - Pelvic pressure, bladder scan >450cc  -UA on 3/19 was negative for infection   - CBC showed no leukocytosis   -3/19 - NS had signed off on 3/17 but the note states that he was voiding without difficulty   This seems to have been the case through 3/18; however, patient is currently complaining of pelvic pressure and his bladder scan is >450cc  Neurosurgery made aware, saw pt this AM, signed off stating he is voiding without difficulty  - Per NSGY 3/19: patient voided 400 cc independently   - Follow up outpatient with neurosurgery in 2 weeks  - Pt with out dysuria, abdominal pain, pelvic pressure, urinating independently   - states "I need more fluids in, I typically don't pee until I am full" which he states can take more than 4 hours  Plan   -strict I/O's   - post void residual scan x 2 today, if the second scan has less residual than the first, no further workup recommended  Other            Acute midline low back pain without sciatica    Current Assessment & Plan     - multimodal pain regimen  - scheduled tylenol, robaxin, gabapentin  - PRN oxycodone, IV dilaudid  - senokot, colace, dulcolax bowel regimen  Plan  -wean off of IV dilaudid as tolerated in preparation for d/c          DVT prophylaxis    Current Assessment & Plan     - lovenox  - SCDs        Other Visit Diagnoses     Closed burst fracture of lumbar vertebra, initial encounter ([facility] [address] )    -  Primary              Subjective:   CC: "still in moderate pain because the timing of my pain meds are off"   57 y/o male s/p fall through roof, sustaining an L1 burst fracture w/ cauda equina crowding  Pt had urinary retention which caused concern for cauda equina syndrome, NSGY intervened with lumber decompression  Pt is POD4 from T11-L3 decompression and fusion  Pt states pain is mostly in his low back and is about a 6/10 right now while he is waiting for a dose of pain medication  Otherwise, he c/o some constipation  Pt received a suppository yesterday with subsequent BM  Pt denies obstipation, abd pain, nausea, vomiting, chills, numbness, paresthesias, HA  Pt with episode of hypoxia 2 days ago, O2 sat 87%, has resolved  Pt averaging now at 93% on RA   Pt also with recent Hx of 102 fever, now stable at 99 1  Surgical site was clean and intact, no signs of edema or erythema  UA was negative  CXR showed no signs of pneumonia  CBC showed no leukocytosis  Objective:     Vitals: Blood pressure 138/80, pulse 87, temperature 99 3 °F (37 4 °C), resp  rate 18, weight 87 9 kg (193 lb 12 6 oz), SpO2 93 %  ,Body mass index is 28 62 kg/m²  Intake/Output Summary (Last 24 hours) at 3/20/2023 0952  Last data filed at 3/20/2023 0243  Gross per 24 hour   Intake 240 ml   Output 1000 ml   Net -760 ml       Physical Exam: /80   Pulse 87   Temp 99 3 °F (37 4 °C)   Resp 18   Wt 87 9 kg (193 lb 12 6 oz)   SpO2 93%   BMI 28 62 kg/m²   General appearance: alert and oriented, in no acute distress  Lungs: clear to auscultation bilaterally  Heart: regular rate and rhythm, S1, S2 normal, no murmur, click, rub or gallop  Abdomen: soft, non-tender; bowel sounds normal; no masses,  no organomegaly  Extremities: extremities normal, warm and well-perfused; no cyanosis, clubbing, or edema   Neurologic: Grossly normal     Invasive Devices     Peripheral Intravenous Line  Duration           Peripheral IV 03/20/23 Left Wrist <1 day                Lab, Imaging and other studies: I have personally reviewed pertinent reports      VTE Pharmacologic Prophylaxis: Enoxaparin (Lovenox)  VTE Mechanical Prophylaxis: sequential compression device

## 2023-03-20 NOTE — ASSESSMENT & PLAN NOTE
- no prior hx of urinary retention  - likely due to acute spinal cord compression  - urinary retention protocol  -3/19 - Pelvic pressure, bladder scan >450cc    -Follow up UA and labs   -3/19 - NS had signed off on 3/17 but the note states that he was voiding without difficulty  This seems to have been the case through 3/18; however, patient is currently complaining of pelvic pressure and his bladder scan is >450cc  Neurosurgery made aware via tiger text   - Per NSGY 3/19: patient voided 400 cc independently   - Follow up outpatient with neurosurgery in 2 weeks     - Monitor post-void residual

## 2023-03-20 NOTE — QUICK NOTE
Noted fever to 102  6  Patient denies subjective fevers/chills, denies shortness of breath, chest discomfort, or dysuria  Noted essentially normal UA from earlier today  No calf pain/swelling/tendernes bilaterally, no calf pain with passive dorsiflexion  Did not remove Neurosurgery team's dressings but there is no erythema or discharge around the dressings to suggest SSI  Will continue to monitor

## 2023-03-20 NOTE — ASSESSMENT & PLAN NOTE
- fall from 10-12 feet  - s/p T11-L3 pedicle fixation  - TSLO brace: to be worn when out of bed or head of bed greater than 45 degrees anticipate patient will be wearing for approximately 6 weeks    - OOB  - PT/OT - recommend discharge home with outpatient PT  - Neurosurgery outpatient follow up in 2 weeks  - Discontinue IV pain meds, schedule PO pain meds to control pain

## 2023-03-20 NOTE — PLAN OF CARE
Problem: MOBILITY - ADULT  Goal: Maintain or return to baseline ADL function  Description: INTERVENTIONS:  -  Assess patient's ability to carry out ADLs; assess patient's baseline for ADL function and identify physical deficits which impact ability to perform ADLs (bathing, care of mouth/teeth, toileting, grooming, dressing, etc )  - Assess/evaluate cause of self-care deficits   - Assess range of motion  - Assess patient's mobility; develop plan if impaired  - Assess patient's need for assistive devices and provide as appropriate  - Encourage maximum independence but intervene and supervise when necessary  - Involve family in performance of ADLs  - Assess for home care needs following discharge   - Consider OT consult to assist with ADL evaluation and planning for discharge  - Provide patient education as appropriate  Outcome: Progressing  Goal: Maintains/Returns to pre admission functional level  Description: INTERVENTIONS:  - Perform BMAT or MOVE assessment daily    - Set and communicate daily mobility goal to care team and patient/family/caregiver  - Collaborate with rehabilitation services on mobility goals if consulted  - Perform Range of Motion  times a day  - Reposition patient every  hours    - Dangle patient  times a day  - Stand patient times a day  - Ambulate patient  times a day  - Out of bed to chair  times a day   - Out of bed for meals  times a day  - Out of bed for toileting  - Record patient progress and toleration of activity level   Outcome: Progressing     Problem: Prexisting or High Potential for Compromised Skin Integrity  Goal: Skin integrity is maintained or improved  Description: INTERVENTIONS:  - Identify patients at risk for skin breakdown  - Assess and monitor skin integrity  - Assess and monitor nutrition and hydration status  - Monitor labs   - Assess for incontinence   - Turn and reposition patient  - Assist with mobility/ambulation  - Relieve pressure over bony prominences  - Avoid friction and shearing  - Provide appropriate hygiene as needed including keeping skin clean and dry  - Evaluate need for skin moisturizer/barrier cream  - Collaborate with interdisciplinary team   - Patient/family teaching  - Consider wound care consult   Outcome: Progressing     Problem: NEUROSENSORY - ADULT  Goal: Achieves stable or improved neurological status  Description: INTERVENTIONS  - Monitor and report changes in neurological status  - Monitor vital signs such as temperature, blood pressure, glucose, and any other labs ordered   - Initiate measures to prevent increased intracranial pressure  - Monitor for seizure activity and implement precautions if appropriate      Outcome: Progressing  Goal: Remains free of injury related to seizures activity  Description: INTERVENTIONS  - Maintain airway, patient safety  and administer oxygen as ordered  - Monitor patient for seizure activity, document and report duration and description of seizure to physician/advanced practitioner  - If seizure occurs,  ensure patient safety during seizure  - Reorient patient post seizure  - Seizure pads on all 4 side rails  - Instruct patient/family to notify RN of any seizure activity including if an aura is experienced  - Instruct patient/family to call for assistance with activity based on nursing assessment  - Administer anti-seizure medications if ordered    Outcome: Progressing  Goal: Achieves maximal functionality and self care  Description: INTERVENTIONS  - Monitor swallowing and airway patency with patient fatigue and changes in neurological status  - Encourage and assist patient to increase activity and self care     - Encourage visually impaired, hearing impaired and aphasic patients to use assistive/communication devices  Outcome: Progressing     Problem: GASTROINTESTINAL - ADULT  Goal: Minimal or absence of nausea and/or vomiting  Description: INTERVENTIONS:  - Administer IV fluids if ordered to ensure adequate hydration  - Maintain NPO status until nausea and vomiting are resolved  - Nasogastric tube if ordered  - Administer ordered antiemetic medications as needed  - Provide nonpharmacologic comfort measures as appropriate  - Advance diet as tolerated, if ordered  - Consider nutrition services referral to assist patient with adequate nutrition and appropriate food choices  Outcome: Progressing  Goal: Maintains or returns to baseline bowel function  Description: INTERVENTIONS:  - Assess bowel function  - Encourage oral fluids to ensure adequate hydration  - Administer IV fluids if ordered to ensure adequate hydration  - Administer ordered medications as needed  - Encourage mobilization and activity  - Consider nutritional services referral to assist patient with adequate nutrition and appropriate food choices  Outcome: Progressing  Goal: Maintains adequate nutritional intake  Description: INTERVENTIONS:  - Monitor percentage of each meal consumed  - Identify factors contributing to decreased intake, treat as appropriate  - Assist with meals as needed  - Monitor I&O, weight, and lab values if indicated  - Obtain nutrition services referral as needed  Outcome: Progressing     Problem: GENITOURINARY - ADULT  Goal: Maintains or returns to baseline urinary function  Description: INTERVENTIONS:  - Assess urinary function  - Encourage oral fluids to ensure adequate hydration if ordered  - Administer IV fluids as ordered to ensure adequate hydration  - Administer ordered medications as needed  - Offer frequent toileting  - Follow urinary retention protocol if ordered  Outcome: Progressing  Goal: Absence of urinary retention  Description: INTERVENTIONS:  - Assess patient’s ability to void and empty bladder  - Monitor I/O  - Bladder scan as needed  - Discuss with physician/AP medications to alleviate retention as needed  - Discuss catheterization for long term situations as appropriate  Outcome: Progressing     Problem: SKIN/TISSUE INTEGRITY - ADULT  Goal: Skin Integrity remains intact(Skin Breakdown Prevention)  Description: Assess:  -Perform Leandro assessment every   -Clean and moisturize skin every   -Inspect skin when repositioning, toileting, and assisting with ADLS  -Assess under medical devices such as every   -Assess extremities for adequate circulation and sensation     Bed Management:  -Have minimal linens on bed & keep smooth, unwrinkled  -Change linens as needed when moist or perspiring  -Avoid sitting or lying in one position for more than  hours while in bed  -Keep HOB atdegrees     Toileting:  -Offer bedside commode  -Assess for incontinence every   -Use incontinent care products after each incontinent episode such as     Activity:  -Mobilize patient times a day  -Encourage activity and walks on unit  -Encourage or provide ROM exercises   -Turn and reposition patient every  Hours  -Use appropriate equipment to lift or move patient in bed  -Instruct/ Assist with weight shifting every  when out of bed in chair  -Consider limitation of chair hour intervals    Skin Care:  -Avoid use of baby powder, tape, friction and shearing, hot water or constrictive clothing  -Relieve pressure over bony prominences using   -Do not massage red bony areas    Next Steps:  -Teach patient strategies to minimize risks such as    -Consider consults to  interdisciplinary teams such as   Outcome: Progressing  Goal: Incision(s), wounds(s) or drain site(s) healing without S/S of infection  Description: INTERVENTIONS  - Assess and document dressing, incision, wound bed, drain sites and surrounding tissue  - Provide patient and family education  - Perform skin care/dressing changes every  Outcome: Progressing

## 2023-03-20 NOTE — ASSESSMENT & PLAN NOTE
- Patient noted to be hypoxic to 87% on room air in the evening on 3/18 - Patient denies history of asthma, FRIDA, COPD  - Patient denies cough and lungs clear at that time; however, patient noted to be febrile that evening as well    - Follow up CXR 3/19: No acute cardiopulmonary disease    - Viral testing negative for Covid 19, Flu A and B, RSV  - No increasing oxygen requirements

## 2023-03-21 ENCOUNTER — TRANSITIONAL CARE MANAGEMENT (OUTPATIENT)
Dept: FAMILY MEDICINE CLINIC | Facility: CLINIC | Age: 63
End: 2023-03-21

## 2023-03-21 VITALS
BODY MASS INDEX: 28.62 KG/M2 | HEART RATE: 86 BPM | DIASTOLIC BLOOD PRESSURE: 81 MMHG | SYSTOLIC BLOOD PRESSURE: 134 MMHG | OXYGEN SATURATION: 93 % | TEMPERATURE: 99 F | WEIGHT: 193.78 LBS | RESPIRATION RATE: 18 BRPM

## 2023-03-21 LAB
ANION GAP SERPL CALCULATED.3IONS-SCNC: 2 MMOL/L (ref 4–13)
BASOPHILS # BLD AUTO: 0.03 THOUSANDS/ÂΜL (ref 0–0.1)
BASOPHILS NFR BLD AUTO: 0 % (ref 0–1)
BUN SERPL-MCNC: 16 MG/DL (ref 5–25)
CALCIUM SERPL-MCNC: 8.6 MG/DL (ref 8.3–10.1)
CHLORIDE SERPL-SCNC: 99 MMOL/L (ref 96–108)
CO2 SERPL-SCNC: 30 MMOL/L (ref 21–32)
CREAT SERPL-MCNC: 0.66 MG/DL (ref 0.6–1.3)
EOSINOPHIL # BLD AUTO: 0.2 THOUSAND/ÂΜL (ref 0–0.61)
EOSINOPHIL NFR BLD AUTO: 2 % (ref 0–6)
ERYTHROCYTE [DISTWIDTH] IN BLOOD BY AUTOMATED COUNT: 11.9 % (ref 11.6–15.1)
GFR SERPL CREATININE-BSD FRML MDRD: 103 ML/MIN/1.73SQ M
GLUCOSE SERPL-MCNC: 119 MG/DL (ref 65–140)
HCT VFR BLD AUTO: 38.6 % (ref 36.5–49.3)
HGB BLD-MCNC: 13.5 G/DL (ref 12–17)
IMM GRANULOCYTES # BLD AUTO: 0.06 THOUSAND/UL (ref 0–0.2)
IMM GRANULOCYTES NFR BLD AUTO: 1 % (ref 0–2)
LYMPHOCYTES # BLD AUTO: 0.52 THOUSANDS/ÂΜL (ref 0.6–4.47)
LYMPHOCYTES NFR BLD AUTO: 6 % (ref 14–44)
MCH RBC QN AUTO: 32.1 PG (ref 26.8–34.3)
MCHC RBC AUTO-ENTMCNC: 35 G/DL (ref 31.4–37.4)
MCV RBC AUTO: 92 FL (ref 82–98)
MONOCYTES # BLD AUTO: 1.07 THOUSAND/ÂΜL (ref 0.17–1.22)
MONOCYTES NFR BLD AUTO: 12 % (ref 4–12)
NEUTROPHILS # BLD AUTO: 6.88 THOUSANDS/ÂΜL (ref 1.85–7.62)
NEUTS SEG NFR BLD AUTO: 79 % (ref 43–75)
NRBC BLD AUTO-RTO: 0 /100 WBCS
PLATELET # BLD AUTO: 187 THOUSANDS/UL (ref 149–390)
PMV BLD AUTO: 10.8 FL (ref 8.9–12.7)
POTASSIUM SERPL-SCNC: 3.6 MMOL/L (ref 3.5–5.3)
RBC # BLD AUTO: 4.2 MILLION/UL (ref 3.88–5.62)
SODIUM SERPL-SCNC: 131 MMOL/L (ref 135–147)
WBC # BLD AUTO: 8.76 THOUSAND/UL (ref 4.31–10.16)

## 2023-03-21 RX ORDER — METHOCARBAMOL 750 MG/1
750 TABLET, FILM COATED ORAL EVERY 6 HOURS SCHEDULED
Qty: 60 TABLET | Refills: 0 | Status: SHIPPED | OUTPATIENT
Start: 2023-03-21

## 2023-03-21 RX ORDER — CALCIUM CARBONATE 200(500)MG
1000 TABLET,CHEWABLE ORAL DAILY PRN
Refills: 0
Start: 2023-03-21

## 2023-03-21 RX ORDER — ACETAMINOPHEN 325 MG/1
975 TABLET ORAL EVERY 8 HOURS SCHEDULED
Refills: 0
Start: 2023-03-21

## 2023-03-21 RX ORDER — POLYETHYLENE GLYCOL 3350 17 G/17G
17 POWDER, FOR SOLUTION ORAL DAILY PRN
Refills: 0
Start: 2023-03-21

## 2023-03-21 RX ORDER — OXYCODONE HYDROCHLORIDE 5 MG/1
5 TABLET ORAL EVERY 6 HOURS PRN
Qty: 25 TABLET | Refills: 0 | Status: SHIPPED | OUTPATIENT
Start: 2023-03-21 | End: 2023-03-23 | Stop reason: SDUPTHER

## 2023-03-21 RX ORDER — OXYCODONE HYDROCHLORIDE 5 MG/1
5 TABLET ORAL EVERY 4 HOURS PRN
Status: DISCONTINUED | OUTPATIENT
Start: 2023-03-21 | End: 2023-03-21 | Stop reason: HOSPADM

## 2023-03-21 RX ORDER — GINSENG 100 MG
1 CAPSULE ORAL 3 TIMES DAILY
Qty: 15 G | Refills: 0
Start: 2023-03-21

## 2023-03-21 RX ORDER — DOCUSATE SODIUM 100 MG/1
100 CAPSULE, LIQUID FILLED ORAL 2 TIMES DAILY
Qty: 30 CAPSULE | Refills: 0 | Status: SHIPPED | OUTPATIENT
Start: 2023-03-21

## 2023-03-21 RX ORDER — GABAPENTIN 300 MG/1
300 CAPSULE ORAL 3 TIMES DAILY
Qty: 45 CAPSULE | Refills: 0 | Status: SHIPPED | OUTPATIENT
Start: 2023-03-21

## 2023-03-21 RX ORDER — SENNOSIDES 8.6 MG
8.6 TABLET ORAL
Qty: 15 TABLET | Refills: 0 | Status: SHIPPED | OUTPATIENT
Start: 2023-03-21

## 2023-03-21 RX ADMIN — GABAPENTIN 300 MG: 300 CAPSULE ORAL at 08:14

## 2023-03-21 RX ADMIN — POLYETHYLENE GLYCOL 3350 17 G: 17 POWDER, FOR SOLUTION ORAL at 08:15

## 2023-03-21 RX ADMIN — ACETAMINOPHEN 975 MG: 325 TABLET ORAL at 13:49

## 2023-03-21 RX ADMIN — OXYCODONE HYDROCHLORIDE 10 MG: 10 TABLET ORAL at 10:59

## 2023-03-21 RX ADMIN — ENOXAPARIN SODIUM 30 MG: 30 INJECTION SUBCUTANEOUS at 08:14

## 2023-03-21 RX ADMIN — METHOCARBAMOL 750 MG: 750 TABLET ORAL at 11:00

## 2023-03-21 RX ADMIN — ACETAMINOPHEN 975 MG: 325 TABLET ORAL at 05:18

## 2023-03-21 RX ADMIN — OXYCODONE HYDROCHLORIDE 5 MG: 5 TABLET ORAL at 03:34

## 2023-03-21 RX ADMIN — METHOCARBAMOL 750 MG: 750 TABLET ORAL at 05:18

## 2023-03-21 RX ADMIN — DOCUSATE SODIUM 100 MG: 100 CAPSULE, LIQUID FILLED ORAL at 08:14

## 2023-03-21 RX ADMIN — OXYCODONE HYDROCHLORIDE 5 MG: 5 TABLET ORAL at 08:15

## 2023-03-21 RX ADMIN — OXYCODONE HYDROCHLORIDE 5 MG: 5 TABLET ORAL at 15:00

## 2023-03-21 NOTE — ASSESSMENT & PLAN NOTE
- fall from 10-12 feet  - s/p T11-L3 pedicle fixation  - TSLO brace: to be worn when out of bed or head of bed greater than 45 degrees anticipate patient will be wearing for approximately 6 weeks    - Adequate pain control with use of p o  pain medications  - PT/OT - recommend discharge home with outpatient PT  - Neurosurgery outpatient follow up in 2 weeks  - Discontinue IV pain meds, schedule PO pain meds to control pain

## 2023-03-21 NOTE — CASE MANAGEMENT
Case Management Discharge Planning Note    Patient name Huy Swenson  [facility] [id number]  : 1960 Date 3/21/2023       Current Admission Date: 3/15/2023  Current Admission Diagnosis:Closed unstable burst fracture of first lumbar vertebra [facility])   Patient Active Problem List    Diagnosis Date Noted   • Hypoxia 2023   • Thrombocytopenia ([facility] [address] ) 2023   • Closed unstable burst fracture of first lumbar vertebra ([facility] [address] ) 2023   • Urinary retention 2023   • Acute midline low back pain without sciatica 2023   • DVT prophylaxis 2023   • Shoulder joint pain 2023   • Strain of supraspinatus muscle or tendon 2023   • Vitamin D deficiency 2013      LOS (days): 6  Geometric Mean LOS (GMLOS) (days): 2 80  Days to GMLOS:-2 8     OBJECTIVE:  Risk of Unplanned Readmission Score: 9 2         Current admission status: Inpatient   [facility] [address]  Phone: [phone] Fax: [phone]    Primary Care Provider: Ana Lopez MD    Primary Insurance: BLUE CROSS  Secondary Insurance:     DISCHARGE DETAILS:    Pt will d/c home today with OP therapy  Pt requested transport home via ambulance  CM explained a wheelchair Audrey cesar would be an OOP cost, and that a BLS transport home would need to be authorized by his insurance and there was no guarantee they would cover this transport home  CM offered to secure transports as long as he knew potential risks; but pt prefers to have his wife transport home instead

## 2023-03-21 NOTE — RESTORATIVE TECHNICIAN NOTE
Restorative Technician Note      Patient Name: Cindy Muhammad  Restorative Tech Visit Date: 03/21/23  Note Type: Mobility  Patient Position Upon Consult: Supine    Pt declined OOB to chair at this time due to stomach pains      Paulino Gan, Restorative Technician

## 2023-03-21 NOTE — ASSESSMENT & PLAN NOTE
- no prior hx of urinary retention  - likely due to acute spinal cord compression  - urinary retention protocol  -3/19 - Pelvic pressure, bladder scan >450cc    -Follow up UA and labs   -3/19 - NS had signed off on 3/17 but the note states that he was voiding without difficulty  This seems to have been the case through 3/18; however, patient is currently complaining of pelvic pressure and his bladder scan is >450cc  Neurosurgery made aware via tiger text   - Per NSGY 3/19: patient voided 400 cc independently   - Follow up outpatient with neurosurgery in 2 weeks     - Monitor post-void residual   - Patient voiding independently 3/21

## 2023-03-21 NOTE — ASSESSMENT & PLAN NOTE
- Patient noted to be hypoxic to 87% on room air in the evening on 3/18 - Patient denies history of asthma, FRIDA, COPD  - Patient denies cough and lungs clear at that time; however, patient noted to be febrile that evening as well    - Follow up CXR 3/19: No acute cardiopulmonary disease    - Viral testing negative for Covid 19, Flu A and B, RSV  - Patient on room air at this time  - No increasing oxygen requirements

## 2023-03-21 NOTE — INCIDENTAL FINDINGS
The following findings require follow up:  Radiographic finding   Finding:  Mild disc and facet degenerative change in the lower lumbar spine  At least moderate bilateral neural foraminal narrowing at L4-5 and L5-S1    Hepatic steatosis  Left renal cortical cysts measuring up to 1 cm  Mild prostate enlargement  L1-L2:  Posterior disc osteophytes  Facet and ligamentum flavum hypertrophy  No central canal stenosis  No neural foraminal narrowing      L2-L3:  Posterior disc bulge and disc osteophytes  Facet and ligamentum flavum hypertrophy  Mild central canal stenosis  Moderate to severe right neural foraminal narrowing      L3-L4:  Posterior disc bulge and disc osteophytes  Facet and ligamentum flavum hypertrophy and facet osteophytosis  Moderate central canal stenosis and crowding of the cauda equina  Encroachment of the traversing right L4 nerve roots in the   subarticular zone  Mild to moderate bilateral neural foraminal narrowing      L4-L5:  Posterior disc bulge and disc osteophytes  Facet osteophytosis and ligamentum flavum hypertrophy  Moderate central canal stenosis and crowding of the cauda equina  Encroachment of the traversing L5 nerve roots in the subarticular zones  Moderate to severe right and mild left neural foraminal narrowing      L5-S1:  Posterior disc osteophytes and mild facet osteophytosis  No central canal stenosis  Mild encroachment of the traversing S1 nerve roots in the subarticular zones  Moderate right and mild left neural foraminal narrowing  Findings discussed with patient  Patient verbalized understanding of findings and follow up        Follow up should be done within 1 month(s)    Please notify the following clinician to assist with the follow up:   Dr Foster Maria

## 2023-03-21 NOTE — DISCHARGE SUMMARY
[facility]  Discharge- Raghavendra Simms  1960, 58 y o  male MRN: [id number]  Unit/Bed#: [facility] 610-01 Encounter: [id number]  Primary Care Provider: Denise Swanson MD   Date and time admitted to hospital: 3/15/2023  6:43 PM    Hypoxia  Assessment & Plan  - Patient noted to be hypoxic to 87% on room air in the evening on 3/18 - Patient denies history of asthma, FRIDA, COPD  - Patient denies cough and lungs clear at that time; however, patient noted to be febrile that evening as well    - Follow up CXR 3/19: No acute cardiopulmonary disease  - Viral testing negative for Covid 19, Flu A and B, RSV  - Patient on room air at this time  - No increasing oxygen requirements    Thrombocytopenia ([facility] [address] )  Assessment & Plan  - Continue to follow   -3/21 platelets 017    DVT prophylaxis  Assessment & Plan  - lovenox  - SCDs    Acute midline low back pain without sciatica  Assessment & Plan  - multimodal pain regimen  - scheduled tylenol, robaxin, gabapentin  - PRN oxycodone, dilaudid  - senokot, colace, dulcolax bowel regimen    Urinary retention  Assessment & Plan  - no prior hx of urinary retention  - likely due to acute spinal cord compression  - urinary retention protocol  -3/19 - Pelvic pressure, bladder scan >450cc    -Follow up UA and labs   -3/19 - NS had signed off on 3/17 but the note states that he was voiding without difficulty  This seems to have been the case through 3/18; however, patient is currently complaining of pelvic pressure and his bladder scan is >450cc  Neurosurgery made aware via tiger text   - Per NSGY 3/19: patient voided 400 cc independently   - Follow up outpatient with neurosurgery in 2 weeks     - Monitor post-void residual   - Patient voiding independently 3/21    * Closed unstable burst fracture of first lumbar vertebra ([address] )  Assessment & Plan  - fall from 10-12 feet  - s/p T11-L3 pedicle fixation  - TSLO brace: to be worn when out of bed or head of bed greater than 45 degrees anticipate patient will be wearing for approximately 6 weeks  - Adequate pain control with use of p o  pain medications  - PT/OT - recommend discharge home with outpatient PT  - Neurosurgery outpatient follow up in 2 weeks  - Discontinue IV pain meds, schedule PO pain meds to control pain        Bowel Regimen: Colace, senna, Dulcolax,  VTE Prophylaxis:Enoxaparin (Lovenox)     Disposition: Medically stable for discharge home with outpatient rehab    Subjective   Chief Complaint: No new complaints    Subjective: Patient states he is doing well today and his pain is much more tolerable  Patient feels stable and is motivated for discharge  He is tolerating a diet  He reports bowel movement this morning that 0300, but states he still feels constipated  He would like to be discharged on a bowel regimen  Objective   Vitals:   Temp:  [99 °F (37 2 °C)-99 7 °F (37 6 °C)] 99 °F (37 2 °C)  HR:  [86-95] 86  Resp:  [16-18] 18  BP: (134-138)/(80-81) 134/81    I/O       03/19 0701  03/20 0700 03/20 0701  03/21 0700 03/21 0701  03/22 0700    P  O  240 1080     IV Piggyback       Total Intake(mL/kg) 240 (2 7) 1080 (12 3)     Urine (mL/kg/hr) 1400 (0 7) 1700 (0 8)     Stool  0     Total Output 1400 1700     Net -1160 -620            Unmeasured Stool Occurrence  2 x            Physical Exam:   GENERAL APPEARANCE: Patient in no acute distress  Resting comfortably in bed  HEENT: NCAT; PERRL, EOMs intact; Mucous membranes moist  NECK / BACK: Cervical ROM intact  CV: Regular rate and rhythm; no murmur/gallops/rubs appreciated  CHEST / LUNGS: Clear to auscultation; no wheezes/rales/rhonci  ABD: NABS; soft; non-distended; mildly tender to palpation  : Voiding independently  EXT: +2 pulses bilaterally upper & lower extremities; no edema  NEURO: GCS 15; no focal neurologic deficits; neurovascularly intact  SKIN: Warm, dry and well perfused; no rash; no jaundice        Invasive Devices     Peripheral Intravenous Line  Duration           Peripheral IV 03/20/23 Left Wrist 1 day                      Lab Results:   Results: I have personally reviewed all pertinent laboratory/tests results, BMP/CMP:   Lab Results   Component Value Date    SODIUM 131 (L) 03/21/2023    K 3 6 03/21/2023    CL 99 03/21/2023    CO2 30 03/21/2023    BUN 16 03/21/2023    CREATININE 0 66 03/21/2023    CALCIUM 8 6 03/21/2023    EGFR 103 03/21/2023    and CBC:   Lab Results   Component Value Date    WBC 8 76 03/21/2023    HGB 13 5 03/21/2023    HCT 38 6 03/21/2023    MCV 92 03/21/2023     03/21/2023    MCH 32 1 03/21/2023    MCHC 35 0 03/21/2023    RDW 11 9 03/21/2023    MPV 10 8 03/21/2023    NRBC 0 03/21/2023     Imaging: I have personally reviewed pertinent reports  Other Studies: None         Medical Problems     Resolved Problems  Date Reviewed: 3/21/2023   None         Admission Date:   Admission Orders (From admission, onward)     Ordered        03/15/23 2000  Inpatient Admission  Once                        Admitting Diagnosis: Fracture [T14  8XXA]  Closed [facility] lumbar [facility] [S32 001A]    HPI: Documented by Tanner Magana MD on 3/16, "Narcisa Guthrie  is a 58 y o  male who presents with L1 burst fracture      Patient was working at a construction site, installing the floor, 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 through the beams into the basement below  He suffered several scrapes while falling to the floor, fell about 12 feet, landed on his heels and then lower back  Denies loss of consciousness, denies head strike  He did suffer immediate evacuation of his bowels upon landing on the floor, promptly got to his feet under his own power, ambulated to his car, drove home and showered before coming to the hospital   Denies numbness and tingling, denies subjective border difficulty in the lower extremities    Denies saddle anesthesia      Transferred from [facility] [address] upon diagnosis of L1 burst fracture  Patient has had urinary retention in the ER requiring straight cath for 700cc  Per patient he usually does not have difficulty voiding      Patient has no medical problems, takes no medications, drinks before bedtime sometimes (does not drink every day, last drink was 1 gin and orange juice at 10 AM this morning)  Endorses claustrophobia, mild, with a previous MRI years ago "    Procedures Performed: No orders of the defined types were placed in this encounter  Summary of Hospital Course: See above in reference hospital medical chart  Significant Findings, Care, Treatment and Services Provided: Patient was seen and evaluated by trauma team and admitted the finding of this stable L1 burst fracture  Patient was evaluated by neurosurgery and treated surgically with a T11-L3 fusion/screw fixation  Patient was monitored for urinary retention but has been urinating independently  Patient was evaluated by PT/OT who recommended discharge home with outpatient rehab  Complications: None    Condition at Discharge: good         Discharge instructions/Information to patient and family:   See after visit summary for information provided to patient and family  Provisions for Follow-Up Care:  See after visit summary for information related to follow-up care and any pertinent home health orders  PCP: Dain Smtih MD    Disposition: Home with outpatient rehab    Planned Readmission: No    Discharge Statement   I spent 30 minutes discharging the patient  This time was spent on the day of discharge  I had direct contact with the patient on the day of discharge  Additional documentation is required if more than 30 minutes were spent on discharge  Discharge Medications:  See after visit summary for reconciled discharge medications provided to patient and family

## 2023-03-22 NOTE — UTILIZATION REVIEW
NOTIFICATION OF ADMISSION DISCHARGE   This is a Notification of Discharge from [address]  Please be advised that this patient has been discharge from our facility  Below you will find the admission and discharge date and time including the patient’s disposition  UTILIZATION REVIEW CONTACT:  Ginette Grijalva  Utilization   Network Utilization Review Department  Phone: [phone] x carefully listen to the prompts  All voicemails are confidential   Email: [email or web address]     ADMISSION INFORMATION  PRESENTATION DATE: 3/15/2023  6:43 PM  OBERVATION ADMISSION DATE:   INPATIENT ADMISSION DATE: 3/15/23  8:00 PM   DISCHARGE DATE: 3/21/2023  3:53 PM   DISPOSITION:Home/Self Care    IMPORTANT INFORMATION:  Send all requests for admission clinical reviews, approved or denied determinations and any other requests to dedicated fax number below belonging to the campus where the patient is receiving treatment   List of dedicated fax numbers:  [address] DENIALS (Administrative/Medical Necessity) [phone]   [address] (Maternity/NICU/Pediatrics) [phone]   [facility] [phone]   [place] [address] [phone]   [address] [phone]   [address] [facility] [phone]   [address] [place] [address] [phone]   [address] [place], [place] [address] [address] [phone]   [facility]  [phone]   [facility] [phone]   [facility] [address] [facility] [phone]

## 2023-03-23 ENCOUNTER — DOCUMENTATION (OUTPATIENT)
Dept: NEUROSURGERY | Facility: CLINIC | Age: 63
End: 2023-03-23

## 2023-03-23 DIAGNOSIS — S32.001A CLOSED BURST FRACTURE OF LUMBAR VERTEBRA, INITIAL ENCOUNTER (HCC): ICD-10-CM

## 2023-03-23 DIAGNOSIS — G89.18 ACUTE POST-OPERATIVE PAIN: Primary | ICD-10-CM

## 2023-03-23 DIAGNOSIS — Z98.1 S/P LUMBAR SPINAL FUSION: Primary | ICD-10-CM

## 2023-03-23 RX ORDER — OXYCODONE HYDROCHLORIDE 10 MG/1
10 TABLET ORAL EVERY 4 HOURS PRN
Qty: 30 TABLET | Refills: 0 | Status: SHIPPED | OUTPATIENT
Start: 2023-03-23 | End: 2023-03-24 | Stop reason: SDUPTHER

## 2023-03-23 RX ORDER — OXYCODONE HYDROCHLORIDE 5 MG/1
5 TABLET ORAL EVERY 6 HOURS PRN
Qty: 25 TABLET | Refills: 0 | Status: CANCELLED | OUTPATIENT
Start: 2023-03-23 | End: 2023-04-02

## 2023-03-23 NOTE — TELEPHONE ENCOUNTER
Called patient to see how he is doing after surgery  Patient reports he is doing okay overall and denies any incisional issues or fevers  Patient is able to ambulate around the house and complete ADLs with assistance secondary to pain  He informed me that he has been taking more pain medication than was prescribed at d/c   see other telephone note regarding medication refill for information  We are prescribing a short course of oxycodone 10mg Q4 to help break patients pain cycle  He understands that we will start weaning him from this dosage when he needs his next refill  He should not take more than is prescribed without contacting the office first  Educated the patient about the importance of preventing blood clots and provided measures how to prevent them  Encouraged patient to take an over the counter stool softener, if he is taking narcotic pain medication  Encouraged fiber intake and fluids  Reviewed incision care with the patient  Advised that after three days he may take a shower and gently wash the surgical site with soap and water  Use clean wash cloth, towels, and clothing  Do not submerge in water until cleared by the surgeon  Do not apply any creams, ointments, or lotions to the site  Patient is aware to call the office if any redness, swelling, drainage, dehiscence of incision, or fever >100 F occurs  Patient is aware to call the office if any concerns or questions may arise  Reminded patient of his upcoming appointments with the date/time/location  Patient was appreciative for the call

## 2023-03-23 NOTE — TELEPHONE ENCOUNTER
Patient called requesting a refill of: oxycodone  He is 1 week s/p: Fusion Lumbar/thoracic Posterior Percutaneous Screw Fixation T11 -l3(navigated) by Dr Leslie Lane  He is currently taking oxycodone 5mg Q6 prn  Wife states that this has not been cutting it for the pain so he has been doubling up on the pain medication  She said while he was inpatient he was taking 10mg Q4 and was discharged home on 5mg Q6  Wife also said at discharge the nurse told him, "to just double up on the pain medication if the 5mg isn't cutting it"  Discussed with SHEKHAR MIN, he is okay with providing patient with oxycodone 10mg Q4 prn x 5 days and we will then re-eavluate at that point in time  Okay to refill medication at this time? [place] PDMP was checked and appropriate to patient's story and he has run out sooner than the original script was written for (SHEKHAR MIN aware)   Attached in chart in separate note

## 2023-03-23 NOTE — TELEPHONE ENCOUNTER
Pt requesting a refill on med as he is in pain and almost out  Wife would like a call to know that Rx was sent

## 2023-03-24 ENCOUNTER — TELEPHONE (OUTPATIENT)
Dept: NEUROSURGERY | Facility: CLINIC | Age: 63
End: 2023-03-24

## 2023-03-24 LAB — COLOGUARD RESULT REPORTABLE: NEGATIVE

## 2023-03-24 RX ORDER — OXYCODONE HYDROCHLORIDE 10 MG/1
10 TABLET ORAL EVERY 4 HOURS PRN
Qty: 30 TABLET | Refills: 0 | Status: SHIPPED | OUTPATIENT
Start: 2023-03-24 | End: 2023-03-29

## 2023-03-24 NOTE — TELEPHONE ENCOUNTER
[facility] CALLED ASKING FOR PT SCRIPT - PT IS SCHEDUELD FOR PT 3/27/23 - DISCUSSED WITH WILLIAM NO PT ORDER IN CHART - SHE ASKED DR Shamir Rushing WHO JUST DID PT SURGERY 3/16/23 - PT WILL NEED TO WAIT FOR 3/30/23 APT [facility] IS HIS 2 WEEK APT WITH US TO DISCUSS - WILLIAM GOT ON PHONE & ADVISED PT OF THIS -BA

## 2023-03-24 NOTE — TELEPHONE ENCOUNTER
Received call from patient on nurseline from patient with questions about his incision care  Returned call to patient, he asked when he could remove his bandage  This RN asked if it was his original bandage, patient confirms it is, post op call was completed on 3/23  Instructed patient to remove bandage today and shower with soap and water  Informed him that he is not to put a new bandage on in its place, he is to keep it open to air and dry  No creams, ointments, or lotions are to be applied, and he should be using clean towels and clothing  Reminded him of his appointment on 3/30 in [place] with Сергей at 1300  All questions answered at this time, he was appreciative of the call back and assistance

## 2023-03-24 NOTE — TELEPHONE ENCOUNTER
Received a call from the [address]  stating that they have an strict opioid policy and because the script has been increased to 10 mg Q 4 they will not fill the script stating they can only fill if the frequency is adjusted to Q 8  They had cancelled the script  This RN reached out to the patient and advised him of the above  Patient requested the medication be sent to the [facility] in [place]  Discussed with MELODY BURROUGHS who is in agreement with re-signing the new order to be sent to [facility]  Patient was appreciative of the call

## 2023-03-30 ENCOUNTER — OFFICE VISIT (OUTPATIENT)
Dept: NEUROSURGERY | Facility: CLINIC | Age: 63
End: 2023-03-30

## 2023-03-30 VITALS
HEART RATE: 80 BPM | WEIGHT: 193 LBS | BODY MASS INDEX: 28.58 KG/M2 | TEMPERATURE: 98 F | SYSTOLIC BLOOD PRESSURE: 126 MMHG | DIASTOLIC BLOOD PRESSURE: 84 MMHG | HEIGHT: 69 IN | OXYGEN SATURATION: 97 %

## 2023-03-30 DIAGNOSIS — G89.18 POST-OP PAIN: Primary | ICD-10-CM

## 2023-03-30 RX ORDER — OXYCODONE HYDROCHLORIDE 5 MG/1
5 TABLET ORAL EVERY 6 HOURS PRN
Qty: 30 TABLET | Refills: 0 | Status: SHIPPED | OUTPATIENT
Start: 2023-03-30 | End: 2023-04-08 | Stop reason: SDUPTHER

## 2023-03-30 NOTE — PROGRESS NOTES
Neurosurgery Office Note  Justin Anderson  58 y o  male MRN: [id number]      Assessment/Plan      Patient is a 58years old pleasant gentleman with PMHx  Of thrombocytopenia, and status post T11 L3 posterior percutaneous pedicle fixation fusion for L1 compression fracture  Patient sustained a fall from a ceiling and had burst L1 fracture with moderate central canal stenosis  Patient here today with his wife for 2 weeks follow-up  Reports his moderate sometimes severe back pain but overall his incisional pain improved from 2 weeks ago,and also noticed improvemnt from his back pain prior to the procedure  residual right groin numbness and also occasional pain, no weakness in the extremities, numbness or paresthesia  Denies bowel/bladder dysfunction or saddle anesthesia  No fever, chills, rigors, drainage or discharge from the incision site  He is wearing TLSO brace  Taking Robaxin, Tylenol, and oxycodone  Exam-A&OX3  Millie, strength 5/5 bilaterally, sensation to LT intact bilaterally  DTR 2+without clonus bilaterally  Slight limping gait, uses walker for safety  Hx, PEx, and images reviewed with the patient and his wife  Management plan discussed  Patient is feeling better, noticed improvement of his intensity of his back pain prior to the procedure, recommend continue pain medications, refill for oxycodone was sent to his pharmacy  Incisions was closed with dissolvable suture, healing well and no sign of inflammation or infection  Reminded patient for his next  6 weeks post OP visit with Dr Cari Shannon his 6 weeks follow up TL spine x-rays order  Oxycodone refill script sent to his [employer]  Answered all patient's questions/concerns  Both patient and his wife expressed their understanding's and agreed with the plan  Plan:  1  TL Incision checked and looks healing well without drainage or discharge  2   Reminded the patient F/U for the 6 weeks post OP with Dr Sariah Bolaños with "x-rays  3  Oxycodone refill sent to his pharmacy  4  Call with question or concern  I have spent a total time of 30 minutes on 03/30/23 in caring for this patient including Diagnostic results, Prognosis, Risks and benefits of tx options, Instructions for management, Patient and family education, Importance of tx compliance, Risk factor reductions, Impressions, Counseling / Coordination of care, Documenting in the medical record, Reviewing / ordering tests, medicine, procedures   and Obtaining or reviewing history    Chief Complaint   Patient presents with   • Post-op       C/C: \" 2 weeks F/U for  incision check\"    History of Present Illness   All patient's medical histories were reviewed and updated as appropriate: Allergies, current medication list, past medical history, past surgical history, family history, social history, and current medical lists  Patient is 2 weeks status post T11 L3 posterior percutaneous screw fixation fusion for traumatic burst L1 compression fracture  History of fall from a ceiling  Here today for incision check  Patient reports moderate sometimes severe incisional pain otherwise he noticed some improvement of his lumbar back pain and right groin paresthesia and pain  He denies any radicular pain in the lower extremities, numbness, weakness or paresthesia  Denies any fever, chills, rigors, drainage or discharge from the incision sites  He is wearing TLSO brace and uses walker for safety  Currently on oxycodone 5 mg every 6 as needed and also on Tylenol and Robaxin  Requests refill for oxycodone  Overall patient reports he is feeling much better  REVIEW OF SYSTEMS  Review of system personally reviewed and updated  Review of Systems   Constitutional: Negative  HENT: Negative  Eyes: Negative  Respiratory: Negative  Cardiovascular: Negative  Gastrointestinal: Negative  Endocrine: Negative  Genitourinary: Negative      Musculoskeletal: " Positive for back pain and gait problem (uses walker for assistance)  T11-L3  Lumbar/ Thoracic Posterior Fusion on 3/16/23   Wearing Brace   Skin: Negative  Allergic/Immunologic: Negative  Neurological: Positive for numbness (B/ L groin R>L)  Hematological: Negative  Psychiatric/Behavioral: Negative  All other systems reviewed and are negative  ROS obtained by MA  Reviewed  See HPI  Meds/Allergies     Current Outpatient Medications   Medication Sig Dispense Refill   • acetaminophen (TYLENOL) 325 mg tablet Take 3 tablets (975 mg total) by mouth every 8 (eight) hours  0   • bacitracin topical ointment 500 units/g topical ointment Apply 1 large application topically 3 (three) times a day 15 g 0   • calcium carbonate (TUMS) 500 mg chewable tablet Chew 2 tablets (1,000 mg total) daily as needed for indigestion or heartburn  0   • docusate sodium (COLACE) 100 mg capsule Take 1 capsule (100 mg total) by mouth 2 (two) times a day 30 capsule 0   • gabapentin (NEURONTIN) 300 mg capsule Take 1 capsule (300 mg total) by mouth 3 (three) times a day 45 capsule 0   • methocarbamol (ROBAXIN) 750 mg tablet Take 1 tablet (750 mg total) by mouth every 6 (six) hours 60 tablet 0   • polyethylene glycol (MIRALAX) 17 g packet Take 17 g by mouth daily as needed (prn constipation, 1st line agent)  0   • senna (SENOKOT) 8 6 mg Take 1 tablet (8 6 mg total) by mouth daily at bedtime 15 tablet 0     No current facility-administered medications for this visit  No Known Allergies    PAST HISTORY    Past Medical History:   Diagnosis Date   • Known health problems: none        Past Surgical History:   Procedure Laterality Date   • LUMBAR FUSION N/A 3/16/2023    Procedure: FUSION LUMBAR/THORACIC POSTERIOR PERCUTANEOUS SCREW FIXATION T11 -L3(NAVIGATED);   Surgeon: Denise Moreno MD;  Location: [facility];  Service: Neurosurgery   • ROTATOR CUFF REPAIR Right    • SHOULDER SURGERY         Social History "    Tobacco Use   • Smoking status: Never   • Smokeless tobacco: Never   Substance Use Topics   • Alcohol use: Yes     Comment: social   • Drug use: Never       Family History   Problem Relation Age of Onset   • Breast cancer Mother    • Heart disease Father    • No Known Problems Sister    • No Known Problems Brother          Above history personally reviewed  EXAM    Vitals:Blood pressure 126/84, pulse 80, temperature 98 °F (36 7 °C), temperature source Temporal, height 5' 9\" (1 753 m), weight 87 5 kg (193 lb), SpO2 97 %  ,Body mass index is 28 5 kg/m²  Physical Exam  Constitutional:       Appearance: Normal appearance  HENT:      Head: Normocephalic and atraumatic  Eyes:      Extraocular Movements: Extraocular movements intact  Cardiovascular:      Rate and Rhythm: Normal rate and regular rhythm  Musculoskeletal:         General: Tenderness present  Cervical back: Normal range of motion  Comments: Percutaneous puncture wounds looks healing well without drainage, swelling  or discharge   Neurological:      General: No focal deficit present  Mental Status: He is alert and oriented to person, place, and time  GCS: GCS eye subscore is 4  GCS verbal subscore is 5  GCS motor subscore is 6  Cranial Nerves: Cranial nerves 2-12 are intact  Sensory: Sensation is intact  Motor: Motor function is intact  Coordination: Finger-Nose-Finger Test normal       Deep Tendon Reflexes: Reflexes are normal and symmetric  Reflex Scores:       Tricep reflexes are 2+ on the right side and 2+ on the left side  Bicep reflexes are 2+ on the right side and 2+ on the left side  Brachioradialis reflexes are 2+ on the right side and 2+ on the left side  Patellar reflexes are 2+ on the right side and 2+ on the left side  Achilles reflexes are 2+ on the right side and 2+ on the left side    Psychiatric:         Speech: Speech normal          Neurologic Exam " Mental Status   Oriented to person, place, and time  Speech: speech is normal   Level of consciousness: alert    Cranial Nerves   Cranial nerves II through XII intact  CN III, IV, VI   Nystagmus: none     CN XI   CN XI normal      Motor Exam   Muscle bulk: normal  Overall muscle tone: normal  Right arm tone: normal  Left arm tone: normal  Right arm pronator drift: absent  Left arm pronator drift: absent  Right leg tone: normal  Left leg tone: normal    Sensory Exam   Light touch normal      Gait, Coordination, and Reflexes     Coordination   Finger to nose coordination: normal    Reflexes   Right brachioradialis: 2+  Left brachioradialis: 2+  Right biceps: 2+  Left biceps: 2+  Right triceps: 2+  Left triceps: 2+  Right patellar: 2+  Left patellar: 2+  Right achilles: 2+  Left achilles: 2+  Right : 2+  Left : 2+  Right Davey: absent  Left Davey: absent  Right ankle clonus: absent  Left pendular knee jerk: absent        MEDICAL DECISION MAKING    Imaging Studies:     XR chest portable    Result Date: 3/19/2023  Narrative: CHEST INDICATION:   Hypoxia  COMPARISON:  Compared with 3/15/2023 EXAM PERFORMED/VIEWS:  XR CHEST PORTABLE FINDINGS: Cardiomediastinal silhouette appears unremarkable  The lungs are clear  No pneumothorax or pleural effusion  Osseous structures appear within normal limits for patient age  Impression: No acute cardiopulmonary disease  Workstation performed: [id number]     XR chest 1 view portable    Result Date: 3/16/2023  Narrative: CHEST INDICATION:   trauma  COMPARISON:  None EXAM PERFORMED/VIEWS:  XR CHEST PORTABLE FINDINGS: Cardiomediastinal silhouette appears unremarkable  Underexpanded lungs with crowded lung markings  No pleural effusion or pneumothorax  Osseous structures appear within normal limits for patient age  Impression: No acute cardiopulmonary disease   Workstation performed: [id number]     XR spine thoracolumbar 2 vw    Result Date: 3/18/2023  Narrative: O-ARM - thoracolumbar spine INDICATION: Burst fracture of lumbar vertebra   Procedure guidance  COMPARISON:  March 15, 2023 TECHNIQUE: FLUOROSCOPY TIME:   61 10 SEC 3-D DOSE: Radiation dose length product (DLP) for this visit:  97 25 mGy   TOTAL IMAGES: 2437 FINDINGS: Fluoroscopic guidance provided for surgical procedure  Osseous and soft tissue detail limited by technique  Impression: Fluoroscopic guidance provided for surgical procedure  Please refer to the separate procedure notes for additional details  Workstation performed: [id number]     XR spine thoracolumbar 2 vw    Result Date: 3/17/2023  Narrative: THORACOLUMBAR SPINE INDICATION:   s/p perc fusion T11-L3  COMPARISON: MRI lumbar spine 3/16/2023, CT thoracolumbar spine 3/15/2023 VIEWS:  XR SPINE THORACOLUMBAR 2 VW Images: 3 FINDINGS: L1 burst fracture with mild superior endplate compression deformity, similar  Bony retropulsion evident on previous imaging is less conspicuous  Status post posterior manuel and pedicle screw fusion T11-L3, sparing the L1 level  Grade 1 spondylolisthesis L4-5  Mild degenerative changes  The pedicles appear intact  Impression: L1 burst fracture with mild superior endplate compression deformity, similar  Less conspicuous bony retropulsion with unremarkable appearance of posterior fusion T11-L3  Workstation performed: [id number]     TRAUMA - CT head wo contrast    Result Date: 3/15/2023  Narrative: CT BRAIN - WITHOUT CONTRAST INDICATION:   Headache and head trauma  COMPARISON:  None  TECHNIQUE:  CT examination of the brain was performed  In addition to axial images, sagittal and coronal 2D reformatted images were created and submitted for interpretation  Radiation dose length product (DLP) for this visit:  895 mGy-cm     This examination, like all CT scans performed in the [facility], was performed utilizing techniques to minimize radiation dose exposure, including the use of iterative reconstruction and automated exposure control  IMAGE QUALITY:  Diagnostic  FINDINGS: PARENCHYMA:  No acute intracranial hemorrhage or mass effect  VENTRICLES AND EXTRA-AXIAL SPACES:  No hydrocephalus or extra-axial  VISUALIZED ORBITS: Intact globes and orbits  PARANASAL SINUSES: Clear  CALVARIUM AND EXTRACRANIAL SOFT TISSUES:  No lytic or blastic lesion or fracture  Impression: No acute intracranial abnormality  Workstation performed: [id number]     TRAUMA - CT spine cervical wo contrast    Result Date: 3/15/2023  Narrative: CT CERVICAL SPINE - WITHOUT CONTRAST INDICATION:   Neck pain and trauma  COMPARISON:  None  TECHNIQUE:  CT examination of the cervical spine was performed without intravenous contrast   Contiguous axial images were obtained  Sagittal and coronal reconstructions were performed  Radiation dose length product (DLP) for this visit:  530 mGy-cm   This examination, like all CT scans performed in the [facility], was performed utilizing techniques to minimize radiation dose exposure, including the use of iterative reconstruction and automated exposure control  IMAGE QUALITY:  Diagnostic  FINDINGS: ALIGNMENT:  Normal alignment of the cervical spine  No subluxation  VERTEBRAE:  No acute cervical spine fracture  DEGENERATIVE CHANGES:  No significant cervical degenerative changes are noted  PREVERTEBRAL AND PARASPINAL SOFT TISSUES: No inflammation or hematoma THORACIC INLET:  Clear lung apices  Impression: No acute cervical spine fracture or traumatic malalignment  Workstation performed: [id number]     MRI lumbar spine wo contrast    Result Date: 3/15/2023  Narrative: MRI LUMBAR SPINE WITHOUT CONTRAST INDICATION: L1 burst fracture with spinal cord impingement, neurosurgery recommendation  COMPARISON:  3/15/2023  TECHNIQUE:  Multiplanar, multisequence imaging of the lumbar spine was performed      IMAGE QUALITY:  Diagnostic FINDINGS: VERTEBRAL BODIES:  There are 5 lumbar type vertebral bodies  Lumbar levoscoliosis centered at L3-4  Stable L1 burst fracture with approximately 50% loss of height centrally  Stable retropulsion of the posterior wall fracture again demonstrated traversing the right clavicle, lamina and inferior facet  SACRUM:  Limited evaluation, unremarkable  DISTAL CORD AND CONUS:  Normal signal morphology of the distal thoracic cord and conus, terminating at T12  PARASPINAL SOFT TISSUES:  T2/STIR hyperintensity in the T12-L1 interspinous ligaments  LOWER THORACIC DISC SPACES:  Mild disc degenerative change at T9-10 and T10-11 without central canal stenosis  At T11-12 there are posterior disc osteophytes and retropulsion of the posterior T12 wall  Mild central canal stenosis  No neural foraminal narrowing  At T12-L1, there are posterior disc osteophytes  Retropulsion of the T12 wall  Moderate central canal stenosis at T12 level with crowding of the cauda equina  No central canal stenosis at the T12-L1 level  LUMBAR DISC SPACES: L1-L2:  Posterior disc osteophytes  Facet and ligamentum flavum hypertrophy  No central canal stenosis  No neural foraminal narrowing  L2-L3:  Posterior disc bulge and disc osteophytes  Facet and ligamentum flavum hypertrophy  Mild central canal stenosis  Moderate to severe right neural foraminal narrowing  L3-L4:  Posterior disc bulge and disc osteophytes  Facet and ligamentum flavum hypertrophy and facet osteophytosis  Moderate central canal stenosis and crowding of the cauda equina  Encroachment of the traversing right L4 nerve roots in the subarticular zone  Mild to moderate bilateral neural foraminal narrowing  L4-L5:  Posterior disc bulge and disc osteophytes  Facet osteophytosis and ligamentum flavum hypertrophy  Moderate central canal stenosis and crowding of the cauda equina  Encroachment of the traversing L5 nerve roots in the subarticular zones    Moderate to severe right and mild left neural foraminal narrowing  L5-S1:  Posterior disc osteophytes and mild facet osteophytosis  No central canal stenosis  Mild encroachment of the traversing S1 nerve roots in the subarticular zones  Moderate right and mild left neural foraminal narrowing  OTHER FINDINGS:  None  Impression: 1  Stable L1 burst fracture  Moderate central canal stenosis at the L1 level and crowding of the cauda equina  2   Interspinous ligamentous injury at T12-L1  3  Disc and facet degenerative change throughout the mid and lower lumbar spine resulting in multilevel nerve root encroachment  Workstation performed: [id number]     XR finger right second digit-index    Result Date: 3/16/2023  Narrative: RIGHT FINGER INDICATION:   injury  COMPARISON:  None VIEWS:  XR FINGER RIGHT SECOND DIGIT-INDEX Images: 3 For the purposes of institution wide universal language the following terms will apply: (thumb=1st digit/finger, index finger=2nd digit/finger, long finger=3rd digit/finger, ring=4th digit/finger and small finger=5th digit/finger) FINDINGS: There is no acute fracture or dislocation  No significant degenerative changes  No lytic or blastic osseous lesion  Soft tissues are unremarkable  Impression: No acute osseous abnormality  Workstation performed: [id number]     XR pelvis ap only 1 or 2 vw    Result Date: 3/16/2023  Narrative: PELVIS INDICATION:   trauma  COMPARISON:  None VIEWS:  XR PELVIS AP ONLY 1 OR 2 VW Images: 2 FINDINGS: No acute fracture or hip dislocation  No significant degenerative changes  No lytic or blastic osseous lesion  Vasectomy clips  The visualized lumbar spine is unremarkable  Impression: No acute osseous abnormality  Workstation performed: [id number]     TRAUMA - CT chest abdomen pelvis w contrast    Result Date: 3/15/2023  Narrative: CT CHEST, ABDOMEN AND PELVIS WITH IV CONTRAST INDICATION:   TRAUMA  COMPARISON:  None  TECHNIQUE: CT examination of the chest, abdomen and pelvis was performed   Axial, sagittal, and coronal 2D reformatted images were created from the source data and submitted for interpretation  Radiation dose length product (DLP) for this visit:  842 mGy-cm   This examination, like all CT scans performed in the [facility], was performed utilizing techniques to minimize radiation dose exposure, including the use of iterative reconstruction and automated exposure control  IV Contrast:  100 mL of iohexol (OMNIPAQUE) Enteric Contrast: Enteric contrast was administered  FINDINGS: CHEST LUNGS:  Lungs are clear  There is no tracheal or endobronchial lesion  PLEURA:  No hemothorax  HEART/GREAT VESSELS: Normal heart size  No thoracic aortic aneurysm or dissection  MEDIASTINUM AND BRUCE:  No hematoma  CHEST WALL AND LOWER NECK:  Unremarkable  ABDOMEN LIVER/BILIARY TREE:  Hepatic steatosis  GALLBLADDER:  No calcified gallstones  No pericholecystic inflammatory change  SPLEEN:  Unremarkable  PANCREAS:  Unremarkable  ADRENAL GLANDS:  Unremarkable  KIDNEYS/URETERS:  Symmetric nephrographic phase enhancement of the kidneys  Left renal cortical cysts measuring up to 1 cm  STOMACH AND BOWEL:  Diverticulosis without evidence of diverticulitis or colitis  APPENDIX:  No findings to suggest appendicitis  ABDOMINOPELVIC CAVITY:  No free intraperitoneal air or hemoperitoneum  VESSELS:  No abdominal aortic aneurysm  PELVIS REPRODUCTIVE ORGANS:  Mild prostate enlargement  URINARY BLADDER:  Unremarkable  ABDOMINAL WALL/INGUINAL REGIONS:  Unremarkable  OSSEOUS STRUCTURES:  L1 fracture with 50% loss of vertebral body height and retropulsion of the posterior wall  Fracture extends into the right pedicle, lamina and inferior facet  Fracture of the right L1 transverse process Moderate narrowing of the central canal  No thoracic spine or rib fracture  No pelvic fracture  Impression: 1  L1 burst fracture and right L1 transverse process fracture    Moderate narrowing of the central canal  2   No evidence of acute trauma in the chest, abdomen or pelvis  I personally discussed this study with Tcashanti Vi on 3/15/2023 at 4:37 PM  Workstation performed: [id number]     CT recon only thoracolumbar (no charge)    Result Date: 3/15/2023  Narrative: CT THORACIC AND LUMBAR SPINE INDICATION:   Back pain and trauma  COMPARISON:  3/15/2023 TECHNIQUE: Axial CT examination of the thoracic and lumbar spine was obtained utilizing reconstructed images from CT of the chest abdomen and pelvis performed the same day  Images were reformatted in the sagittal and coronal planes  This examination, like all CT scans performed in the [facility], was performed utilizing techniques to minimize radiation dose exposure, including the use of iterative reconstruction and automated exposure control  FINDINGS: ALIGNMENT: Normal alignment of the thoracic spine  Lumbar levoscoliosis  VERTEBRAE:  Fracture of the L1 vertebral body  At least 50% loss of height centrally  Retropulsion of the posterior wall  Fracture extends into the right pedicle, lamina and inferior facet  Fracture of the right L1 transverse process  At least moderate narrowing of the central canal at L1  DEGENERATIVE CHANGES: Mild disc and facet degenerative change in the lower lumbar spine  At least moderate bilateral neural foraminal narrowing at L4-5 and L5-S1 PREVERTEBRAL AND PARASPINAL SOFT TISSUES: No hematoma  Impression: 1  L1 burst fracture  At least moderate central canal stenosis  2   Right L1 transverse process fracture   Workstation performed: [id number]       I have personally reviewed pertinent reports   , I have personally reviewed pertinent films in PACS and I have personally reviewed pertinent films in PACS with a Radiologist

## 2023-04-04 DIAGNOSIS — S32.001A CLOSED BURST FRACTURE OF LUMBAR VERTEBRA, INITIAL ENCOUNTER (HCC): ICD-10-CM

## 2023-04-04 RX ORDER — GABAPENTIN 300 MG/1
300 CAPSULE ORAL 3 TIMES DAILY
Qty: 45 CAPSULE | Refills: 0 | Status: SHIPPED | OUTPATIENT
Start: 2023-04-05

## 2023-04-04 RX ORDER — METHOCARBAMOL 750 MG/1
750 TABLET, FILM COATED ORAL EVERY 6 HOURS SCHEDULED
Qty: 60 TABLET | Refills: 0 | Status: SHIPPED | OUTPATIENT
Start: 2023-04-05

## 2023-04-04 NOTE — TELEPHONE ENCOUNTER
Received a call from patient requesting a refill of both his gabapentin and robaxin  Returned call to patient who stated he has enough to get him through until tomorrow  Advised we will be able to provide a refill with a first fill date of tomorrow  Confirmed his preferred pharmacy  Patient was appreciative of the call back

## 2023-04-06 ENCOUNTER — TELEPHONE (OUTPATIENT)
Dept: NEUROSURGERY | Facility: CLINIC | Age: 63
End: 2023-04-06

## 2023-04-06 ENCOUNTER — OFFICE VISIT (OUTPATIENT)
Dept: FAMILY MEDICINE CLINIC | Facility: CLINIC | Age: 63
End: 2023-04-06

## 2023-04-06 VITALS
RESPIRATION RATE: 18 BRPM | WEIGHT: 182 LBS | HEART RATE: 95 BPM | BODY MASS INDEX: 26.96 KG/M2 | TEMPERATURE: 97.7 F | DIASTOLIC BLOOD PRESSURE: 82 MMHG | SYSTOLIC BLOOD PRESSURE: 144 MMHG | HEIGHT: 69 IN | OXYGEN SATURATION: 97 %

## 2023-04-06 DIAGNOSIS — S32.012S CLOSED UNSTABLE BURST FRACTURE OF FIRST LUMBAR VERTEBRA, SEQUELA: ICD-10-CM

## 2023-04-06 DIAGNOSIS — E78.1 HYPERTRIGLYCERIDEMIA: ICD-10-CM

## 2023-04-06 DIAGNOSIS — Z76.89 ENCOUNTER FOR SUPPORT AND COORDINATION OF TRANSITION OF CARE: Primary | ICD-10-CM

## 2023-04-06 DIAGNOSIS — E55.9 VITAMIN D DEFICIENCY: ICD-10-CM

## 2023-04-06 DIAGNOSIS — R73.9 ELEVATED BLOOD SUGAR: ICD-10-CM

## 2023-04-06 DIAGNOSIS — Z13.29 THYROID DISORDER SCREEN: ICD-10-CM

## 2023-04-06 DIAGNOSIS — E87.1 HYPONATREMIA: ICD-10-CM

## 2023-04-06 DIAGNOSIS — R03.0 ELEVATED BLOOD PRESSURE READING: ICD-10-CM

## 2023-04-06 NOTE — PROGRESS NOTES
TCM Call     Date and time call was made  3/21/2023  4:19 PM    Patient was hospitialized at  [facility]    Date of Admission  03/15/23    Date of discharge  03/21/23    Diagnosis  hypoxia    Disposition  Home      TCM Call     I have advised the patient to call PCP with any new or worsening symptoms  lm for patient to call office back            Assessment/Plan:     Diagnoses and all orders for this visit:    Encounter for support and coordination of transition of care    Closed unstable burst fracture of first lumbar vertebra, sequela  Comments:  Cont wearing TLSO brace  Follow up with neurosurgery as scheduled  Start P T  per neurosurg recs  Labs as ordered  RTC 1 month    Elevated blood pressure reading  Comments:  Most likely rt pain  Monitor BP at home  RTC 2 months for review and recheck  Alarm findings reviewed    Hypertriglyceridemia  -     Lipid Panel with Direct LDL reflex; Future    Vitamin D deficiency  -     Vitamin D 25 hydroxy; Future    Hyponatremia  -     Basic metabolic panel; Future    Thyroid disorder screen  -     TSH, 3rd generation with Free T4 reflex; Future    Elevated blood sugar  -     Hemoglobin A1C; Future            Return in about 2 months (around 6/6/2023) for Recheck labs, BP, and back pain  Subjective:        Patient ID: Michelle Echeverria  is a 58 y o  male  Chief Complaint   Patient presents with   • Transition of Care Management       Patient presents for TCM  Was hospitalized from 3/15 to 3/21 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, installing the floor, 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 through the beams into the basement below  Patient diagnosed with L1 burst fracture and had difficulty with urinary retention  Is now status post T11 L3 posterior percutaneous pedicle fixation fusion  Patient's wife works from home and assists him with his medication mgmt  Denies any concerns rt same        The following portions of the patient's history were reviewed and updated as appropriate: allergies, current medications, past family history, past medical history, past social history, past surgical history and problem list     Patient Active Problem List   Diagnosis   • Shoulder joint pain   • Strain of supraspinatus muscle or tendon   • Vitamin D deficiency   • Closed unstable burst fracture of first lumbar vertebra (HCC)   • Urinary retention   • Acute midline low back pain without sciatica   • DVT prophylaxis   • Thrombocytopenia (HCC)   • Hypoxia       Current Outpatient Medications   Medication Sig Dispense Refill   • acetaminophen (TYLENOL) 325 mg tablet Take 3 tablets (975 mg total) by mouth every 8 (eight) hours  0   • docusate sodium (COLACE) 100 mg capsule Take 1 capsule (100 mg total) by mouth 2 (two) times a day 30 capsule 0   • gabapentin (NEURONTIN) 300 mg capsule Take 1 capsule (300 mg total) by mouth 3 (three) times a day Do not start before April 5, 2023  45 capsule 0   • methocarbamol (ROBAXIN) 750 mg tablet Take 1 tablet (750 mg total) by mouth every 6 (six) hours Do not start before April 5, 2023  60 tablet 0   • oxyCODONE (Roxicodone) 5 immediate release tablet Take 1 tablet (5 mg total) by mouth every 6 (six) hours as needed for moderate pain Max Daily Amount: 20 mg 30 tablet 0   • polyethylene glycol (MIRALAX) 17 g packet Take 17 g by mouth daily as needed (prn constipation, 1st line agent)  0   • senna (SENOKOT) 8 6 mg Take 1 tablet (8 6 mg total) by mouth daily at bedtime 15 tablet 0     No current facility-administered medications for this visit  Past Medical History:   Diagnosis Date   • Known health problems: none         Past Surgical History:   Procedure Laterality Date   • LUMBAR FUSION N/A 3/16/2023    Procedure: FUSION LUMBAR/THORACIC POSTERIOR PERCUTANEOUS SCREW FIXATION T11 -L3(NAVIGATED);   Surgeon: Rhea Myers MD;  Location: [facility];  Service: Neurosurgery   • ROTATOR CUFF REPAIR Right    • SHOULDER SURGERY          Social History Socioeconomic History   • Marital status: /Civil Union     Spouse name: Not on file   • Number of children: Not on file   • Years of education: Not on file   • Highest education level: Not on file   Occupational History   • Not on file   Tobacco Use   • Smoking status: Never   • Smokeless tobacco: Never   Vaping Use   • Vaping Use: Not on file   Substance and Sexual Activity   • Alcohol use: Yes     Comment: social   • Drug use: Never   • Sexual activity: Never     Partners: Female, Male   Other Topics Concern   • Not on file   Social History Narrative   • Not on file     Social Determinants of Health     Financial Resource Strain: Not on file   Food Insecurity: No Food Insecurity   • Worried About Running Out of Food in the Last Year: Never true   • Ran Out of Food in the Last Year: Never true   Transportation Needs: No Transportation Needs   • Lack of Transportation (Medical): No   • Lack of Transportation (Non-Medical): No   Physical Activity: Not on file   Stress: Not on file   Social Connections: Not on file   Intimate Partner Violence: Not on file   Housing Stability: Low Risk    • Unable to Pay for Housing in the Last Year: No   • Number of Places Lived in the Last Year: 1   • Unstable Housing in the Last Year: No         Review of Systems   Constitutional: Negative for appetite change, fatigue and unexpected weight change  HENT: Negative for nosebleeds and trouble swallowing  Respiratory: Negative for cough and shortness of breath  Cardiovascular: Negative for chest pain, palpitations and leg swelling  Gastrointestinal: Negative for abdominal pain, blood in stool, constipation, diarrhea and nausea  Endocrine: Negative for polydipsia, polyphagia and polyuria  Genitourinary: Negative for difficulty urinating, dysuria, frequency, hematuria and urgency  Musculoskeletal: Positive for back pain and gait problem  Negative for arthralgias and myalgias     Skin: Negative for color change and "rash    Neurological: Negative for dizziness, syncope, weakness, light-headedness and headaches  Psychiatric/Behavioral: Negative for agitation, confusion, self-injury, sleep disturbance and suicidal ideas  Objective:      /82   Pulse 95   Temp 97 7 °F (36 5 °C)   Resp 18   Ht 5' 9\" (1 753 m)   Wt 82 6 kg (182 lb)   SpO2 97%   BMI 26 88 kg/m²          Physical Exam  Vitals and nursing note reviewed  Constitutional:       Appearance: Normal appearance  HENT:      Head: Normocephalic and atraumatic  Nose: Nose normal       Mouth/Throat:      Mouth: Mucous membranes are moist    Eyes:      Conjunctiva/sclera: Conjunctivae normal    Cardiovascular:      Rate and Rhythm: Normal rate and regular rhythm  Pulmonary:      Effort: Pulmonary effort is normal       Breath sounds: Normal breath sounds  Abdominal:      General: Abdomen is flat  Bowel sounds are normal       Palpations: Abdomen is soft  Genitourinary:     Comments: Exam deferred  Musculoskeletal:      Comments: Presents in TLSO brace with walker   Skin:     General: Skin is warm and dry  Capillary Refill: Capillary refill takes less than 2 seconds  Neurological:      Mental Status: He is alert and oriented to person, place, and time  Gait: Gait abnormal       Deep Tendon Reflexes:      Reflex Scores:       Patellar reflexes are 2+ on the right side and 2+ on the left side    Psychiatric:         Mood and Affect: Mood normal          Behavior: Behavior normal          "

## 2023-04-06 NOTE — TELEPHONE ENCOUNTER
Received a call from patient requesting a call back regarding his oxycodone script  Returned call to patient who stated he has about 10 tablets left and is questioning if he will get a refill when he completes this course of medication  Advised the medication gets refilled for up to 6 weeks postop and the closer to the 6 week point we try to wean patients off of the medication  Advised once he completes the medication as prescribed he can call the office for a refill should he feel that he needs it to control his pain  He stated an understanding and was appreciative of the call back

## 2023-04-08 ENCOUNTER — TELEPHONE (OUTPATIENT)
Dept: NEUROSURGERY | Facility: CLINIC | Age: 63
End: 2023-04-08

## 2023-04-08 DIAGNOSIS — G89.18 POST-OP PAIN: ICD-10-CM

## 2023-04-08 RX ORDER — OXYCODONE HYDROCHLORIDE 5 MG/1
5 TABLET ORAL EVERY 8 HOURS PRN
Qty: 28 TABLET | Refills: 0 | Status: SHIPPED | OUTPATIENT
Start: 2023-04-08

## 2023-04-08 NOTE — TELEPHONE ENCOUNTER
Patient called in stating that he only has two pills left and needs a refill as soon as possible  Paged on call Irineo Munoz via

## 2023-04-08 NOTE — PROGRESS NOTES
Patient called for refill of oxycodone  He is 4 week s/p lumbar fusion  Advised that we will start weaning medication, will fill every 8 hours PRN  He was agreeable

## 2023-04-17 DIAGNOSIS — E55.9 VITAMIN D DEFICIENCY: Primary | ICD-10-CM

## 2023-04-17 RX ORDER — ERGOCALCIFEROL 1.25 MG/1
50000 CAPSULE ORAL WEEKLY
Qty: 8 CAPSULE | Refills: 0 | Status: SHIPPED | OUTPATIENT
Start: 2023-04-17 | End: 2023-06-14 | Stop reason: SDUPTHER

## 2023-04-18 DIAGNOSIS — E78.5 HYPERLIPIDEMIA, UNSPECIFIED HYPERLIPIDEMIA TYPE: Primary | ICD-10-CM

## 2023-04-18 RX ORDER — ROSUVASTATIN CALCIUM 5 MG/1
5 TABLET, COATED ORAL DAILY
Qty: 30 TABLET | Refills: 0 | Status: SHIPPED | OUTPATIENT
Start: 2023-04-18 | End: 2023-06-14 | Stop reason: SDUPTHER

## 2023-05-11 ENCOUNTER — TELEPHONE (OUTPATIENT)
Dept: NEUROSURGERY | Facility: CLINIC | Age: 63
End: 2023-05-11

## 2023-05-11 NOTE — TELEPHONE ENCOUNTER
Received a call from patient requesting a copy of his last office note  Attempted to return call to patient however he did not answer  Left  encouraging a call back at his convenience and provided office number

## 2023-05-19 ENCOUNTER — RA CDI HCC (OUTPATIENT)
Dept: OTHER | Facility: HOSPITAL | Age: 63
End: 2023-05-19

## 2023-05-19 NOTE — PROGRESS NOTES
[facility]  coding opportunities       Chart reviewed, no opportunity found: CHART REVIEWED, NO OPPORTUNITY FOUND      This is a reminder to address ALL HCC (risk adjustment) codes as found on active problem list for 2023 as patient scores reset to zero OUMAR      Patients Insurance        Commercial Insurance: [address] [place] [address]

## 2023-06-06 ENCOUNTER — APPOINTMENT (OUTPATIENT)
Dept: RADIOLOGY | Facility: MEDICAL CENTER | Age: 63
End: 2023-06-06
Payer: COMMERCIAL

## 2023-06-06 ENCOUNTER — OFFICE VISIT (OUTPATIENT)
Dept: NEUROSURGERY | Facility: CLINIC | Age: 63
End: 2023-06-06

## 2023-06-06 VITALS
HEART RATE: 87 BPM | WEIGHT: 178 LBS | HEIGHT: 69 IN | DIASTOLIC BLOOD PRESSURE: 85 MMHG | TEMPERATURE: 98.2 F | SYSTOLIC BLOOD PRESSURE: 147 MMHG | BODY MASS INDEX: 26.36 KG/M2

## 2023-06-06 DIAGNOSIS — S32.001A CLOSED BURST FRACTURE OF LUMBAR VERTEBRA, INITIAL ENCOUNTER (HCC): Primary | ICD-10-CM

## 2023-06-06 DIAGNOSIS — S32.001A CLOSED BURST FRACTURE OF LUMBAR VERTEBRA, INITIAL ENCOUNTER (HCC): ICD-10-CM

## 2023-06-06 DIAGNOSIS — G89.18 POST-OP PAIN: ICD-10-CM

## 2023-06-06 PROCEDURE — 99024 POSTOP FOLLOW-UP VISIT: CPT | Performed by: NEUROLOGICAL SURGERY

## 2023-06-06 PROCEDURE — 72080 X-RAY EXAM THORACOLMB 2/> VW: CPT

## 2023-06-06 NOTE — PROGRESS NOTES
Review of Systems   Constitutional: Negative  HENT: Negative  Eyes: Negative  Respiratory: Negative  Cardiovascular: Negative  Gastrointestinal: Negative  Endocrine: Negative  Genitourinary: Negative  Musculoskeletal: Negative  11 Wk   P/O Fusion Lumbar/thoracic Posterior Percutaneous Screw Fixation T11 -l3(navigated) on 3/16/2023      L/S XR done 6/6/23     Patient is very happy with surgery  Patient has Zero pain today just minor discomfort  Wearing brace which is helping with not bending  Skin: Negative  Allergic/Immunologic: Negative  Neurological: Negative  Hematological: Negative  Psychiatric/Behavioral: Negative  All other systems reviewed and are negative

## 2023-06-06 NOTE — PROGRESS NOTES
82 status post Fusion Lumbar/thoracic Posterior Percutaneous Screw Fixation T11 -l3(navigated) on 3/16/2023     Wounds: healed    Medications: gabapentin, tylenol, robaxin    X-ray Images and Report of the Thoracic and Lumbar show intact hardware  No change in fx    A/P Doing well  OK to discontinue brace, may wear as needed  OK to start NSAIDs like ibuprofen or naproxen for ongoing discomfort  PT ordered per patient request   He can follow up as needed with neurosurgery  He should address ongoing medication and disability needs with his primary  He should be able to return to work  He would like to start PT prior    All questions answered

## 2023-06-14 ENCOUNTER — OFFICE VISIT (OUTPATIENT)
Dept: FAMILY MEDICINE CLINIC | Facility: CLINIC | Age: 63
End: 2023-06-14
Payer: COMMERCIAL

## 2023-06-14 VITALS
SYSTOLIC BLOOD PRESSURE: 146 MMHG | DIASTOLIC BLOOD PRESSURE: 90 MMHG | WEIGHT: 186 LBS | HEART RATE: 80 BPM | RESPIRATION RATE: 18 BRPM | HEIGHT: 69 IN | OXYGEN SATURATION: 97 % | BODY MASS INDEX: 27.55 KG/M2 | TEMPERATURE: 99 F

## 2023-06-14 DIAGNOSIS — S32.001A CLOSED BURST FRACTURE OF LUMBAR VERTEBRA, INITIAL ENCOUNTER (HCC): ICD-10-CM

## 2023-06-14 DIAGNOSIS — E55.9 VITAMIN D DEFICIENCY: ICD-10-CM

## 2023-06-14 DIAGNOSIS — E78.5 HYPERLIPIDEMIA, UNSPECIFIED HYPERLIPIDEMIA TYPE: ICD-10-CM

## 2023-06-14 DIAGNOSIS — Z98.1 S/P LUMBAR SPINAL FUSION: Primary | ICD-10-CM

## 2023-06-14 PROCEDURE — 99213 OFFICE O/P EST LOW 20 MIN: CPT | Performed by: FAMILY MEDICINE

## 2023-06-14 RX ORDER — GABAPENTIN 300 MG/1
300 CAPSULE ORAL 3 TIMES DAILY
Qty: 90 CAPSULE | Refills: 0 | Status: SHIPPED | OUTPATIENT
Start: 2023-06-14

## 2023-06-14 RX ORDER — ERGOCALCIFEROL 1.25 MG/1
50000 CAPSULE ORAL WEEKLY
Qty: 8 CAPSULE | Refills: 0 | Status: SHIPPED | OUTPATIENT
Start: 2023-06-14

## 2023-06-14 RX ORDER — METHOCARBAMOL 750 MG/1
750 TABLET, FILM COATED ORAL EVERY 6 HOURS PRN
Qty: 28 TABLET | Refills: 0 | Status: SHIPPED | OUTPATIENT
Start: 2023-06-14 | End: 2023-06-28

## 2023-06-14 RX ORDER — ROSUVASTATIN CALCIUM 10 MG/1
10 TABLET, COATED ORAL DAILY
Qty: 30 TABLET | Refills: 0 | Status: SHIPPED | OUTPATIENT
Start: 2023-06-14 | End: 2023-07-14

## 2023-06-14 NOTE — PROGRESS NOTES
Assessment/Plan:     Problem List Items Addressed This Visit        Other    Vitamin D deficiency    Relevant Medications    ergocalciferol (VITAMIN D2) 50,000 units    Other Relevant Orders    Vitamin D 25 hydroxy   Other Visit Diagnoses     S/P lumbar spinal fusion    -  Primary    Relevant Medications    gabapentin (NEURONTIN) 300 mg capsule    methocarbamol (ROBAXIN) 750 mg tablet    Closed burst fracture of lumbar vertebra, initial encounter ([facility])        Relevant Medications    gabapentin (NEURONTIN) 300 mg capsule    methocarbamol (ROBAXIN) 750 mg tablet    Hyperlipidemia, unspecified hyperlipidemia type        Relevant Medications    rosuvastatin (CRESTOR) 10 MG tablet            Patient is doing well from a back standpoint  Patient can continue to use ibuprofen along with gabapentin for symptomatic relief  Physical therapy is also to be initiated as well  We will follow with neuro as needed but is stable overall  Otherwise I refilled patient's Crestor and vitamin D supplements  Patient also had refills on the gabapentin and Robaxin  Blood pressure slightly elevated likely secondary to back pain  We will continue to monitor that  Patient might need BP medications in the future if BP still elevated over the next few months  Follow up in 1 month    Subjective:      Patient ID: Anna Pisano  is a 61 y o  male presents for follow-up visit  Patient sustained a fall from a ceiling and had abrasion at L1 fracture with moderate central canal stenosis  Patient is status post T11-L3 posterior percutaneous pedicle fixation fusion for L1 compression fracture  Patient was following with Dr Sita Huff the neurosurgical team   Patient states there is improved with back pain after procedure  Medications he is taking is also helping  Patient denies bowel bladder incontinence, saddle anesthesia  Patient denies weakness in the lower extremities  No evidence of numbness or tingling sensation    Patient denies "systemic symptoms as well  Patient has been taking gabapentin along with Robaxin and Tylenol for symptomatic relief  Patient was released from neurosurgical care and will be transitioned to  primary care  Recent x-rays of the back indicate a stable fracture of the L1 stable hardware without evidence of complications  Patient also wants refills for his vitamin D and cholesterol medications  He has no chest pain, shortness of breath, fevers, chills, night sweats  Doing well overall  No acute complaints    HPI    The following portions of the patient's history were reviewed and updated as appropriate: allergies, current medications, past family history, past medical history, past social history, past surgical history and problem list     Review of Systems   Constitutional: Negative for chills and fever  HENT: Negative for ear pain and sore throat  Eyes: Negative for pain and visual disturbance  Respiratory: Negative for cough and shortness of breath  Cardiovascular: Negative for chest pain and palpitations  Gastrointestinal: Negative for abdominal pain and vomiting  Genitourinary: Negative for dysuria and hematuria  Musculoskeletal: Negative for arthralgias and back pain  Skin: Negative for color change and rash  Neurological: Negative for seizures and syncope  All other systems reviewed and are negative  Objective:    /90   Pulse 80   Temp 99 °F (37 2 °C)   Resp 18   Ht 5' 9\" (1 753 m)   Wt 84 4 kg (186 lb)   SpO2 97%   BMI 27 47 kg/m²        Physical Exam  Constitutional:       Appearance: Normal appearance  HENT:      Head: Normocephalic and atraumatic  Right Ear: Tympanic membrane and ear canal normal       Left Ear: Tympanic membrane and ear canal normal    Cardiovascular:      Rate and Rhythm: Normal rate and regular rhythm  Pulses: Normal pulses  Heart sounds: Normal heart sounds     Pulmonary:      Effort: Pulmonary effort is normal       Breath " sounds: Normal breath sounds  Abdominal:      General: Abdomen is flat  Palpations: Abdomen is soft  Musculoskeletal:         General: Tenderness present  Normal range of motion  Comments: Tenderness in the lumbar region status post lumbar spinal fusion    Skin:     General: Skin is warm  Capillary Refill: Capillary refill takes less than 2 seconds  Coloration: Skin is not jaundiced or pale  Findings: No bruising or erythema  Neurological:      General: No focal deficit present  Mental Status: He is alert and oriented to person, place, and time  Cranial Nerves: No cranial nerve deficit  Sensory: No sensory deficit  Motor: No weakness        Coordination: Coordination normal       Gait: Gait normal       Deep Tendon Reflexes: Reflexes normal    Psychiatric:         Mood and Affect: Mood normal          Behavior: Behavior normal

## 2023-06-15 ENCOUNTER — EVALUATION (OUTPATIENT)
Dept: PHYSICAL THERAPY | Facility: CLINIC | Age: 63
End: 2023-06-15
Payer: COMMERCIAL

## 2023-06-15 DIAGNOSIS — S32.001A CLOSED BURST FRACTURE OF LUMBAR VERTEBRA, INITIAL ENCOUNTER (HCC): ICD-10-CM

## 2023-06-15 PROCEDURE — 97112 NEUROMUSCULAR REEDUCATION: CPT | Performed by: PHYSICAL THERAPIST

## 2023-06-15 PROCEDURE — 97161 PT EVAL LOW COMPLEX 20 MIN: CPT | Performed by: PHYSICAL THERAPIST

## 2023-06-15 NOTE — PROGRESS NOTES
PT Evaluation     Today's date: 6/15/2023  Patient name: Hailee Patel  : 1960  MRN: [id number]  Referring provider: Kalyn Xiong,*  Dx:   Encounter Diagnosis     ICD-10-CM    1  Closed burst fracture of lumbar vertebra, initial encounter [facility])  S32 001A Ambulatory referral to Physical Therapy                     Assessment  Assessment details: Hailee Patel  is a 61 y o  male who presents with pain, decreased strength and decreased ROM  Due to these impairments, patient has difficulty performing ADL's, recreational activities, work-related activities, engaging in social activities  Patient's clinical presentation is consistent with their referring diagnosis of Closed burst fracture of lumbar vertebra, initial encounter [facility])  Plan: Ambulatory referral to Physical Therapy    Patient has been educated in home exercise program and plan of care  Patient would benefit from skilled physical therapy services to address their aforementioned functional limitations and progress towards prior level of function and independence with home exercise program      Impairments: abnormal muscle firing, abnormal or restricted ROM, activity intolerance, impaired physical strength, lacks appropriate home exercise program and pain with function  Understanding of Dx/Px/POC: good   Prognosis: good    Goals  Short Term Goals:    1  Initiate and advance HEP  2  AROM Lumbar Flexion 75 degrees  3  MMT Core strength 4/5    Long Term Goals:    1  Indep with HEP  2  David Socks  3   Roll Over in bed    Plan  Patient would benefit from: skilled PT  Planned modality interventions: cryotherapy, electrical stimulation/Russian stimulation and thermotherapy: hydrocollator packs  Planned therapy interventions: joint mobilization, manual therapy, patient education, postural training, activity modification, abdominal trunk stabilization, body mechanics training, flexibility, functional ROM exercises, graded exercise, home exercise program, neuromuscular re-education, strengthening, stretching, therapeutic activities, therapeutic exercise, motor coordination training, muscle pump exercises, gait training, balance/weight bearing training and ADL training  Frequency: 2x week  Duration in weeks: 6  Treatment plan discussed with: patient        Subjective Evaluation    History of Present Illness  Mechanism of injury: Pt reports on 2023 falling through the floor of his apartment building  Landing in the plastic shower of the apartment below bursting his L5  Pt with lumbar fusion the following day  Pain  Current pain ratin  At best pain ratin  At worst pain ratin  Quality: dull ache and throbbing  Relieving factors: medications and rest      Diagnostic Tests  X-ray: abnormal  Treatments  Previous treatment: medication  Current treatment: immobilization, medication and physical therapy  Patient Goals  Patient goals for therapy: decreased pain, increased motion, increased strength, independence with ADLs/IADLs, return to sport/leisure activities and return to work          Objective     Postural Observations  Seated posture:   Active Range of Motion     Lumbar   Flexion: 55 degrees   Extension: 13 degrees   Left lateral flexion: 14 degrees       Right lateral flexion: 26 degrees   Left Hip   Flexion: 100 degrees   Extension: -8 degrees   External rotation (90/90): [facility]  Internal rotation (90/90): WFL    Right Hip   Flexion: 103 degrees   Extension: -13 degrees   External rotation (90/90): [facility]  Internal rotation (90/90):  [facility]    Strength/Myotome Testing     Left Hip   Planes of Motion   Flexion: 4-  Extension: 2-  Abduction: 2+  External rotation: 4  Internal rotation: 4-    Right Hip   Planes of Motion   Flexion: 4-  Extension: 2-  Abduction: 2+  External rotation: 4  Internal rotation: 4-    Additional Strength Details  Core:  <3/5    Tests     Lumbar     Left   Negative slump test      Right   Negative slump "test      Left Hip   Negative Ely's and long sit  Right Hip   Negative Ely's and long sit       Additional Tests Details  Repeated Motion Test:  Flexion: no change  Extension: no change  Hamstring Flexibility:  R:-46  L: -42                   Precautions: Hx of DVT, Surgery 3/16/2023 T11-L3 Fusion      Manuals 6/15        MFR to thoracic and lumba                                    Neuro Re-Ed         SHAN 20x        PPU         SAG's         TA Set 10\" hold 10x        TA Set with SLR         TA Set with Marching         TA Set with Squat         [facility]         DKTC                  Ther Ex         Rec Bike         Hamstring Stretch 30\" hold         Hip 4-way         [facility]                                    Ther Activity                           Gait Training                           Modalities                              "

## 2023-06-20 ENCOUNTER — OFFICE VISIT (OUTPATIENT)
Dept: PHYSICAL THERAPY | Facility: CLINIC | Age: 63
End: 2023-06-20
Payer: COMMERCIAL

## 2023-06-20 DIAGNOSIS — S32.001A CLOSED BURST FRACTURE OF LUMBAR VERTEBRA, INITIAL ENCOUNTER (HCC): Primary | ICD-10-CM

## 2023-06-20 PROCEDURE — 97112 NEUROMUSCULAR REEDUCATION: CPT | Performed by: PHYSICAL THERAPIST

## 2023-06-20 PROCEDURE — 97140 MANUAL THERAPY 1/> REGIONS: CPT | Performed by: PHYSICAL THERAPIST

## 2023-06-20 PROCEDURE — 97110 THERAPEUTIC EXERCISES: CPT | Performed by: PHYSICAL THERAPIST

## 2023-06-20 NOTE — PROGRESS NOTES
"Daily Note     Today's date: 2023  Patient name: Yogi Rizvi  : 1960  MRN: [id number]  Referring provider: Spike Bonds,*  Dx:   Encounter Diagnosis     ICD-10-CM    1  Closed burst [facility]  S37 [id number]                      Subjective: Pt reports centralizing pain in his low back      Objective: See treatment diary below      Assessment: Tolerated treatment well   Patient requires VC's to correct form on SHAN, and PPU      Plan: Add TA Set with SLR     Precautions: Hx of DVT, Surgery 3/16/2023 T11-L3 Fusion      Manuals 6/15 6/20       MFR to thoracic and lumbar  15'                                  Neuro Re-Ed         SHAN 20x 10x       PPU  30x       SAG's         TA Set 10\" hold 10x 10x       TA Set with SLR   ADD      TA Set with Marching         TA Set with Squat         SKTC         DKTC         Multifidus Set     ADD               Ther Ex         Rec Bike  6 min L1       Hamstring Stretch 30\" hold  3x       Hip 4-way         Star Lunges         UTR  10x                                  Ther Activity                           Gait Training                           Modalities                                "

## 2023-06-22 ENCOUNTER — OFFICE VISIT (OUTPATIENT)
Dept: PHYSICAL THERAPY | Facility: CLINIC | Age: 63
End: 2023-06-22
Payer: COMMERCIAL

## 2023-06-22 DIAGNOSIS — S32.001A CLOSED BURST FRACTURE OF LUMBAR VERTEBRA, INITIAL ENCOUNTER (HCC): Primary | ICD-10-CM

## 2023-06-22 PROCEDURE — 97112 NEUROMUSCULAR REEDUCATION: CPT | Performed by: PHYSICAL THERAPIST

## 2023-06-22 PROCEDURE — 97140 MANUAL THERAPY 1/> REGIONS: CPT | Performed by: PHYSICAL THERAPIST

## 2023-06-22 PROCEDURE — 97110 THERAPEUTIC EXERCISES: CPT | Performed by: PHYSICAL THERAPIST

## 2023-06-22 NOTE — PROGRESS NOTES
"Daily Note     Today's date: 2023  Patient name: Ramiro Alaniz  : 1960  MRN: [id number]  Referring provider: Pamelia Bloch,*  Dx:   Encounter Diagnosis     ICD-10-CM    1  [facility] lumbar [facility]  S32 001R                      Subjective: Pt reports he feels like \"things are starting to loosen\"      Objective: See treatment diary below      Assessment: Tolerated treatment well   Patient requires VC's to correct form on numerous exercises      Plan: Add TA Set with marching and SKTC     Precautions: Hx of DVT, Surgery 3/16/2023 T11-L3 Fusion      Manuals 6/15 6/20 6/22      MFR to thoracic and lumbar  15' 15'                                 Neuro Re-Ed         SHAN 20x 10x 10x      PPU  30x 30x      SAG's         TA Set 10\" hold 10x 10x 10x      TA Set with SLR   30x      TA Set with Marching    ADD     TA Set with Squat         SKTC      ADD     DKTC         Multifidus Set     20x               Ther Ex         Rec Bike  6 min L1 6min L2      Hamstring Stretch 30\" hold  3x 3x      Hip 4-way         Star Lunges         UTR  10x 10x                                 Ther Activity                           Gait Training                           Modalities                                "

## 2023-06-27 ENCOUNTER — OFFICE VISIT (OUTPATIENT)
Dept: PHYSICAL THERAPY | Facility: CLINIC | Age: 63
End: 2023-06-27
Payer: COMMERCIAL

## 2023-06-27 DIAGNOSIS — S32.001A CLOSED BURST FRACTURE OF LUMBAR VERTEBRA, INITIAL ENCOUNTER (HCC): Primary | ICD-10-CM

## 2023-06-27 PROCEDURE — 97110 THERAPEUTIC EXERCISES: CPT

## 2023-06-27 PROCEDURE — 97112 NEUROMUSCULAR REEDUCATION: CPT

## 2023-06-27 PROCEDURE — 97140 MANUAL THERAPY 1/> REGIONS: CPT

## 2023-06-27 NOTE — PROGRESS NOTES
"Daily Note     Today's date: 2023  Patient name: Irma Blizzard  : 1960  MRN: [id number]  Referring provider: Jose Ness,*  Dx:   Encounter Diagnosis     ICD-10-CM    1  Closed burst fracture of lumbar vertebra, initial encounter ([facility] [place] [facility] )  [id number] [id number]                      Subjective: patient stated he feels like his back has been \"widened out\" since [employer] work on him last visit  Overall he said he feels good today  Objective: See treatment diary below      Assessment: Patient tolerated treatment well  No exacerbation of sx with program  Patient able to complete all tasks assigned with good execution  Patient would benefit from continued therapy to decrease pain and increase strength and flexibility to improve LOF  Plan: Continue per plan of care  Progress treatment as tolerated         Precautions: Hx of DVT, Surgery 3/16/2023 T11-L3 Fusion      Manuals 6/15 6/20 6/22 6/27     MFR to thoracic and lumbar  15' 15' 15'                                Neuro Re-Ed         SHAN 20x 10x 10x 10x     PPU  30x 30x 30x     SAG's         TA Set 10\" hold 10x 10x 10x 10x     TA Set with SLR   30x 30x     TA Set with Marching    20x     TA Set with Squat         SKTC      ADD ADD    DKTC         Multifidus Set     20x 20x              Ther Ex         Rec Bike  6 min L1 6min L2 6 min L2     Hamstring Stretch 30\" hold  3x 3x 3x     Hip 4-way         Star Lunges         UTR on physio ball  10x 10x 10x                                Ther Activity                           Gait Training                           Modalities                                "

## 2023-06-29 ENCOUNTER — OFFICE VISIT (OUTPATIENT)
Dept: PHYSICAL THERAPY | Facility: CLINIC | Age: 63
End: 2023-06-29
Payer: COMMERCIAL

## 2023-06-29 DIAGNOSIS — S32.001A CLOSED BURST FRACTURE OF LUMBAR VERTEBRA, INITIAL ENCOUNTER (HCC): Primary | ICD-10-CM

## 2023-06-29 PROCEDURE — 97140 MANUAL THERAPY 1/> REGIONS: CPT

## 2023-06-29 PROCEDURE — 97112 NEUROMUSCULAR REEDUCATION: CPT

## 2023-06-29 PROCEDURE — 97110 THERAPEUTIC EXERCISES: CPT

## 2023-06-29 NOTE — PROGRESS NOTES
"Daily Note     Today's date: 2023  Patient name: Ina Swanson  : 1960  MRN: [id number]  Referring provider: Alla Black,*  Dx:   Encounter Diagnosis     ICD-10-CM    1  Closed burst fracture of lumbar vertebra, initial encounter ([facility] [address] )  S32 001R                      Subjective: patient stated his back is getting better but still feels it  Objective: See treatment diary below      Assessment: patient has difficulty moving from supine to prone and prone to supine  Patient able to complete all tasks assigned with good execution and no exacerbation of pain         Plan: recommend adding LTR and open door to next visit      Precautions: Hx of DVT, Surgery 3/16/2023 T11-L3 Fusion      Manuals 6/15 6/20 6/22 6/27 6/29    MFR to thoracic and lumbar  15' 15' 15' 15'                               Neuro Re-Ed         SHAN 20x 10x 10x 10x 10x    PPU  30x 30x 30x 30x    SAG's         TA Set 10\" hold 10x 10x 10x 10x 10x    TA Set with SLR   30x 30x 2x15    TA Set with Marching    20x 20x    TA Set with Squat         SKTC 30\" hd       ADD 3x    DKTC         Multifidus Set     20x 20x 30x             Ther Ex         Rec Bike  6 min L1 6min L2 6 min L2 6 min L2    Hamstring Stretch 30\" hold  3x 3x 3x 3x    Hip 4-way         Star Lunges         UTR on physio ball  10x 10x 10x 15x                               Ther Activity                           Gait Training                           Modalities                                "

## 2023-07-07 ENCOUNTER — TELEPHONE (OUTPATIENT)
Dept: NEUROSURGERY | Facility: CLINIC | Age: 63
End: 2023-07-07

## 2023-07-07 NOTE — TELEPHONE ENCOUNTER
7/7/23 received return to work documents from [employer] emailed to [employer] to have completed and scanned to chart.

## 2023-07-10 ENCOUNTER — TELEPHONE (OUTPATIENT)
Dept: NEUROSURGERY | Facility: CLINIC | Age: 63
End: 2023-07-10

## 2023-07-10 NOTE — TELEPHONE ENCOUNTER
Lilo Post emailed [employer] for duty/Return to work form on 7/7/23 from [employer]. I spoke with patient today asking when did he returned to work and he stated he still hasn't returned because he was doing physical therapy and because his job needs a note stating he can return to work. I notified patient  that he was cleared at the last visit w/ Dr Candelaria Crenshaw on 6/6/23. No forms were given at the time of that appointment. I did fill out form stating he can return to work on 7/11/23 and any further time off or forms that need to be filled out by his primary care. Patient verbalized understanding. A copy was faxed to [employer] and mailed to patient.     [employer] P# [phone],  F# [phone]

## 2023-07-11 ENCOUNTER — OFFICE VISIT (OUTPATIENT)
Dept: PHYSICAL THERAPY | Facility: CLINIC | Age: 63
End: 2023-07-11
Payer: COMMERCIAL

## 2023-07-11 DIAGNOSIS — S32.001A CLOSED BURST FRACTURE OF LUMBAR VERTEBRA, INITIAL ENCOUNTER (HCC): Primary | ICD-10-CM

## 2023-07-11 PROCEDURE — 97140 MANUAL THERAPY 1/> REGIONS: CPT

## 2023-07-11 PROCEDURE — 97112 NEUROMUSCULAR REEDUCATION: CPT

## 2023-07-11 PROCEDURE — 97110 THERAPEUTIC EXERCISES: CPT

## 2023-07-11 NOTE — PROGRESS NOTES
Daily Note     Today's date: 2023  Patient name: Ting Garcia. : 1960  MRN: [id number]  Referring provider: Mandy Cuenca,*  Dx:   Encounter Diagnosis     ICD-10-CM    1. Closed burst fracture of lumbar vertebra, initial encounter ([address])  S32.001A                      Subjective: patient stated he was definitely getting better but still has difficulty rolling over in bed. Lionel Benz reported turning to left is difficult       Objective: See treatment diary below      Assessment: added low trunk rotation and open door exercises to program to improve trunk flexibility. Patient tolerated treatment well. No exacerbation of sx with program. Patient able to complete all tasks assigned with good execution. Unable to complete full program due to time constraints. Patient would benefit from continued therapy to decrease pain and increase strength and flexibility to improve LOF. Plan: Continue per plan of care. Progress treatment as tolerated.        Precautions: Hx of DVT, Surgery 3/16/2023 T11-L3 Fusion      Manuals 6/15 6/20 6/22 6/27 6/29 7/11   MFR to thoracic and lumbar  15' 15' 15' 15' 10'                              Neuro Re-Ed         SHAN 20x 10x 10x 10x 10x nt   PPU  30x 30x 30x 30x nt   SAG's         TA Set 10" hold 10x 10x 10x 10x 10x 10     TA Set with SLR   30x 30x 2x15 2x15   TA Set with Marching    20x 20x 20x   TA Set with Squat         SKTC 30" hd       ADD 3x 3x   DKTC         Multifidus Set     20x 20x 30x nt            Ther Ex         Rec Bike  6 min L1 6min L2 6 min L2 6 min L2 6 min L3   Hamstring Stretch 30" hold  3x 3x 3x 3x 3x   Hip 4-way         Star Lunges         UTR on physio ball  10x 10x 10x 15x 15x yellow medicine ball   LTR 30" hd       3x   Open door 10" hd       10x            Ther Activity                           Gait Training                           Modalities

## 2023-07-13 ENCOUNTER — APPOINTMENT (OUTPATIENT)
Dept: PHYSICAL THERAPY | Facility: CLINIC | Age: 63
End: 2023-07-13
Payer: COMMERCIAL

## 2023-07-18 ENCOUNTER — OFFICE VISIT (OUTPATIENT)
Dept: PHYSICAL THERAPY | Facility: CLINIC | Age: 63
End: 2023-07-18
Payer: COMMERCIAL

## 2023-07-18 DIAGNOSIS — S32.001A CLOSED BURST FRACTURE OF LUMBAR VERTEBRA, INITIAL ENCOUNTER (HCC): Primary | ICD-10-CM

## 2023-07-18 PROCEDURE — 97110 THERAPEUTIC EXERCISES: CPT | Performed by: PHYSICAL THERAPIST

## 2023-07-18 PROCEDURE — 97112 NEUROMUSCULAR REEDUCATION: CPT | Performed by: PHYSICAL THERAPIST

## 2023-07-18 PROCEDURE — 97140 MANUAL THERAPY 1/> REGIONS: CPT | Performed by: PHYSICAL THERAPIST

## 2023-07-18 NOTE — PROGRESS NOTES
Daily Note     Today's date: 2023  Patient name: Kiley Lopez. : 1960  MRN: [id number]  Referring provider: Maggi Pascal,*  Dx:   Encounter Diagnosis     ICD-10-CM    1. Closed burst fracture of lumbar vertebra, initial encounter ([address] )  S32.001A                      Subjective: Pt reports he has returned to work and will need to stop therapy at the end of this week      Objective: See treatment diary below      Assessment: Tolerated treatment well. Patient would benefit from continued PT      Plan: Progress treatment as tolerated.        Precautions: Hx of DVT, Surgery 3/16/2023 T11-L3 Fusion      Manuals    MFR to thoracic and lumbar 15'  15' 15' 15' 10'                              Neuro Re-Ed         SHAN 10x  10x 10x 10x nt   PPU 30x  30x 30x 30x nt   SAG's         TA Set 10" hold 10x  10x 10x 10x 10     TA Set with SLR 30x  30x 30x 2x15 2x15   TA Set with Marching 20x   20x 20x 20x   TA Set with Squat         SKTC 30" hd  3x     ADD 3x 3x   DKTC         Multifidus Set 30x    20x 20x 30x nt            Ther Ex         Rec Bike 6 min L3  6min L2 6 min L2 6 min L2 6 min L3   Hamstring Stretch 30" hold 3x  3x 3x 3x 3x   Hip 4-way         Star Lunges 5x        UTR on physio ball 15x YMB  10x 10x 15x 15x yellow medicine ball   LTR 30" hd  3x     3x   Open door 10" hd  10x     10x            Ther Activity                           Gait Training                           Modalities

## 2023-07-20 ENCOUNTER — EVALUATION (OUTPATIENT)
Dept: PHYSICAL THERAPY | Facility: CLINIC | Age: 63
End: 2023-07-20
Payer: COMMERCIAL

## 2023-07-20 DIAGNOSIS — S32.001A CLOSED BURST FRACTURE OF LUMBAR VERTEBRA, INITIAL ENCOUNTER (HCC): Primary | ICD-10-CM

## 2023-07-20 PROCEDURE — 97110 THERAPEUTIC EXERCISES: CPT | Performed by: PHYSICAL THERAPIST

## 2023-07-20 PROCEDURE — 97140 MANUAL THERAPY 1/> REGIONS: CPT | Performed by: PHYSICAL THERAPIST

## 2023-07-20 NOTE — PROGRESS NOTES
PT Re-Evaluation     Today's date: 2023  Patient name: Danie Mendez : 1960  MRN: [id number]  Referring provider: Izzy Elias,*  Dx:   Encounter Diagnosis     ICD-10-CM    1. Closed burst fracture of lumbar vertebra, initial encounter [facility])  S32.001A                      Assessment  Assessment details: Danie Mendez is a 61 y.o. male who presents with pain, decreased strength and decreased ROM. Due to these impairments, patient has difficulty performing ADL's, recreational activities, work-related activities, engaging in social activities. Patient demonstrates improving strength, ROM, and flexibility. Patient's clinical presentation is consistent with their referring diagnosis of Closed burst fracture of lumbar vertebra, initial encounter [facility])  Plan: Ambulatory referral to Physical Therapy  . Patient has been educated in home exercise program and plan of care. Patient would benefit from skilled physical therapy services to address their aforementioned functional limitations and progress towards prior level of function and independence with home exercise program.     Impairments: abnormal muscle firing, abnormal or restricted ROM, activity intolerance, impaired physical strength, lacks appropriate home exercise program and pain with function  Understanding of Dx/Px/POC: good   Prognosis: good    Goals  Short Term Goals:    1. Initiate and advance HEP - MET  2. AROM Lumbar Flexion 75 degrees - Partially MET  3. MMT Core strength 4/5 - not MET    Long Term Goals:    1. Indep with HEP  2. [place] - Not MET  3.  Roll Over in bed - Not MET    Plan  Patient would benefit from: skilled PT  Planned modality interventions: cryotherapy, electrical stimulation/Russian stimulation and thermotherapy: hydrocollator packs  Planned therapy interventions: joint mobilization, manual therapy, patient education, postural training, activity modification, abdominal trunk stabilization, body mechanics training, flexibility, functional ROM exercises, graded exercise, home exercise program, neuromuscular re-education, strengthening, stretching, therapeutic activities, therapeutic exercise, motor coordination training, muscle pump exercises, gait training, balance/weight bearing training and ADL training  Frequency: 2x week  Duration in weeks: 6  Treatment plan discussed with: patient        Subjective Evaluation    History of Present Illness  Mechanism of injury: Pt reports on 2023 falling through the floor of his apartment building. Landing in the plastic shower of the apartment below bursting his L5. Pt with lumbar fusion the following day. Quality of life: good    Patient Goals  Patient goals for therapy: decreased pain, increased motion, increased strength, independence with ADLs/IADLs, return to sport/leisure activities and return to work    Pain  Current pain ratin  At best pain ratin  At worst pain ratin  Quality: dull ache and throbbing  Relieving factors: medications and rest  Exacerbated by: turn head left. Progression: improved      Diagnostic Tests  X-ray: abnormal  Treatments  Previous treatment: medication  Current treatment: immobilization, medication and physical therapy        Objective     Postural Observations  Seated posture: . Active Range of Motion     Lumbar   Flexion: 69 degrees   Extension: 18 degrees   Left lateral flexion: 26 degrees       Right lateral flexion: 26 degrees   Left Hip   Flexion: 100 degrees   Extension: -7 degrees   External rotation (90/90): [facility]  Internal rotation (90/90): WFL    Right Hip   Flexion: 103 degrees   Extension: -7 degrees   External rotation (90/90): [facility]  Internal rotation (90/90):  [facility]    Strength/Myotome Testing     Left Hip   Planes of Motion   Flexion: 4  Extension: 2-  Abduction: 2+  External rotation: 4  Internal rotation: 4    Right Hip   Planes of Motion   Flexion: 4+  Extension: 2-  Abduction: 2+  External rotation: 4  Internal rotation: 4    Additional Strength Details  Core:  <3/5    Tests     Lumbar     Left   Negative slump test.     Right   Negative slump test.     Left Hip   Negative Ely's and long sit. Right Hip   Negative Ely's and long sit.      Additional Tests Details  Repeated Motion Test:  Flexion: no change  Extension: no change  Hamstring Flexibility:  R:-37  L: -40                   Precautions: Hx of DVT, Surgery 3/16/2023 T11-L3 Fusion        Manuals 7/18 7/20 6/27 6/29 7/11   MFR to thoracic and lumbar 15'    15' 15' 10'                                                Neuro Re-Ed              SHAN 10x    10x 10x nt   PPU 30x    30x 30x nt   SAG's              TA Set 10" hold 10x    10x 10x 10      TA Set with SLR 30x    30x 2x15 2x15   TA Set with Marching 20x    20x 20x 20x   TA Set with Squat              SKTC 30" hd  3x      ADD 3x 3x   DKTC              Multifidus Set 30x    20x 30x nt                  Ther Ex              Rec Bike 6 min L3  6 min L3  6 min L2 6 min L2 6 min L3   Hamstring Stretch 30" hold 3x    3x 3x 3x   Hip 4-way              Star Lunges 5x            UTR on physio ball 15x YMB    10x 15x 15x yellow medicine ball   LTR 30" hd  3x        3x   Open door 10" hd  10x        10x                  Ther Activity                                            Gait Training                                            Modalities

## 2023-07-27 ENCOUNTER — APPOINTMENT (OUTPATIENT)
Dept: PHYSICAL THERAPY | Facility: CLINIC | Age: 63
End: 2023-07-27
Payer: COMMERCIAL

## 2023-07-28 ENCOUNTER — OFFICE VISIT (OUTPATIENT)
Dept: FAMILY MEDICINE CLINIC | Facility: CLINIC | Age: 63
End: 2023-07-28
Payer: COMMERCIAL

## 2023-07-28 VITALS
TEMPERATURE: 97.6 F | WEIGHT: 184.9 LBS | BODY MASS INDEX: 27.39 KG/M2 | OXYGEN SATURATION: 98 % | HEIGHT: 69 IN | SYSTOLIC BLOOD PRESSURE: 130 MMHG | HEART RATE: 79 BPM | DIASTOLIC BLOOD PRESSURE: 84 MMHG | RESPIRATION RATE: 18 BRPM

## 2023-07-28 DIAGNOSIS — E78.5 HYPERLIPIDEMIA, UNSPECIFIED HYPERLIPIDEMIA TYPE: ICD-10-CM

## 2023-07-28 DIAGNOSIS — I10 ESSENTIAL HYPERTENSION: Primary | ICD-10-CM

## 2023-07-28 DIAGNOSIS — E55.9 VITAMIN D DEFICIENCY: ICD-10-CM

## 2023-07-28 PROBLEM — R33.9 URINARY RETENTION: Status: RESOLVED | Noted: 2023-03-16 | Resolved: 2023-07-28

## 2023-07-28 PROBLEM — R09.02 HYPOXIA: Status: RESOLVED | Noted: 2023-03-19 | Resolved: 2023-07-28

## 2023-07-28 PROCEDURE — 99213 OFFICE O/P EST LOW 20 MIN: CPT | Performed by: NURSE PRACTITIONER

## 2023-07-28 RX ORDER — NAPROXEN 250 MG/1
TABLET ORAL
COMMUNITY

## 2023-07-28 RX ORDER — CHLORTHALIDONE 25 MG/1
25 TABLET ORAL DAILY
Qty: 30 TABLET | Refills: 1 | Status: SHIPPED | OUTPATIENT
Start: 2023-07-28

## 2023-07-28 RX ORDER — MELATONIN
2000 DAILY
Qty: 180 TABLET | Refills: 1 | Status: SHIPPED | OUTPATIENT
Start: 2023-07-28

## 2023-07-28 RX ORDER — ROSUVASTATIN CALCIUM 10 MG/1
10 TABLET, COATED ORAL DAILY
Qty: 90 TABLET | Refills: 0 | Status: SHIPPED | OUTPATIENT
Start: 2023-07-28

## 2023-07-28 NOTE — PATIENT INSTRUCTIONS
Hypertension   AMBULATORY CARE:   Hypertension  is high blood pressure. Your blood pressure is the force of your blood moving against the walls of your arteries. Hypertension causes your blood pressure to get so high that your heart has to work much harder than normal. This can damage your heart. Hypertension that does not respond to medicines and lifestyle changes is called resistant hypertension. Hypertension is considered chronic when it continues for 3 months or longer. Signs and symptoms of hypertension:  You may have no signs or symptoms, or you may have any of the following:  Headache    Blurred vision    Chest pain    Dizziness or weakness    Trouble breathing    Nosebleeds    Call your local emergency number (911 in the [address]) or have someone call if:   You have chest pain. You have any of the following signs of a heart attack:      Squeezing, pressure, or pain in your chest    You may  also have any of the following:     Discomfort or pain in your back, neck, jaw, stomach, or arm    Shortness of breath    Nausea or vomiting    Lightheadedness or a sudden cold sweat    You become confused or have trouble speaking. You suddenly feel lightheaded or have trouble breathing. Seek care immediately if:   You have a severe headache or vision loss. You have weakness in an arm or leg. Call your doctor or cardiologist if:   You feel faint, dizzy, confused, or drowsy. You have been taking your blood pressure medicine but your pressure is higher than your provider says it should be. You have questions or concerns about your condition or care. What you need to know about the stages of hypertension:  Your healthcare provider will give you a blood pressure goal based on your age, health, and risk for cardiovascular disease. The following are general guidelines on the stages of hypertension:  Normal blood pressure is 119/79 or lower .  Your healthcare provider may only check your blood pressure each year if it stays at a normal level. Elevated blood pressure is 120/79 to 129/79 . This is sometimes called prehypertension. Your healthcare provider may suggest lifestyle changes to help lower your blood pressure to a normal level. He or she may then check it again in 3 to 6 months. Stage 1 hypertension is 130/80  to 139/89 . Your provider may recommend lifestyle changes, medication, and checks every 3 to 6 months until your blood pressure is controlled. Stage 2 hypertension is 140/90 or higher . Your provider will recommend lifestyle changes and have you take 2 kinds of hypertension medicines. You will also need to have your blood pressure checked monthly until it is controlled. Treatment  may include any of the following:  Antihypertensives  may be used to help lower your blood pressure. Several kinds of medicines are available. Your healthcare provider will choose medicines based on the kind of hypertension you have. You may need more than one type of medicine. Take the medicine exactly as directed. Diuretics  help decrease extra fluid that collects in your body. This will help lower your blood pressure. You may urinate more often while you take this medicine. Cholesterol medicine  helps lower your cholesterol level. A low cholesterol level helps prevent heart disease and makes it easier to control your blood pressure. Manage hypertension:   Check your blood pressure at home. Do not smoke, have caffeine, or exercise for at least 30 minutes before you check your blood pressure. Sit and rest for 5 minutes before you check your blood pressure. Extend your arm and support it on a flat surface. Your arm should be at the same level as your heart. Follow the directions that came with your blood pressure monitor. Check your blood pressure 2 times, 1 minute apart, before you take your medicine in the morning.  Also check your blood pressure before your evening meal. Keep a record of your readings and bring it to your follow-up visits. Ask your healthcare provider what your blood pressure should be. Manage any other health conditions you have. Health conditions such as diabetes can increase your risk for hypertension. Follow your healthcare provider's instructions and take all your medicines as directed. Ask about all medicines. Certain medicines can increase your blood pressure. Examples include oral birth control pills, decongestants, herbal supplements, and NSAIDs, such as ibuprofen. Your healthcare provider can tell you which medicines are safe for you to take. This includes prescription and over-the-counter medicines. Lifestyle changes you can make to manage hypertension:  Your healthcare provider may recommend you work with a team to manage hypertension. The team may include medical experts such as a dietitian, an exercise or physical therapist, and a behavior therapist. Your family members may be included in helping you create lifestyle changes. Limit sodium (salt) as directed. Too much sodium can affect your fluid balance. Check labels to find low-sodium or no-salt-added foods. Some low-sodium foods use potassium salts for flavor. Too much potassium can also cause health problems. Your healthcare provider will tell you how much sodium and potassium are safe for you to have in a day. He or she may recommend that you limit sodium to 2,300 mg a day. Follow the meal plan recommended by your healthcare provider. A dietitian or your provider can give you more information on low-sodium plans or the DASH (Dietary Approaches to Stop Hypertension) eating plan. The DASH plan is low in sodium, processed sugar, unhealthy fats, and total fat. It is high in potassium, calcium, and fiber. These can be found in vegetables, fruit, and whole-grain foods. Be physically active throughout the day.   Physical activity, such as exercise, can help control your blood pressure and your weight. Be physically active for at least 30 minutes per day, on most days of the week. Include aerobic activity, such as walking or riding a bicycle. Also include strength training at least 2 times each week. Your healthcare providers can help you create a physical activity plan. Decrease stress. This may help lower your blood pressure. Learn ways to relax, such as deep breathing or listening to music. Limit alcohol as directed. Alcohol can increase your blood pressure. A drink of alcohol is 12 ounces of beer, 5 ounces of wine, or 1½ ounces of liquor. Do not smoke. Nicotine and other chemicals in cigarettes and cigars can increase your blood pressure and also cause lung damage. Ask your healthcare provider for information if you currently smoke and need help to quit. E-cigarettes or smokeless tobacco still contain nicotine. Talk to your healthcare provider before you use these products. Follow up with your doctor or cardiologist as directed: You will need to return to have your blood pressure checked and to have other lab tests done. Write down your questions so you remember to ask them during your visits. © Copyright [facility] 2022 Information is for End User's use only and may not be sold, redistributed or otherwise used for commercial purposes. The above information is an  only. It is not intended as medical advice for individual conditions or treatments. Talk to your doctor, nurse or pharmacist before following any medical regimen to see if it is safe and effective for you.

## 2023-07-28 NOTE — PROGRESS NOTES
Assessment/Plan:     Diagnoses and all orders for this visit:    Essential hypertension  Comments:  Start chlorthalidone 25 mg daily  Check BMP in 2 weeks  RTC 1 month for HTN and lab review  Orders:  -     chlorthalidone 25 mg tablet; Take 1 tablet (25 mg total) by mouth daily  -     Basic metabolic panel; Future    Hyperlipidemia, unspecified hyperlipidemia type  -     rosuvastatin (CRESTOR) 10 MG tablet; Take 1 tablet (10 mg total) by mouth daily  -     Lipid Panel with Direct LDL reflex; Future    Vitamin D deficiency  -     cholecalciferol (VITAMIN D3) 1,000 units tablet; Take 2 tablets (2,000 Units total) by mouth daily  -     Vitamin D 25 hydroxy; Future    Other orders  -     naproxen (NAPROSYN) 250 mg tablet; Take 3 tablets twice a day by oral route as needed. Return in about 4 weeks (around 8/25/2023) for Recheck HTN and lab review. Subjective:        Patient ID: Wm Martino. is a 61 y.o. male. Chief Complaint   Patient presents with   • Follow-up     HTN - Back surgery        PMH mixed HLD, vitamin D deficiency, fall with lumbar burst fracture, and urinary retention presents for blood pressure evaluation. 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 Patient was diagnosed with L1 burst fracture and had difficulty with urinary retention. Patient is status post T11 L3 posterior percutaneous pedicle fixation fusion. He has been able to complete P.T. and return to work x 2 weeks. He endorses taking breaks to manage pain. Hypertension  This is a chronic problem. The current episode started more than 1 year ago. Pertinent negatives include no anxiety, blurred vision, chest pain, headaches, malaise/fatigue, neck pain, orthopnea, palpitations, peripheral edema, PND, shortness of breath or sweats. Agents associated with hypertension include NSAIDs. Risk factors for coronary artery disease include male gender, dyslipidemia, sedentary lifestyle and stress. Past treatments include lifestyle changes. Compliance problems include diet. The following portions of the patient's history were reviewed and updated as appropriate: allergies, current medications, past family history, past medical history, past social history, past surgical history and problem list.    Patient Active Problem List   Diagnosis   • Shoulder joint pain   • Strain of supraspinatus muscle or tendon   • Vitamin D deficiency   • Closed unstable burst fracture of first lumbar vertebra (HCC)   • Acute midline low back pain without sciatica   • DVT prophylaxis   • Thrombocytopenia (HCC)       Current Outpatient Medications   Medication Sig Dispense Refill   • acetaminophen (TYLENOL) 325 mg tablet Take 3 tablets (975 mg total) by mouth every 8 (eight) hours  0   • chlorthalidone 25 mg tablet Take 1 tablet (25 mg total) by mouth daily 30 tablet 1   • cholecalciferol (VITAMIN D3) 1,000 units tablet Take 2 tablets (2,000 Units total) by mouth daily 180 tablet 1   • gabapentin (NEURONTIN) 300 mg capsule Take 1 capsule (300 mg total) by mouth 3 (three) times a day 90 capsule 0   • naproxen (NAPROSYN) 250 mg tablet Take 3 tablets twice a day by oral route as needed. • rosuvastatin (CRESTOR) 10 MG tablet Take 1 tablet (10 mg total) by mouth daily 90 tablet 0     No current facility-administered medications for this visit. Past Medical History:   Diagnosis Date   • Known health problems: none         Past Surgical History:   Procedure Laterality Date   • LUMBAR FUSION N/A 3/16/2023    Procedure: FUSION LUMBAR/THORACIC POSTERIOR PERCUTANEOUS SCREW FIXATION T11 -L3(NAVIGATED);   Surgeon: Jessica Moore MD;  Location: [facility];  Service: Neurosurgery   • ROTATOR CUFF REPAIR Right    • SHOULDER SURGERY          Social History     Socioeconomic History   • Marital status: /Civil Union     Spouse name: Not on file   • Number of children: Not on file   • Years of education: Not on file   • Highest education level: Not on file   Occupational History   • Not on file   Tobacco Use   • Smoking status: Never   • Smokeless tobacco: Never   Vaping Use   • Vaping Use: Not on file   Substance and Sexual Activity   • Alcohol use: Yes     Comment: social   • Drug use: Never   • Sexual activity: Never     Partners: Female, Male   Other Topics Concern   • Not on file   Social History Narrative   • Not on file     Social Determinants of Health     Financial Resource Strain: Not on file   Food Insecurity: No Food Insecurity (3/16/2023)    Hunger Vital Sign    • Worried About Running Out of Food in the Last Year: Never true    • Ran Out of Food in the Last Year: Never true   Transportation Needs: No Transportation Needs (3/16/2023)    PRAPARE - Transportation    • Lack of Transportation (Medical): No    • Lack of Transportation (Non-Medical): No   Physical Activity: Not on file   Stress: Not on file   Social Connections: Not on file   Intimate Partner Violence: Not on file   Housing Stability: Low Risk  (3/16/2023)    Housing Stability Vital Sign    • Unable to Pay for Housing in the Last Year: No    • Number of Places Lived in the Last Year: 1    • Unstable Housing in the Last Year: No      .  Review of Systems   Constitutional: Negative for activity change, appetite change, fatigue and malaise/fatigue. Eyes: Negative for blurred vision. Respiratory: Negative for cough and shortness of breath. Cardiovascular: Negative for chest pain, palpitations, orthopnea and PND. Gastrointestinal: Negative for abdominal pain, blood in stool, constipation, diarrhea and nausea. Genitourinary: Negative for difficulty urinating, dysuria and hematuria. Musculoskeletal: Positive for back pain. Negative for neck pain. Skin: Negative for rash. Neurological: Negative for dizziness, weakness and headaches. Psychiatric/Behavioral: Negative for sleep disturbance. The patient is not nervous/anxious. Objective:      /84 (BP Location: Left arm, Patient Position: Sitting, Cuff Size: Standard)   Pulse 79   Temp 97.6 °F (36.4 °C)   Resp 18   Ht 5' 9" (1.753 m)   Wt 83.9 kg (184 lb 14.4 oz)   SpO2 98%   BMI 27.30 kg/m²          Physical Exam  Vitals and nursing note reviewed. HENT:      Nose: Nose normal.      Mouth/Throat:      Mouth: Mucous membranes are moist.   Eyes:      Conjunctiva/sclera: Conjunctivae normal.   Cardiovascular:      Rate and Rhythm: Normal rate and regular rhythm. Pulmonary:      Effort: Pulmonary effort is normal.      Breath sounds: Normal breath sounds. Abdominal:      General: Abdomen is flat. Bowel sounds are normal.      Palpations: Abdomen is soft. Genitourinary:     Comments: Exam deferred  Skin:     General: Skin is warm and dry. Capillary Refill: Capillary refill takes less than 2 seconds. Neurological:      General: No focal deficit present. Mental Status: He is alert and oriented to person, place, and time. Deep Tendon Reflexes:      Reflex Scores:       Patellar reflexes are 2+ on the right side and 2+ on the left side.   Psychiatric:         Mood and Affect: Mood normal.         Behavior: Behavior normal.

## 2023-08-01 ENCOUNTER — APPOINTMENT (OUTPATIENT)
Dept: PHYSICAL THERAPY | Facility: CLINIC | Age: 63
End: 2023-08-01
Payer: COMMERCIAL

## 2023-08-03 ENCOUNTER — APPOINTMENT (OUTPATIENT)
Dept: LAB | Facility: MEDICAL CENTER | Age: 63
End: 2023-08-03
Payer: COMMERCIAL

## 2023-08-03 ENCOUNTER — APPOINTMENT (OUTPATIENT)
Dept: PHYSICAL THERAPY | Facility: CLINIC | Age: 63
End: 2023-08-03
Payer: COMMERCIAL

## 2023-08-03 DIAGNOSIS — E78.5 HYPERLIPIDEMIA, UNSPECIFIED HYPERLIPIDEMIA TYPE: ICD-10-CM

## 2023-08-03 DIAGNOSIS — I10 ESSENTIAL HYPERTENSION: ICD-10-CM

## 2023-08-03 DIAGNOSIS — E55.9 VITAMIN D DEFICIENCY: ICD-10-CM

## 2023-08-03 LAB
25(OH)D3 SERPL-MCNC: 38.1 NG/ML (ref 30–100)
ANION GAP SERPL CALCULATED.3IONS-SCNC: 4 MMOL/L
BUN SERPL-MCNC: 22 MG/DL (ref 5–25)
CALCIUM SERPL-MCNC: 9.4 MG/DL (ref 8.3–10.1)
CHLORIDE SERPL-SCNC: 106 MMOL/L (ref 96–108)
CHOLEST SERPL-MCNC: 182 MG/DL
CO2 SERPL-SCNC: 29 MMOL/L (ref 21–32)
CREAT SERPL-MCNC: 0.91 MG/DL (ref 0.6–1.3)
GFR SERPL CREATININE-BSD FRML MDRD: 89 ML/MIN/1.73SQ M
GLUCOSE P FAST SERPL-MCNC: 97 MG/DL (ref 65–99)
HDLC SERPL-MCNC: 53 MG/DL
LDLC SERPL CALC-MCNC: 100 MG/DL (ref 0–100)
POTASSIUM SERPL-SCNC: 3.7 MMOL/L (ref 3.5–5.3)
SODIUM SERPL-SCNC: 139 MMOL/L (ref 135–147)
TRIGL SERPL-MCNC: 147 MG/DL

## 2023-08-03 PROCEDURE — 80061 LIPID PANEL: CPT

## 2023-08-03 PROCEDURE — 82306 VITAMIN D 25 HYDROXY: CPT

## 2023-08-03 PROCEDURE — 80048 BASIC METABOLIC PNL TOTAL CA: CPT

## 2023-08-03 PROCEDURE — 36415 COLL VENOUS BLD VENIPUNCTURE: CPT

## 2023-08-04 ENCOUNTER — TELEPHONE (OUTPATIENT)
Dept: FAMILY MEDICINE CLINIC | Facility: CLINIC | Age: 63
End: 2023-08-04

## 2023-08-04 NOTE — TELEPHONE ENCOUNTER
----- Message from [address] sent at 8/4/2023  8:13 AM EDT -----  Labs look good; no changes at this time. Please ensure f/u appt is scheduled for later this month for HTN. Thanks!

## 2023-08-08 ENCOUNTER — OFFICE VISIT (OUTPATIENT)
Dept: PHYSICAL THERAPY | Facility: CLINIC | Age: 63
End: 2023-08-08
Payer: COMMERCIAL

## 2023-08-08 DIAGNOSIS — S32.001A CLOSED BURST FRACTURE OF LUMBAR VERTEBRA, INITIAL ENCOUNTER (HCC): Primary | ICD-10-CM

## 2023-08-08 PROCEDURE — 97112 NEUROMUSCULAR REEDUCATION: CPT | Performed by: PHYSICAL THERAPIST

## 2023-08-08 PROCEDURE — 97110 THERAPEUTIC EXERCISES: CPT | Performed by: PHYSICAL THERAPIST

## 2023-08-08 PROCEDURE — 97140 MANUAL THERAPY 1/> REGIONS: CPT | Performed by: PHYSICAL THERAPIST

## 2023-08-08 NOTE — PROGRESS NOTES
Daily Note     Today's date: 2023  Patient name: Shemar Ceja. : 1960  MRN: [id number]  Referring provider: Alexis Bernstein,*  Dx:   Encounter Diagnosis     ICD-10-CM    1. Closed burst fracture of lumbar vertebra, initial encounter ([address])  S32.001A                      Subjective: Pt reports he was able to ride his bicycle a short distance without increased Sx's      Objective: See treatment diary below      Assessment: Tolerated treatment well. Patient requires VC's to recall exercises      Plan: Progress treatment as tolerated.        Precautions: Hx of DVT, Surgery 3/16/2023 T11-L3 Fusion        Manuals    MFR to thoracic and lumbar 15'   10  15' 10'                                             Neuro Re-Ed             SHAN 10x     10x nt   PPU 30x     30x nt   SAG's             TA Set 10" hold 10x   10x  10x 10      TA Set with SLR 30x   30x  2x15 2x15   TA Set with Marching 20x   25x  20x 20x   TA Set with Squat             SKTC 30" hd  3x   3x  3x 3x   DKTC             Multifidus Set 30x     30x nt                 Ther Ex             Rec Bike 6 min L3  6 min L3 6 min L3  6 min L2 6 min L3   Hamstring Stretch 30" hold 3x   3x  3x 3x   Hip 4-way             Star Lunges 5x   5x        UTR on physio ball 15x YMB   15x YMB  15x 15x yellow medicine ball   LTR 30" hd  3x   3x    3x   Open door 10" hd  10x   10x    10x                 Ther Activity                                         Gait Training                                            Modalities

## 2023-08-10 ENCOUNTER — OFFICE VISIT (OUTPATIENT)
Dept: PHYSICAL THERAPY | Facility: CLINIC | Age: 63
End: 2023-08-10
Payer: COMMERCIAL

## 2023-08-10 DIAGNOSIS — S32.001A CLOSED BURST FRACTURE OF LUMBAR VERTEBRA, INITIAL ENCOUNTER (HCC): Primary | ICD-10-CM

## 2023-08-10 PROCEDURE — 97110 THERAPEUTIC EXERCISES: CPT | Performed by: PHYSICAL THERAPIST

## 2023-08-10 PROCEDURE — 97140 MANUAL THERAPY 1/> REGIONS: CPT | Performed by: PHYSICAL THERAPIST

## 2023-08-10 NOTE — PROGRESS NOTES
Daily Note     Today's date: 8/10/2023  Patient name: Lisseth Muñoz. : 1960  MRN: [id number]  Referring provider: Zenobia Tanner,*  Dx:   Encounter Diagnosis     ICD-10-CM    1. Closed burst fracture of lumbar vertebra, initial encounter ([address])  S32.001A                      Subjective: Pt reports Left side LBP with rolling over in bed      Objective: See treatment diary below      Assessment: Tolerated treatment well. Patient exhibited good technique with therapeutic exercises      Plan: Progress treatment as tolerated.        Precautions: Hx of DVT, Surgery 3/16/2023 T11-L3 Fusion        Manuals 7/18 7/20 8/8 8/10  7/11   MFR to thoracic and lumbar 15'   10 10'  10'                                          Neuro Re-Ed            SHAN 10x      nt   PPU 30x      nt   SAG's            TA Set 10" hold 10x   10x   10      TA Set with SLR 30x   30x   2x15   TA Set with Marching 20x   25x   20x   TA Set with Squat            SKTC 30" hd  3x   3x   3x   DKTC            Multifidus Set 30x      nt                Ther Ex            Rec Bike 6 min L3  6 min L3 6 min L3 6 min L3  6 min L3   Hamstring Stretch 30" hold 3x   3x 3x  3x   Hip 4-way            Star Lunges 5x   5x 10x      UTR on physio ball 15x YMB   15x YMB 15x YMB  15x yellow medicine ball   LTR 30" hd  3x   3x 3x  3x   Open door 10" hd  10x   10x 10x  10x                Ther Activity                                      Gait Training                                         Modalities

## 2023-08-15 ENCOUNTER — APPOINTMENT (OUTPATIENT)
Dept: PHYSICAL THERAPY | Facility: CLINIC | Age: 63
End: 2023-08-15
Payer: COMMERCIAL

## 2023-08-17 ENCOUNTER — EVALUATION (OUTPATIENT)
Dept: PHYSICAL THERAPY | Facility: CLINIC | Age: 63
End: 2023-08-17
Payer: COMMERCIAL

## 2023-08-17 DIAGNOSIS — S32.001A CLOSED BURST FRACTURE OF LUMBAR VERTEBRA, INITIAL ENCOUNTER (HCC): Primary | ICD-10-CM

## 2023-08-17 PROCEDURE — 97110 THERAPEUTIC EXERCISES: CPT | Performed by: PHYSICAL THERAPIST

## 2023-08-17 PROCEDURE — 97140 MANUAL THERAPY 1/> REGIONS: CPT | Performed by: PHYSICAL THERAPIST

## 2023-08-17 PROCEDURE — 97535 SELF CARE MNGMENT TRAINING: CPT | Performed by: PHYSICAL THERAPIST

## 2023-08-17 NOTE — PROGRESS NOTES
PT Discharge    Today's date: 2023  Patient name: Carmen Tavares. : 1960  MRN: [id number]  Referring provider: Cyndie Hilton,*  Dx:   Encounter Diagnosis     ICD-10-CM    1. Closed burst fracture of lumbar vertebra, initial encounter [facility])  S32.001A                      Assessment  Assessment details: Carmen Tavares. is a 61 y.o. male who presents with pain, decreased strength and decreased ROM. He demonstrates improved objective measures and reports he is moving better. He is ready to be discharged to [address]. Impairments: abnormal muscle firing, abnormal or restricted ROM, activity intolerance, impaired physical strength, lacks appropriate home exercise program and pain with function  Understanding of Dx/Px/POC: good   Prognosis: good    Goals  Short Term Goals:    1. Initiate and advance HEP - MET  2. AROM Lumbar Flexion 75 degrees - MET  3. MMT Core strength 4/5 - not MET    Long Term Goals:    1. Indep with HEP  2. Reggiea Jennifer - Partially MET  3. Roll Over in bed - Not MET    Plan  Plan details: Discharge to an [facility]  Planned therapy interventions: home exercise program  Treatment plan discussed with: patient        Subjective Evaluation    History of Present Illness  Mechanism of injury: Pt reports on 2023 falling through the floor of his apartment building. Landing in the plastic shower of the apartment below bursting his L5. Pt with lumbar fusion the following day. Quality of life: good    Patient Goals  Patient goals for therapy: decreased pain, increased motion, increased strength, independence with ADLs/IADLs, return to sport/leisure activities and return to work    Pain  Current pain ratin  At best pain ratin  At worst pain ratin  Quality: dull ache and needle-like  Relieving factors: medications and rest  Exacerbated by: sleeping.   Progression: improved      Diagnostic Tests  X-ray: abnormal  Treatments  Previous treatment: medication  Current treatment: immobilization, medication and physical therapy        Objective     Postural Observations  Seated posture: . Active Range of Motion     Lumbar   Flexion: 77 degrees   Extension: 23 degrees   Left lateral flexion: 26 degrees       Right lateral flexion: 26 degrees   Left Hip   Flexion: 100 degrees   Extension: 2 degrees   External rotation (90/90): [facility]  Internal rotation (90/90): WFL    Right Hip   Flexion: 103 degrees   Extension: 2 degrees   External rotation (90/90): [facility]  Internal rotation (90/90): [facility]    Strength/Myotome Testing     Left Hip   Planes of Motion   Flexion: 4  Extension: 2  Abduction: 3  External rotation: 4  Internal rotation: 4    Right Hip   Planes of Motion   Flexion: 4+  Extension: 2  Abduction: 3  External rotation: 4  Internal rotation: 4    Additional Strength Details  Core:  5/5    Tests     Lumbar     Left   Negative slump test.     Right   Negative slump test.     Left Hip   Negative Ely's and long sit. Right Hip   Negative Ely's and long sit.      Additional Tests Details  Repeated Motion Test:  Flexion: no change  Extension: no change  Hamstring Flexibility:  R:-31  L: -36                   Precautions: Hx of DVT, Surgery 3/16/2023 T11-L3 Fusion        Manuals 7/18 7/20 8/8 8/10   7/11   MFR to thoracic and lumbar 15'   10 10'   10'                                                   Neuro Re-Ed               SHAN 10x         nt   PPU 30x         nt   SAG's               TA Set 10" hold 10x   10x     10      TA Set with SLR 30x   30x     2x15   TA Set with Marching 20x   25x     20x   TA Set with Squat               SKTC 30" hd  3x   3x     3x   DKTC               Multifidus Set 30x         nt                   Ther Ex               Rec Bike 6 min L3  6 min L3 6 min L3 6 min L3   6 min L3   Hamstring Stretch 30" hold 3x   3x 3x   3x   Hip 4-way               Star Lunges 5x   5x 10x       UTR on physio ball 15x YMB   15x YMB 15x YMB   15x yellow medicine ball   LTR 30" hd  3x   3x 3x   3x   Open door 10" hd  10x   10x 10x   10x                   Ther Activity                                               Gait Training                                               Modalities

## 2023-08-22 ENCOUNTER — APPOINTMENT (OUTPATIENT)
Dept: PHYSICAL THERAPY | Facility: CLINIC | Age: 63
End: 2023-08-22
Payer: COMMERCIAL

## 2023-08-24 ENCOUNTER — APPOINTMENT (OUTPATIENT)
Dept: PHYSICAL THERAPY | Facility: CLINIC | Age: 63
End: 2023-08-24
Payer: COMMERCIAL

## 2023-09-06 ENCOUNTER — RA CDI HCC (OUTPATIENT)
Dept: OTHER | Facility: HOSPITAL | Age: 63
End: 2023-09-06

## 2023-09-06 NOTE — PROGRESS NOTES
[address] [facility] coding opportunities       Chart reviewed, no opportunity found: CHART REVIEWED, NO OPPORTUNITY FOUND        Patients Insurance        Commercial Insurance: Dain Sommer

## 2023-09-13 ENCOUNTER — OFFICE VISIT (OUTPATIENT)
Dept: FAMILY MEDICINE CLINIC | Facility: CLINIC | Age: 63
End: 2023-09-13
Payer: COMMERCIAL

## 2023-09-13 VITALS
RESPIRATION RATE: 18 BRPM | DIASTOLIC BLOOD PRESSURE: 82 MMHG | WEIGHT: 187 LBS | TEMPERATURE: 98.2 F | OXYGEN SATURATION: 99 % | BODY MASS INDEX: 27.7 KG/M2 | HEIGHT: 69 IN | SYSTOLIC BLOOD PRESSURE: 132 MMHG | HEART RATE: 75 BPM

## 2023-09-13 DIAGNOSIS — I10 ESSENTIAL HYPERTENSION: ICD-10-CM

## 2023-09-13 PROCEDURE — 99213 OFFICE O/P EST LOW 20 MIN: CPT | Performed by: NURSE PRACTITIONER

## 2023-09-13 RX ORDER — CHLORTHALIDONE 25 MG/1
25 TABLET ORAL DAILY
Qty: 90 TABLET | Refills: 1 | Status: SHIPPED | OUTPATIENT
Start: 2023-09-13

## 2023-09-13 NOTE — PATIENT INSTRUCTIONS
Hypertension   AMBULATORY CARE:   Hypertension  is high blood pressure. Your blood pressure is the force of your blood moving against the walls of your arteries. Hypertension causes your blood pressure to get so high that your heart has to work much harder than normal. This can damage your heart. Hypertension that does not respond to medicines and lifestyle changes is called resistant hypertension. Hypertension is considered chronic when it continues for 3 months or longer. Signs and symptoms of hypertension:  You may have no signs or symptoms, or you may have any of the following:  Headache    Blurred vision    Chest pain    Dizziness or weakness    Trouble breathing    Nosebleeds    Call your local emergency number (911 in the [address]) or have someone call if:   You have chest pain. You have any of the following signs of a heart attack:      Squeezing, pressure, or pain in your chest    You may  also have any of the following:     Discomfort or pain in your back, neck, jaw, stomach, or arm    Shortness of breath    Nausea or vomiting    Lightheadedness or a sudden cold sweat    You become confused or have trouble speaking. You suddenly feel lightheaded or have trouble breathing. Seek care immediately if:   You have a severe headache or vision loss. You have weakness in an arm or leg. Call your doctor or cardiologist if:   You feel faint, dizzy, confused, or drowsy. You have been taking your blood pressure medicine but your pressure is higher than your provider says it should be. You have questions or concerns about your condition or care. What you need to know about the stages of hypertension:  Your healthcare provider will give you a blood pressure goal based on your age, health, and risk for cardiovascular disease. The following are general guidelines on the stages of hypertension:  Normal blood pressure is 119/79 or lower .  Your healthcare provider may only check your blood pressure each year if it stays at a normal level. Elevated blood pressure is 120/79 to 129/79 . This is sometimes called prehypertension. Your healthcare provider may suggest lifestyle changes to help lower your blood pressure to a normal level. He or she may then check it again in 3 to 6 months. Stage 1 hypertension is 130/80  to 139/89 . Your provider may recommend lifestyle changes, medication, and checks every 3 to 6 months until your blood pressure is controlled. Stage 2 hypertension is 140/90 or higher . Your provider will recommend lifestyle changes and have you take 2 kinds of hypertension medicines. You will also need to have your blood pressure checked monthly until it is controlled. Treatment  may include any of the following:  Antihypertensives  may be used to help lower your blood pressure. Several kinds of medicines are available. Your healthcare provider will choose medicines based on the kind of hypertension you have. You may need more than one type of medicine. Take the medicine exactly as directed. Diuretics  help decrease extra fluid that collects in your body. This will help lower your blood pressure. You may urinate more often while you take this medicine. Cholesterol medicine  helps lower your cholesterol level. A low cholesterol level helps prevent heart disease and makes it easier to control your blood pressure. Manage hypertension:   Check your blood pressure at home. Do not smoke, have caffeine, or exercise for at least 30 minutes before you check your blood pressure. Sit and rest for 5 minutes before you check your blood pressure. Extend your arm and support it on a flat surface. Your arm should be at the same level as your heart. Follow the directions that came with your blood pressure monitor. Check your blood pressure 2 times, 1 minute apart, before you take your medicine in the morning.  Also check your blood pressure before your evening meal. Keep a record of your readings and bring it to your follow-up visits. Ask your healthcare provider what your blood pressure should be. Manage any other health conditions you have. Health conditions such as diabetes can increase your risk for hypertension. Follow your healthcare provider's instructions and take all your medicines as directed. Ask about all medicines. Certain medicines can increase your blood pressure. Examples include oral birth control pills, decongestants, herbal supplements, and NSAIDs, such as ibuprofen. Your healthcare provider can tell you which medicines are safe for you to take. This includes prescription and over-the-counter medicines. Lifestyle changes you can make to manage hypertension:  Your healthcare provider may recommend you work with a team to manage hypertension. The team may include medical experts such as a dietitian, an exercise or physical therapist, and a behavior therapist. Your family members may be included in helping you create lifestyle changes. Limit sodium (salt) as directed. Too much sodium can affect your fluid balance. Check labels to find low-sodium or no-salt-added foods. Some low-sodium foods use potassium salts for flavor. Too much potassium can also cause health problems. Your healthcare provider will tell you how much sodium and potassium are safe for you to have in a day. He or she may recommend that you limit sodium to 2,300 mg a day. Follow the meal plan recommended by your healthcare provider. A dietitian or your provider can give you more information on low-sodium plans or the DASH (Dietary Approaches to Stop Hypertension) eating plan. The DASH plan is low in sodium, processed sugar, unhealthy fats, and total fat. It is high in potassium, calcium, and fiber. These can be found in vegetables, fruit, and whole-grain foods. Be physically active throughout the day.   Physical activity, such as exercise, can help control your blood pressure and your weight. Be physically active for at least 30 minutes per day, on most days of the week. Include aerobic activity, such as walking or riding a bicycle. Also include strength training at least 2 times each week. Your healthcare providers can help you create a physical activity plan. Decrease stress. This may help lower your blood pressure. Learn ways to relax, such as deep breathing or listening to music. Limit alcohol as directed. Alcohol can increase your blood pressure. A drink of alcohol is 12 ounces of beer, 5 ounces of wine, or 1½ ounces of liquor. Do not smoke. Nicotine and other chemicals in cigarettes and cigars can increase your blood pressure and also cause lung damage. Ask your healthcare provider for information if you currently smoke and need help to quit. E-cigarettes or smokeless tobacco still contain nicotine. Talk to your healthcare provider before you use these products. Follow up with your doctor or cardiologist as directed: You will need to return to have your blood pressure checked and to have other lab tests done. Write down your questions so you remember to ask them during your visits. © Copyright [employer]iv[employer] 2022 Information is for End User's use only and may not be sold, redistributed or otherwise used for commercial purposes. The above information is an  only. It is not intended as medical advice for individual conditions or treatments. Talk to your doctor, nurse or pharmacist before following any medical regimen to see if it is safe and effective for you.

## 2023-09-13 NOTE — PROGRESS NOTES
Assessment/Plan:     Diagnoses and all orders for this visit:    Essential hypertension  Comments:  Take chlorthalidone 25 mg daily  Monitor home BPs  Denies any alarm findings; reviewed same  RTC if BP greater than 135/85 consistently or alarm findings occur  Orders:  -     chlorthalidone 25 mg tablet; Take 1 tablet (25 mg total) by mouth daily          Return in about 23 weeks (around 2/21/2024) for Annual physical.     Subjective:        Patient ID: Kimberlee King. is a 61 y.o. male. Chief Complaint   Patient presents with   • Follow-up       PMH mixed HLD, vitamin D deficiency, fall with lumbar burst fracture, and urinary retention presents for follow up of HTN and lab review. Startec chlorthalidone with 2-week BMP evalution WNL. Has not been taking chlorthalidone consistently. Discussed importance of same to improve overall blood pressure control. Hypertension  This is a chronic problem. The current episode started more than 1 year ago. Pertinent negatives include no anxiety, blurred vision, chest pain, headaches, malaise/fatigue, neck pain, orthopnea, palpitations, peripheral edema, PND, shortness of breath or sweats. Risk factors for coronary artery disease include male gender, dyslipidemia, sedentary lifestyle and stress. Past treatments include diuretics and lifestyle changes. The current treatment provides mild improvement. Compliance problems include diet.         The following portions of the patient's history were reviewed and updated as appropriate: allergies, current medications, past family history, past medical history, past social history, past surgical history and problem list.    Patient Active Problem List   Diagnosis   • Shoulder joint pain   • Strain of supraspinatus muscle or tendon   • Vitamin D deficiency   • Closed unstable burst fracture of first lumbar vertebra (HCC)   • Acute midline low back pain without sciatica   • DVT prophylaxis   • Thrombocytopenia ([address])   • Essential hypertension       Current Outpatient Medications   Medication Sig Dispense Refill   • acetaminophen (TYLENOL) 325 mg tablet Take 3 tablets (975 mg total) by mouth every 8 (eight) hours  0   • chlorthalidone 25 mg tablet Take 1 tablet (25 mg total) by mouth daily 90 tablet 1   • cholecalciferol (VITAMIN D3) 1,000 units tablet Take 2 tablets (2,000 Units total) by mouth daily 180 tablet 1   • rosuvastatin (CRESTOR) 10 MG tablet Take 1 tablet (10 mg total) by mouth daily 90 tablet 0     No current facility-administered medications for this visit. Past Medical History:   Diagnosis Date   • Known health problems: none         Past Surgical History:   Procedure Laterality Date   • LUMBAR FUSION N/A 3/16/2023    Procedure: FUSION LUMBAR/THORACIC POSTERIOR PERCUTANEOUS SCREW FIXATION T11 -L3(NAVIGATED);   Surgeon: Dewayne Duke MD;  [facility];  Service: Neurosurgery   • ROTATOR CUFF REPAIR Right    • SHOULDER SURGERY          Social History     Socioeconomic History   • Marital status: /Civil Union     Spouse name: Not on file   • Number of children: Not on file   • Years of education: Not on file   • Highest education level: Not on file   Occupational History   • Not on file   Tobacco Use   • Smoking status: Never   • Smokeless tobacco: Never   Vaping Use   • Vaping Use: Not on file   Substance and Sexual Activity   • Alcohol use: Yes     Comment: social   • Drug use: Never   • Sexual activity: Never     Partners: Female, Male   Other Topics Concern   • Not on file   Social History Narrative   • Not on file     Social Determinants of Health     Financial Resource Strain: Not on file   Food Insecurity: No Food Insecurity (3/16/2023)    Hunger Vital Sign    • Worried About Running Out of Food in the Last Year: Never true    • Ran Out of Food in the Last Year: Never true   Transportation Needs: No Transportation Needs (3/16/2023)    PRAPARE - Transportation    • Lack of Transportation (Medical): No    • Lack of Transportation (Non-Medical): No   Physical Activity: Not on file   Stress: Not on file   Social Connections: Not on file   Intimate Partner Violence: Not on file   Housing Stability: Low Risk  (3/16/2023)    Housing Stability Vital Sign    • Unable to Pay for Housing in the Last Year: No    • Number of Places Lived in the Last Year: 1    • Unstable Housing in the Last Year: No      .  Review of Systems   Constitutional: Negative for activity change, appetite change, fatigue and malaise/fatigue. Eyes: Negative for blurred vision. Respiratory: Negative for cough and shortness of breath. Cardiovascular: Negative for chest pain, palpitations, orthopnea and PND. Gastrointestinal: Negative for abdominal pain, blood in stool, constipation, diarrhea and nausea. Genitourinary: Negative for difficulty urinating, dysuria and hematuria. Musculoskeletal: Positive for back pain (minor discomfort). Negative for neck pain. Skin: Negative for rash. Neurological: Negative for dizziness, weakness and headaches. Psychiatric/Behavioral: Negative for sleep disturbance. The patient is not nervous/anxious. Objective:      /82 (BP Location: Left arm, Patient Position: Sitting, Cuff Size: Standard)   Pulse 75   Temp 98.2 °F (36.8 °C)   Resp 18   Ht 5' 9" (1.753 m)   Wt 84.8 kg (187 lb)   SpO2 99%   BMI 27.62 kg/m²          Physical Exam  Vitals and nursing note reviewed. HENT:      Nose: Nose normal.      Mouth/Throat:      Mouth: Mucous membranes are moist.   Eyes:      Conjunctiva/sclera: Conjunctivae normal.   Cardiovascular:      Rate and Rhythm: Normal rate and regular rhythm. Pulmonary:      Effort: Pulmonary effort is normal.      Breath sounds: Normal breath sounds. Abdominal:      General: Abdomen is flat. Bowel sounds are normal.      Palpations: Abdomen is soft. Genitourinary:     Comments: Exam deferred  Skin:     General: Skin is warm and dry. Capillary Refill: Capillary refill takes less than 2 seconds. Neurological:      General: No focal deficit present. Mental Status: He is alert and oriented to person, place, and time. Deep Tendon Reflexes:      Reflex Scores:       Patellar reflexes are 2+ on the right side and 2+ on the left side.   Psychiatric:         Mood and Affect: Mood normal.         Behavior: Behavior normal.

## 2023-10-05 DIAGNOSIS — E55.9 VITAMIN D DEFICIENCY: ICD-10-CM

## 2023-10-05 DIAGNOSIS — I10 ESSENTIAL HYPERTENSION: ICD-10-CM

## 2023-10-05 DIAGNOSIS — E78.5 HYPERLIPIDEMIA, UNSPECIFIED HYPERLIPIDEMIA TYPE: ICD-10-CM

## 2023-10-05 RX ORDER — ROSUVASTATIN CALCIUM 10 MG/1
10 TABLET, COATED ORAL DAILY
Qty: 90 TABLET | Refills: 0 | Status: SHIPPED | OUTPATIENT
Start: 2023-10-05

## 2023-10-05 RX ORDER — CHLORTHALIDONE 25 MG/1
25 TABLET ORAL DAILY
Qty: 90 TABLET | Refills: 1 | Status: SHIPPED | OUTPATIENT
Start: 2023-10-05

## 2023-10-05 RX ORDER — ROSUVASTATIN CALCIUM 10 MG/1
10 TABLET, COATED ORAL DAILY
Qty: 90 TABLET | Refills: 0 | Status: SHIPPED | OUTPATIENT
Start: 2023-10-05 | End: 2023-10-05 | Stop reason: SDUPTHER

## 2023-10-05 RX ORDER — MELATONIN
2000 DAILY
Qty: 180 TABLET | Refills: 1 | Status: SHIPPED | OUTPATIENT
Start: 2023-10-05

## 2023-10-09 DIAGNOSIS — E78.5 HYPERLIPIDEMIA, UNSPECIFIED HYPERLIPIDEMIA TYPE: ICD-10-CM

## 2023-10-09 RX ORDER — ROSUVASTATIN CALCIUM 10 MG/1
10 TABLET, COATED ORAL DAILY
Qty: 90 TABLET | Refills: 0 | Status: SHIPPED | OUTPATIENT
Start: 2023-10-09

## 2023-10-09 NOTE — TELEPHONE ENCOUNTER
Good morning, this is [facility]. The pharmacy tech would like to get a refill for the Rosuvastatin tabs for 5 milligrams with current directions and a 90 day supply and it was written by Vijay Low and the patient is Helen Anand, [facility]. YOB: 1960. Please call back at [phone] and please use reference number [id number]. Thank you and have a nice day.

## 2023-12-16 DIAGNOSIS — E78.5 HYPERLIPIDEMIA, UNSPECIFIED HYPERLIPIDEMIA TYPE: ICD-10-CM

## 2023-12-16 RX ORDER — ROSUVASTATIN CALCIUM 10 MG/1
10 TABLET, COATED ORAL DAILY
Qty: 90 TABLET | Refills: 3 | Status: SHIPPED | OUTPATIENT
Start: 2023-12-16

## 2024-02-15 ENCOUNTER — RA CDI HCC (OUTPATIENT)
Dept: OTHER | Facility: HOSPITAL | Age: 64
End: 2024-02-15

## 2024-02-15 NOTE — PROGRESS NOTES
HCC coding opportunities       Chart reviewed, no opportunity found: CHART REVIEWED, NO OPPORTUNITY FOUND      This is a reminder to address (resolve/update/assess) ALL HCC (risk adjustment) codes as found on active problem list for 2024 as patient scores reset to zero OUMAR.  Also, just a reminder to please review and assess all other chronic conditions for 2024  Patients Insurance        Commercial Insurance: [employer] Commercial [employer]

## 2024-02-22 ENCOUNTER — OFFICE VISIT (OUTPATIENT)
Dept: FAMILY MEDICINE CLINIC | Facility: CLINIC | Age: 64
End: 2024-02-22
Payer: COMMERCIAL

## 2024-02-22 VITALS
DIASTOLIC BLOOD PRESSURE: 78 MMHG | OXYGEN SATURATION: 98 % | BODY MASS INDEX: 28.82 KG/M2 | TEMPERATURE: 98.5 F | WEIGHT: 194.6 LBS | HEIGHT: 69 IN | RESPIRATION RATE: 18 BRPM | HEART RATE: 89 BPM | SYSTOLIC BLOOD PRESSURE: 118 MMHG

## 2024-02-22 DIAGNOSIS — I10 ESSENTIAL HYPERTENSION: ICD-10-CM

## 2024-02-22 DIAGNOSIS — E55.9 VITAMIN D DEFICIENCY: ICD-10-CM

## 2024-02-22 DIAGNOSIS — Z23 NEED FOR VACCINATION: ICD-10-CM

## 2024-02-22 DIAGNOSIS — Z00.00 ANNUAL PHYSICAL EXAM: Primary | ICD-10-CM

## 2024-02-22 DIAGNOSIS — G95.20 UNSPECIFIED CORD COMPRESSION (HCC): ICD-10-CM

## 2024-02-22 DIAGNOSIS — Z13.1 DIABETES MELLITUS SCREENING: ICD-10-CM

## 2024-02-22 DIAGNOSIS — Z12.5 PROSTATE CANCER SCREENING: ICD-10-CM

## 2024-02-22 DIAGNOSIS — E78.5 HYPERLIPIDEMIA, UNSPECIFIED HYPERLIPIDEMIA TYPE: ICD-10-CM

## 2024-02-22 DIAGNOSIS — Z13.29 THYROID DISORDER SCREEN: ICD-10-CM

## 2024-02-22 PROCEDURE — 90686 IIV4 VACC NO PRSV 0.5 ML IM: CPT

## 2024-02-22 PROCEDURE — 99396 PREV VISIT EST AGE 40-64: CPT | Performed by: NURSE PRACTITIONER

## 2024-02-22 PROCEDURE — 90471 IMMUNIZATION ADMIN: CPT | Performed by: FAMILY MEDICINE

## 2024-02-22 NOTE — PROGRESS NOTES
ADULT ANNUAL PHYSICAL  [facility] [place]    NAME: Thomas M McArdle Jr.  AGE: 63 y.o. SEX: male  : 1960     DATE: 2024     Assessment and Plan:     Problem List Items Addressed This Visit       Vitamin D deficiency    Relevant Orders    Vitamin D 25 hydroxy    Essential hypertension    Relevant Orders    CBC and differential    Comprehensive metabolic panel    TSH, 3rd generation with Free T4 reflex    Unspecified cord compression (HCC)     Other Visit Diagnoses       Annual physical exam    -  Primary    Hyperlipidemia, unspecified hyperlipidemia type        Relevant Orders    Lipid Panel with Direct LDL reflex    Thyroid disorder screen        Relevant Orders    TSH, 3rd generation with Free T4 reflex    Prostate cancer screening        Relevant Orders    PSA, Total Screen    Diabetes mellitus screening        Relevant Orders    Hemoglobin A1C    Need for vaccination        Relevant Orders    influenza vaccine, quadrivalent, 0.5 mL, preservative-free, for adult and pediatric patients 6 mos+ (AFLURIA, FLUARIX, FLULAVAL, FLUZONE) (Completed)            Immunizations and preventive care screenings were discussed with patient today. Appropriate education was printed on patient's after visit summary.    Discussed risks and benefits of prostate cancer screening. We discussed the controversial history of PSA screening for prostate cancer in the [place] as well as the risk of over detection and over treatment of prostate cancer by way of PSA screening.  The patient understands that PSA blood testing is an imperfect way to screen for prostate cancer and that elevated PSA levels in the blood may also be caused by infection, inflammation, prostatic trauma or manipulation, urological procedures, or by benign prostatic enlargement.    The role of the digital rectal examination in prostate cancer screening was also discussed and I discussed with him  that there is large interobserver variability in the findings of digital rectal examination.    Counseling:  Alcohol/drug use: discussed moderation in alcohol intake, the recommendations for healthy alcohol use, and avoidance of illicit drug use.  Dental Health: discussed importance of regular tooth brushing, flossing, and dental visits.  Injury prevention: discussed safety/seat belts, safety helmets, smoke detectors, carbon dioxide detectors, and smoking near bedding or upholstery.  Sexual health: discussed sexually transmitted diseases, partner selection, use of condoms, avoidance of unintended pregnancy, and contraceptive alternatives.  Exercise: the importance of regular exercise/physical activity was discussed. Recommend exercise 3-5 times per week for at least 30 minutes.          Return in about 6 months (around 8/22/2024) for Recheck HTN.     Chief Complaint:     Chief Complaint   Patient presents with    Physical Exam     Annual physical       History of Present Illness:     Adult Annual Physical   Patient here for a comprehensive physical exam. The patient reports no problems.    Diet and Physical Activity  Diet/Nutrition: well balanced diet, limited junk food, and consuming 3-5 servings of fruits/vegetables daily.   Exercise: walking, 5-7 times a week on average, and 30-60 minutes on average.      Depression Screening  PHQ-2/9 Depression Screening    Little interest or pleasure in doing things: 0 - not at all  Feeling down, depressed, or hopeless: 0 - not at all       General Health  Sleep: gets 7-8 hours of sleep on average.   Hearing: normal - bilateral.  Vision: no vision problems, most recent eye exam <1 year ago, and wears glasses.   Dental: no dental visits for >1 year, brushes teeth once daily, and flosses teeth occasionally.        Health  Symptoms include: none    Advanced Care Planning  Do you have an advanced directive? yes  Do you have a durable medical power of ? yes  ACP document  given to patient? no     Review of Systems:     Review of Systems   Constitutional:  Negative for activity change, appetite change, fatigue and unexpected weight change.   HENT:  Negative for congestion, ear pain, hearing loss, nosebleeds, rhinorrhea, sinus pain and trouble swallowing.    Eyes:  Negative for photophobia.   Respiratory:  Negative for cough, shortness of breath and wheezing.    Cardiovascular:  Negative for chest pain, palpitations and leg swelling.   Gastrointestinal:  Negative for abdominal pain, blood in stool, constipation, diarrhea and nausea.   Endocrine: Negative for polydipsia, polyphagia and polyuria.   Genitourinary:  Negative for difficulty urinating, dysuria, frequency, hematuria and urgency.   Musculoskeletal:  Positive for back pain (mild after shoveling snow). Negative for arthralgias and myalgias.   Skin:  Negative for color change, rash and wound.   Neurological:  Negative for dizziness, tremors, syncope, weakness and headaches.   Psychiatric/Behavioral:  Negative for agitation, confusion, self-injury, sleep disturbance and suicidal ideas.       Past Medical History:     Past Medical History:   Diagnosis Date    Known health problems: none       Past Surgical History:     Past Surgical History:   Procedure Laterality Date    LUMBAR FUSION N/A 3/16/2023    Procedure: FUSION LUMBAR/THORACIC POSTERIOR PERCUTANEOUS SCREW FIXATION T11 -L3(NAVIGATED);  Surgeon: Craig Robert Goldberg, MD;  Location[facility];  Service: Neurosurgery    ROTATOR CUFF REPAIR Right     SHOULDER SURGERY        Family History:     Family History   Problem Relation Age of Onset    Breast cancer Mother     Heart disease Father     No Known Problems Sister     No Known Problems Brother       Social History:     Social History     Socioeconomic History    Marital status: /Civil Union     Spouse name: None    Number of children: None    Years of education: None    Highest education level: None   Occupational  "History    None   Tobacco Use    Smoking status: Never    Smokeless tobacco: Never   Vaping Use    Vaping status: Never Used   Substance and Sexual Activity    Alcohol use: Yes     Comment: social    Drug use: Never    Sexual activity: Never     Partners: Female, Male   Other Topics Concern    None   Social History Narrative    None     Social Determinants of Health     Financial Resource Strain: Not on file   Food Insecurity: No Food Insecurity (3/16/2023)    Hunger Vital Sign     Worried About Running Out of Food in the Last Year: Never true     Ran Out of Food in the Last Year: Never true   Transportation Needs: No Transportation Needs (3/16/2023)    PRAPARE - Transportation     Lack of Transportation (Medical): No     Lack of Transportation (Non-Medical): No   Physical Activity: Not on file   Stress: Not on file   Social Connections: Not on file   Intimate Partner Violence: Not on file   Housing Stability: Low Risk  (3/16/2023)    Housing Stability Vital Sign     Unable to Pay for Housing in the Last Year: No     Number of Places Lived in the Last Year: 1     Unstable Housing in the Last Year: No      Current Medications:     Current Outpatient Medications   Medication Sig Dispense Refill    chlorthalidone 25 mg tablet Take 1 tablet (25 mg total) by mouth daily 90 tablet 1    cholecalciferol (VITAMIN D3) 1,000 units tablet Take 2 tablets (2,000 Units total) by mouth daily 180 tablet 1    rosuvastatin (CRESTOR) 10 MG tablet TAKE 1 TABLET DAILY 90 tablet 3     No current facility-administered medications for this visit.      Allergies:     No Known Allergies   Physical Exam:     /78 (BP Location: Left arm, Patient Position: Sitting, Cuff Size: Standard)   Pulse 89   Temp 98.5 °F (36.9 °C) (Tympanic)   Resp 18   Ht 5' 9\" (1.753 m)   Wt 88.3 kg (194 lb 9.6 oz)   SpO2 98%   BMI 28.74 kg/m²     Physical Exam  Vitals and nursing note reviewed.   Constitutional:       General: He is not in acute distress.   "   Appearance: He is well-developed.   HENT:      Head: Normocephalic and atraumatic.      Nose: Nose normal.      Mouth/Throat:      Mouth: Mucous membranes are moist.      Pharynx: Oropharynx is clear.   Eyes:      Conjunctiva/sclera: Conjunctivae normal.   Cardiovascular:      Rate and Rhythm: Normal rate and regular rhythm.      Heart sounds: No murmur heard.  Pulmonary:      Effort: Pulmonary effort is normal. No respiratory distress.      Breath sounds: Normal breath sounds.   Abdominal:      General: Bowel sounds are normal.      Palpations: Abdomen is soft.      Tenderness: There is no abdominal tenderness.   Genitourinary:     Comments: Exam deferred  Musculoskeletal:         General: No swelling.      Lumbar back: No tenderness or bony tenderness. Normal range of motion.   Skin:     General: Skin is warm and dry.      Capillary Refill: Capillary refill takes less than 2 seconds.   Neurological:      Mental Status: He is alert and oriented to person, place, and time.   Psychiatric:         Mood and Affect: Mood normal.          VU Wei  [facility]

## 2024-03-14 DIAGNOSIS — E55.9 VITAMIN D DEFICIENCY: ICD-10-CM

## 2024-03-14 RX ORDER — MELATONIN
2000 DAILY
Qty: 180 TABLET | Refills: 3 | Status: SHIPPED | OUTPATIENT
Start: 2024-03-14

## 2024-04-12 ENCOUNTER — OFFICE VISIT (OUTPATIENT)
Dept: URGENT CARE | Facility: MEDICAL CENTER | Age: 64
End: 2024-04-12
Payer: COMMERCIAL

## 2024-04-12 VITALS
OXYGEN SATURATION: 98 % | RESPIRATION RATE: 18 BRPM | HEIGHT: 69 IN | HEART RATE: 67 BPM | BODY MASS INDEX: 28.44 KG/M2 | TEMPERATURE: 98.2 F | SYSTOLIC BLOOD PRESSURE: 110 MMHG | DIASTOLIC BLOOD PRESSURE: 60 MMHG | WEIGHT: 192 LBS

## 2024-04-12 DIAGNOSIS — L91.8 SKIN TAG: Primary | ICD-10-CM

## 2024-04-12 PROCEDURE — 99212 OFFICE O/P EST SF 10 MIN: CPT

## 2024-04-12 NOTE — PROGRESS NOTES
[facility]        NAME: Thomas M McArdle Jr. is a 64 y.o. male  : 1960    MRN: [id number]  DATE: 2024  TIME: 11:35 AM    Assessment and Plan   Skin tag [L91.8]  1. Skin tag              Patient Instructions       Follow up with PCP in 3-5 days.  Proceed to  ER if symptoms worsen.    If tests are performed, our office will contact you with results only if changes need to made to the care plan discussed with you at the visit. [facility]    Chief Complaint     Chief Complaint   Patient presents with   • Insect Bite     Tick bite to left shoulder . Tick possibly still in the shoulder          History of Present Illness       Monday/Tuesday noticed something on his left shoulder. [place] He tried to removed it on Wednesday but not sure if he got it all. He initially thought it was a skin tag and then felt maybe it was a tick and tried to remove it. He did apply antibiotic ointment.         Review of Systems   Review of Systems   Constitutional:  Negative for appetite change, chills, fatigue and fever.   Respiratory:  Negative for cough, chest tightness and shortness of breath.    Gastrointestinal:  Negative for abdominal pain, constipation, diarrhea, nausea and vomiting.   Musculoskeletal:  Negative for arthralgias, gait problem and joint swelling.   Skin:  Positive for wound. Negative for color change and rash.   Neurological:  Negative for dizziness, light-headedness and headaches.         Current Medications       Current Outpatient Medications:   •  chlorthalidone 25 mg tablet, Take 1 tablet (25 mg total) by mouth daily (Patient not taking: Reported on 2024), Disp: 90 tablet, Rfl: 1  •  cholecalciferol (VITAMIN D3) 1,000 units tablet, TAKE 2 TABLETS DAILY (Patient not taking: Reported on 2024), Disp: 180 tablet, Rfl: 3  •  rosuvastatin (CRESTOR) 10 MG tablet, TAKE 1 TABLET DAILY (Patient not taking: Reported  "on 4/12/2024), Disp: 90 tablet, Rfl: 3    Current Allergies     Allergies as of 04/12/2024   • (No Known Allergies)            The following portions of the patient's history were reviewed and updated as appropriate: allergies, current medications, past family history, past medical history, past social history, past surgical history and problem list.     Past Medical History:   Diagnosis Date   • Known health problems: none        Past Surgical History:   Procedure Laterality Date   • LUMBAR FUSION N/A 3/16/2023    Procedure: FUSION LUMBAR/THORACIC POSTERIOR PERCUTANEOUS SCREW FIXATION T11 -L3(NAVIGATED);  Surgeon: Craig Robert Goldberg, MD;  Location: [facility];  Service: Neurosurgery   • ROTATOR CUFF REPAIR Right    • SHOULDER SURGERY         Family History   Problem Relation Age of Onset   • Breast cancer Mother    • Heart disease Father    • No Known Problems Sister    • No Known Problems Brother          Medications have been verified.        Objective   /60   Pulse 67   Temp 98.2 °F (36.8 °C)   Resp 18   Ht 5' 9\" (1.753 m)   Wt 87.1 kg (192 lb)   SpO2 98%   BMI 28.35 kg/m²        Physical Exam     Physical Exam  Vitals and nursing note reviewed.   Constitutional:       General: He is not in acute distress.     Appearance: Normal appearance. He is normal weight. He is not ill-appearing.   HENT:      Head: Normocephalic and atraumatic.      Nose: Nose normal.      Mouth/Throat:      Lips: Pink.      Mouth: Mucous membranes are moist.      Pharynx: Oropharynx is clear.   Eyes:      Extraocular Movements: Extraocular movements intact.      Conjunctiva/sclera: Conjunctivae normal.      Pupils: Pupils are equal, round, and reactive to light.   Cardiovascular:      Rate and Rhythm: Normal rate and regular rhythm.      Pulses: Normal pulses.      Heart sounds: Normal heart sounds.   Pulmonary:      Effort: Pulmonary effort is normal.      Breath sounds: Normal breath sounds.   Abdominal:      General: " Abdomen is flat. Bowel sounds are normal.      Palpations: Abdomen is soft.   Musculoskeletal:         General: Normal range of motion.      Cervical back: Full passive range of motion without pain, normal range of motion and neck supple.   Skin:     General: Skin is warm.      Capillary Refill: Capillary refill takes less than 2 seconds.      Findings: No rash.          Neurological:      General: No focal deficit present.      Mental Status: He is alert and oriented to person, place, and time.   Psychiatric:         Mood and Affect: Mood normal.         Behavior: Behavior normal.

## 2024-09-19 ENCOUNTER — RA CDI HCC (OUTPATIENT)
Dept: OTHER | Facility: HOSPITAL | Age: 64
End: 2024-09-19

## 2024-09-26 ENCOUNTER — OFFICE VISIT (OUTPATIENT)
Dept: FAMILY MEDICINE CLINIC | Facility: CLINIC | Age: 64
End: 2024-09-26
Payer: COMMERCIAL

## 2024-09-26 ENCOUNTER — APPOINTMENT (OUTPATIENT)
Dept: LAB | Facility: MEDICAL CENTER | Age: 64
End: 2024-09-26
Payer: COMMERCIAL

## 2024-09-26 VITALS
BODY MASS INDEX: 28.14 KG/M2 | OXYGEN SATURATION: 96 % | TEMPERATURE: 97.5 F | WEIGHT: 190 LBS | DIASTOLIC BLOOD PRESSURE: 86 MMHG | SYSTOLIC BLOOD PRESSURE: 142 MMHG | HEIGHT: 69 IN | HEART RATE: 82 BPM | RESPIRATION RATE: 18 BRPM

## 2024-09-26 DIAGNOSIS — Z13.29 THYROID DISORDER SCREEN: ICD-10-CM

## 2024-09-26 DIAGNOSIS — E78.5 HYPERLIPIDEMIA, UNSPECIFIED HYPERLIPIDEMIA TYPE: ICD-10-CM

## 2024-09-26 DIAGNOSIS — Z13.1 DIABETES MELLITUS SCREENING: ICD-10-CM

## 2024-09-26 DIAGNOSIS — E55.9 VITAMIN D DEFICIENCY: ICD-10-CM

## 2024-09-26 DIAGNOSIS — I10 ESSENTIAL HYPERTENSION: ICD-10-CM

## 2024-09-26 DIAGNOSIS — Z12.5 PROSTATE CANCER SCREENING: ICD-10-CM

## 2024-09-26 LAB
25(OH)D3 SERPL-MCNC: 26.1 NG/ML (ref 30–100)
ALBUMIN SERPL BCG-MCNC: 4.6 G/DL (ref 3.5–5)
ALP SERPL-CCNC: 83 U/L (ref 34–104)
ALT SERPL W P-5'-P-CCNC: 24 U/L (ref 7–52)
ANION GAP SERPL CALCULATED.3IONS-SCNC: 9 MMOL/L (ref 4–13)
AST SERPL W P-5'-P-CCNC: 25 U/L (ref 13–39)
BASOPHILS # BLD AUTO: 0.03 THOUSANDS/ΜL (ref 0–0.1)
BASOPHILS NFR BLD AUTO: 1 % (ref 0–1)
BILIRUB SERPL-MCNC: 0.34 MG/DL (ref 0.2–1)
BUN SERPL-MCNC: 20 MG/DL (ref 5–25)
CALCIUM SERPL-MCNC: 8.8 MG/DL (ref 8.4–10.2)
CHLORIDE SERPL-SCNC: 101 MMOL/L (ref 96–108)
CHOLEST SERPL-MCNC: 248 MG/DL
CO2 SERPL-SCNC: 28 MMOL/L (ref 21–32)
CREAT SERPL-MCNC: 0.8 MG/DL (ref 0.6–1.3)
EOSINOPHIL # BLD AUTO: 0.07 THOUSAND/ΜL (ref 0–0.61)
EOSINOPHIL NFR BLD AUTO: 2 % (ref 0–6)
ERYTHROCYTE [DISTWIDTH] IN BLOOD BY AUTOMATED COUNT: 12.4 % (ref 11.6–15.1)
EST. AVERAGE GLUCOSE BLD GHB EST-MCNC: 117 MG/DL
GFR SERPL CREATININE-BSD FRML MDRD: 94 ML/MIN/1.73SQ M
GLUCOSE P FAST SERPL-MCNC: 107 MG/DL (ref 65–99)
HBA1C MFR BLD: 5.7 %
HCT VFR BLD AUTO: 45.6 % (ref 36.5–49.3)
HDLC SERPL-MCNC: 37 MG/DL
HGB BLD-MCNC: 16.2 G/DL (ref 12–17)
IMM GRANULOCYTES # BLD AUTO: 0.02 THOUSAND/UL (ref 0–0.2)
IMM GRANULOCYTES NFR BLD AUTO: 0 % (ref 0–2)
LDLC SERPL DIRECT ASSAY-MCNC: 116 MG/DL (ref 0–100)
LYMPHOCYTES # BLD AUTO: 0.86 THOUSANDS/ΜL (ref 0.6–4.47)
LYMPHOCYTES NFR BLD AUTO: 19 % (ref 14–44)
MCH RBC QN AUTO: 33.5 PG (ref 26.8–34.3)
MCHC RBC AUTO-ENTMCNC: 35.5 G/DL (ref 31.4–37.4)
MCV RBC AUTO: 94 FL (ref 82–98)
MONOCYTES # BLD AUTO: 0.42 THOUSAND/ΜL (ref 0.17–1.22)
MONOCYTES NFR BLD AUTO: 9 % (ref 4–12)
NEUTROPHILS # BLD AUTO: 3.2 THOUSANDS/ΜL (ref 1.85–7.62)
NEUTS SEG NFR BLD AUTO: 69 % (ref 43–75)
NRBC BLD AUTO-RTO: 0 /100 WBCS
PLATELET # BLD AUTO: 157 THOUSANDS/UL (ref 149–390)
PMV BLD AUTO: 12.5 FL (ref 8.9–12.7)
POTASSIUM SERPL-SCNC: 4 MMOL/L (ref 3.5–5.3)
PROT SERPL-MCNC: 6.9 G/DL (ref 6.4–8.4)
PSA SERPL-MCNC: 2.99 NG/ML (ref 0–4)
RBC # BLD AUTO: 4.83 MILLION/UL (ref 3.88–5.62)
SODIUM SERPL-SCNC: 138 MMOL/L (ref 135–147)
TRIGL SERPL-MCNC: 981 MG/DL
TSH SERPL DL<=0.05 MIU/L-ACNC: 3.03 UIU/ML (ref 0.45–4.5)
WBC # BLD AUTO: 4.6 THOUSAND/UL (ref 4.31–10.16)

## 2024-09-26 PROCEDURE — 83036 HEMOGLOBIN GLYCOSYLATED A1C: CPT

## 2024-09-26 PROCEDURE — 82306 VITAMIN D 25 HYDROXY: CPT

## 2024-09-26 PROCEDURE — 99213 OFFICE O/P EST LOW 20 MIN: CPT | Performed by: NURSE PRACTITIONER

## 2024-09-26 PROCEDURE — 85025 COMPLETE CBC W/AUTO DIFF WBC: CPT

## 2024-09-26 PROCEDURE — 84443 ASSAY THYROID STIM HORMONE: CPT

## 2024-09-26 PROCEDURE — 83721 ASSAY OF BLOOD LIPOPROTEIN: CPT

## 2024-09-26 PROCEDURE — 80053 COMPREHEN METABOLIC PANEL: CPT

## 2024-09-26 PROCEDURE — G0103 PSA SCREENING: HCPCS

## 2024-09-26 PROCEDURE — 80061 LIPID PANEL: CPT

## 2024-09-26 PROCEDURE — 36415 COLL VENOUS BLD VENIPUNCTURE: CPT

## 2024-09-26 RX ORDER — ROSUVASTATIN CALCIUM 10 MG/1
10 TABLET, COATED ORAL DAILY
Qty: 100 TABLET | Refills: 3 | Status: SHIPPED | OUTPATIENT
Start: 2024-09-26

## 2024-09-26 RX ORDER — CHOLECALCIFEROL (VITAMIN D3) 25 MCG
2000 TABLET ORAL DAILY
Qty: 200 TABLET | Refills: 3 | Status: SHIPPED | OUTPATIENT
Start: 2024-09-26

## 2024-09-26 RX ORDER — CHLORTHALIDONE 25 MG/1
25 TABLET ORAL DAILY
Qty: 100 TABLET | Refills: 3 | Status: SHIPPED | OUTPATIENT
Start: 2024-09-26

## 2024-09-26 NOTE — ASSESSMENT & PLAN NOTE
Continue 2000 IU Vitamin D supplement daily  Refills provided today  Orders:    cholecalciferol (VITAMIN D3) 1,000 units tablet; Take 2 tablets (2,000 Units total) by mouth daily

## 2024-09-26 NOTE — PATIENT INSTRUCTIONS
"Patient Education     High blood pressure in adults   The Basics   Written by the doctors and editors at [facility]   What is high blood pressure? -- High blood pressure is a condition that puts you at risk for heart attack, stroke, and kidney disease. It does not usually cause symptoms. But it can be serious.  When your doctor or nurse tells you your blood pressure, they say 2 numbers. For instance, your doctor or nurse might say that your blood pressure is \"130 over 80.\" The top number is the pressure inside your arteries when your heart is cristina. The bottom number is the pressure inside your arteries when your heart is relaxed.  \"Elevated blood pressure\" is a term doctors or nurses use as a warning. People with elevated blood pressure do not yet have high blood pressure. But their blood pressure is not as low as it should be for good health.  Many experts define high, elevated, and normal blood pressure as follows:   High - Top number of 130 or above and/or bottom number of 80 or above.   Elevated - Top number between 120 and 129 and bottom number of 79 or below.   Normal - Top number of 119 or below and bottom number of 79 or below.  This information is also in the table (table 1).  How can I lower my blood pressure? -- If your doctor or nurse prescribed blood pressure medicine, the most important thing you can do is to take it. If it causes side effects, do not just stop taking it. Instead, talk to your doctor or nurse about the problems it causes. They might be able to lower your dose or switch you to another medicine. If cost is a problem, mention that, too. They might be able to put you on a less expensive medicine. Taking your blood pressure medicine can keep you from having a heart attack or stroke, and it can save your life!  Can I do anything on my own? -- You have a lot of control over your blood pressure. To lower it:   Lose weight (if you are overweight).   Choose a diet low in fat and rich in " "fruits, vegetables, and low-fat dairy products.   Eat less salt.   Do something active for at least 30 minutes a day on most days of the week.   Drink less alcohol (if you drink more than 2 alcoholic drinks per day).  It's also a good idea to get a home blood pressure meter. People who check their own blood pressure at home do better at keeping it low and can sometimes even reduce the amount of medicine they take.  All topics are updated as new evidence becomes available and our peer review process is complete.  This topic retrieved from [employer] on: Feb 26, 2024.  Topic 54021 Version 23.0  Release: 32.2.4 - C32.56  © 2024 UpToDate, Inc. and/or its affiliates. All rights reserved.  table 1: Definition of normal and high blood pressure  Level  Top number  Bottom number    High 130 or above 80 or above   Elevated 120 to 129 79 or below   Normal 119 or below 79 or below   These definitions are from the American College of Cardiology/American Heart Association. Other expert groups might use slightly different definitions.  \"Elevated blood pressure\" is a term doctor or nurses use as a warning. It means you do not yet have high blood pressure, but your blood pressure is not as low as it should be for good health.  Graphic 37787 Version 6.0  Consumer Information Use and Disclaimer   Disclaimer: This generalized information is a limited summary of diagnosis, treatment, and/or medication information. It is not meant to be comprehensive and should be used as a tool to help the user understand and/or assess potential diagnostic and treatment options. It does NOT include all information about conditions, treatments, medications, side effects, or risks that may apply to a specific patient. It is not intended to be medical advice or a substitute for the medical advice, diagnosis, or treatment of a health care provider based on the health care provider's examination and assessment of a patient's specific and unique circumstances. " Patients must speak with a health care provider for complete information about their health, medical questions, and treatment options, including any risks or benefits regarding use of medications. This information does not endorse any treatments or medications as safe, effective, or approved for treating a specific patient. Up[employer]Date, Inc. and its affiliates disclaim any warranty or liability relating to this information or the use thereof.The use of this information is governed by the Terms of Use, available at https://www.[employer].com/en/know/clinical-effectiveness-terms. 2024© UpToDate, Inc. and its affiliates and/or licensors. All rights reserved.  Copyright   © 2024 Up[employer]Date, Inc. and/or its affiliates. All rights reserved.

## 2024-09-26 NOTE — ASSESSMENT & PLAN NOTE
Discussed noncompliance and importance of consistent medication use  Continue low Na diet and exercise  Chlorthalidone refilled today as patient denies any adverse effects r/t same  Orders:    chlorthalidone 25 mg tablet; Take 1 tablet (25 mg total) by mouth daily

## 2024-09-27 ENCOUNTER — TELEPHONE (OUTPATIENT)
Dept: FAMILY MEDICINE CLINIC | Facility: CLINIC | Age: 64
End: 2024-09-27

## 2024-09-27 NOTE — TELEPHONE ENCOUNTER
Left message to call back office regarding results. ----- Message from VU Araujo sent at 9/27/2024 12:27 PM EDT -----  Patient was not taking his medications previously. Please instruct him to have repeat labs completed in 3 months for cholesterol and vitamin d. Additionally, his labs show prediabetes. Recommend improving diet and exercise for management of same such as reducing carbohydrates and increasing protein/vegetables in his diet. Please, let me know if he has any questions. Thanks!

## 2024-10-03 ENCOUNTER — TELEPHONE (OUTPATIENT)
Dept: FAMILY MEDICINE CLINIC | Facility: CLINIC | Age: 64
End: 2024-10-03

## 2024-10-03 DIAGNOSIS — E55.9 VITAMIN D DEFICIENCY: Primary | ICD-10-CM

## 2024-10-03 DIAGNOSIS — E78.5 HYPERLIPIDEMIA, UNSPECIFIED HYPERLIPIDEMIA TYPE: ICD-10-CM

## 2024-10-03 NOTE — TELEPHONE ENCOUNTER
Patient made aware of results and verbalized understanding. Please place orders for 3 months. Patient made aware of appt on 10/24 to recheck HTN.  ----- Message from VU Araujo sent at 9/27/2024 12:27 PM EDT -----  Patient was not taking his medications previously. Please instruct him to have repeat labs completed in 3 months for cholesterol and vitamin d. Additionally, his labs show prediabetes. Recommend improving diet and exercise for management of same such as reducing carbohydrates and increasing protein/vegetables in his diet. Please, let me know if he has any questions. Thanks!

## 2024-11-13 ENCOUNTER — VBI (OUTPATIENT)
Dept: ADMINISTRATIVE | Facility: OTHER | Age: 64
End: 2024-11-13

## 2024-11-13 NOTE — TELEPHONE ENCOUNTER
11/13/24 9:05 AM     Chart reviewed for   Controlling High Blood Pressure    ; nothing is submitted to the patient's insurance at this time.     KATHLEEN NICHOLAS MA   [facility]

## 2025-06-19 ENCOUNTER — TELEPHONE (OUTPATIENT)
Dept: FAMILY MEDICINE CLINIC | Facility: CLINIC | Age: 65
End: 2025-06-19

## 2025-06-19 NOTE — TELEPHONE ENCOUNTER
Spoke to pt about scheduling annual physical-pt said that he will schedule an apt, but not at this time.

## (undated) DEVICE — ADHESIVE SKIN HIGH VISCOSITY EXOFIN 1ML

## (undated) DEVICE — TRAY FOLEY 16FR URIMETER SURESTEP

## (undated) DEVICE — DRAPE LAPAROTOMY W/POUCHES

## (undated) DEVICE — PIN, 9733235, 100MM, STERILE, PERC REF

## (undated) DEVICE — GLOVE INDICATOR PI UNDERGLOVE SZ 8.5 BLUE

## (undated) DEVICE — JACKSON TABLE FOAM POSITIONING KIT: Brand: CARDINAL HEALTH

## (undated) DEVICE — SPONGE PVP SCRUB WING STERILE

## (undated) DEVICE — GLOVE SRG BIOGEL ORTHOPEDIC 8

## (undated) DEVICE — MONITORING SPINAL IMPULSE CASE FEE

## (undated) DEVICE — DRAPE SURGIKIT SADDLE BAG

## (undated) DEVICE — ANTIBACTERIAL VIOLET BRAIDED (POLYGLACTIN 910), SYNTHETIC ABSORBABLE SUTURE: Brand: COATED VICRYL

## (undated) DEVICE — INTENDED FOR TISSUE SEPARATION, AND OTHER PROCEDURES THAT REQUIRE A SHARP SURGICAL BLADE TO PUNCTURE OR CUT.: Brand: BARD-PARKER SAFETY BLADES SIZE 15, STERILE

## (undated) DEVICE — DRESSING MEPILEX BORDER 4 X 8 IN

## (undated) DEVICE — DRESSING MEPILEX AG BORDER 4 X 4 IN

## (undated) DEVICE — BETHLEHEM UNIVERSAL SPINE, KIT: Brand: CARDINAL HEALTH

## (undated) DEVICE — SURGIFOAM 8.5 X 12.5

## (undated) DEVICE — DRAPE SHEET THREE QUARTER

## (undated) DEVICE — TOOL MR8-14MH30 MR8 14CM MATCH 3MM: Brand: MIDAS REX MR8

## (undated) DEVICE — MARKER REFLECTIVE RADIOPAQUE SPHERE

## (undated) DEVICE — SUPPLY FEE STD

## (undated) DEVICE — CHLORAPREP HI-LITE 26ML ORANGE

## (undated) DEVICE — PENCIL ELECTROSURG E-Z CLEAN -0035H